# Patient Record
Sex: FEMALE | Race: BLACK OR AFRICAN AMERICAN | Employment: OTHER | URBAN - METROPOLITAN AREA
[De-identification: names, ages, dates, MRNs, and addresses within clinical notes are randomized per-mention and may not be internally consistent; named-entity substitution may affect disease eponyms.]

---

## 2017-06-22 ENCOUNTER — OFFICE VISIT (OUTPATIENT)
Dept: ENDOCRINOLOGY | Age: 72
End: 2017-06-22

## 2017-06-22 VITALS
HEART RATE: 78 BPM | OXYGEN SATURATION: 95 % | HEIGHT: 62 IN | RESPIRATION RATE: 18 BRPM | SYSTOLIC BLOOD PRESSURE: 112 MMHG | DIASTOLIC BLOOD PRESSURE: 91 MMHG | TEMPERATURE: 97.8 F | WEIGHT: 184 LBS | BODY MASS INDEX: 33.86 KG/M2

## 2017-06-22 DIAGNOSIS — I10 ESSENTIAL HYPERTENSION: ICD-10-CM

## 2017-06-22 DIAGNOSIS — E05.90 HYPERTHYROIDISM: Primary | ICD-10-CM

## 2017-06-22 RX ORDER — INDOMETHACIN 50 MG/1
CAPSULE ORAL
COMMUNITY
Start: 2017-04-11 | End: 2019-08-14

## 2017-06-22 RX ORDER — COLCHICINE 0.6 MG/1
TABLET, FILM COATED ORAL
COMMUNITY
Start: 2017-04-11 | End: 2021-01-10

## 2017-06-22 RX ORDER — METHIMAZOLE 5 MG/1
TABLET ORAL
Qty: 60 TAB | Refills: 5 | Status: SHIPPED | OUTPATIENT
Start: 2017-06-22 | End: 2018-02-07 | Stop reason: SDUPTHER

## 2017-06-22 RX ORDER — DUREZOL 0.5 MG/ML
EMULSION OPHTHALMIC
COMMUNITY
Start: 2017-05-31 | End: 2019-08-14

## 2017-06-22 RX ORDER — ALLOPURINOL 100 MG/1
200 TABLET ORAL DAILY
COMMUNITY
Start: 2017-04-13 | End: 2020-08-22

## 2017-06-22 RX ORDER — KETOROLAC TROMETHAMINE 5 MG/ML
SOLUTION OPHTHALMIC
COMMUNITY
Start: 2017-05-25 | End: 2019-08-14

## 2017-06-22 RX ORDER — FLUOXETINE HYDROCHLORIDE 20 MG/1
20 CAPSULE ORAL DAILY
COMMUNITY
Start: 2017-04-15 | End: 2020-08-22

## 2017-06-22 NOTE — PROGRESS NOTES
Visit Vitals    BP (!) 112/91    Pulse 78    Temp 97.8 °F (36.6 °C) (Oral)    Resp 18    Ht 5' 2\" (1.575 m)    Wt 184 lb (83.5 kg)    SpO2 95%    BMI 33.65 kg/m2     Chief Complaint   Patient presents with    Thyroid Problem

## 2017-06-22 NOTE — MR AVS SNAPSHOT
Visit Information Date & Time Provider Department Dept. Phone Encounter #  
 6/22/2017 11:00 AM Lui Carroll MD [facility] Endocr[facility]ology [phone] [id number] Follow-up Instructions Return in about 3 months (around 9/22/2017). Upcoming Health Maintenance Date Due Hepatitis C Screening 1945 DTaP/Tdap/Td series (1 - Tdap) 4/29/1966 BREAST CANCER SCRN MAMMOGRAM 4/29/1995 FOBT Q 1 YEAR AGE 50-75 4/29/1995 ZOSTER VACCINE AGE 60> 4/29/2005 GLAUCOMA SCREENING Q2Y 4/29/2010 OSTEOPOROSIS SCREENING (DEXA) 4/29/2010 Pneumococcal 65+ Low/Medium Risk (1 of 2 - PCV13) 4/29/2010 MEDICARE YEARLY EXAM 4/29/2010 INFLUENZA AGE 9 TO ADULT 8/1/2017 Allergies as of 6/22/2017  Review Complete On: 12/9/2014 By: Joyce Landers LPN Severity Noted Reaction Type Reactions Iodinated Contrast- Oral And Iv Dye High 06/22/2017    Anaphylaxis Codeine  06/22/2017    Itching Current Immunizations  Never Reviewed No immunizations on file. Not reviewed this visit You Were Diagnosed With   
  
 Codes Comments Hyperthyroidism    -  Primary ICD-10-CM: E05.90 ICD-9-CM: 242.90 Vitals BP Pulse Temp Resp Height(growth percentile) Weight(growth percentile) (!) 112/91 78 97.8 °F (36.6 °C) (Oral) 18 5' 2\" (1.575 m) 184 lb (83.5 kg) SpO2 BMI Smoking Status 95% 33.65 kg/m2 Current Every Day Smoker Vitals History BMI and BSA Data Body Mass Index Body Surface Area  
 33.65 kg/m 2 1.91 m 2 Your Updated Medication List  
  
   
This list is accurate as of: 6/22/17 12:20 PM.  Always use your most recent med list.  
  
  
  
  
 allopurinol 100 mg tablet Commonly known as:  Rai Casey ALPRAZolam 0.5 mg tablet Commonly known as:  Edwin Isela Take  by mouth. atorvastatin 40 mg tablet Commonly known as:  LIPITOR Take  by mouth daily. COLCRYS 0.6 mg tablet Generic drug:  colchicine DIOVAN 40 mg tablet Generic drug:  valsartan Take  by mouth daily. DUREZOL 0.05 % ophthalmic emulsion Generic drug:  Difluprednate FLUoxetine 20 mg capsule Commonly known as:  PROzac  
  
 indomethacin 50 mg capsule Commonly known as:  INDOCIN  
  
 ketorolac 0.5 % ophthalmic solution Commonly known as:  ACULAR  
  
 letrozole 2.5 mg tablet Commonly known as:  [facility] Take 2.5 mg by mouth daily. NexIUM 40 mg capsule Generic drug:  esomeprazole Take  by mouth daily. PARoxetine 40 mg tablet Commonly known as:  PAXIL Take 40 mg by mouth daily. spironolactone 25 mg tablet Commonly known as:  ALDACTONE Take  by mouth daily. traMADol 50 mg tablet Commonly known as:  ULTRAM  
Take 50 mg by mouth every six (6) hours as needed for Pain. VITAMIN D3 1,000 unit Cap Generic drug:  cholecalciferol Take  by mouth. Follow-up Instructions Return in about 3 months (around 9/22/2017). Introducing [facility] & HEALTH SERVICES! [employer] introduces [employer] patient portal. Now you can access parts of your medical record, email your doctor's office, and request medication refills online. 1. In your internet browser, go to [email or web address] 2. Click on the First Time User? Click Here link in the Sign In box. You will see the New Member Sign Up page. 3. Enter your [employer] Access Code exactly as it appears below. You will not need to use this code after youve completed the sign-up process. If you do not sign up before the expiration date, you must request a new code. · [employer] Access Code: [email or web address] Expires: 9/20/2017 12:19 PM 
 
4. Enter the last four digits of your Social Security Number (xxxx) and Date of Birth (mm/dd/yyyy) as indicated and click Submit. You will be taken to the next sign-up page. 5. Create a [employer] ID.  This will be your [employer] login ID and cannot be changed, so think of one that is secure and easy to remember. 6. Create a [employer] password. You can change your password at any time. 7. Enter your Password Reset Question and Answer. This can be used at a later time if you forget your password. 8. Enter your e-mail address. You will receive e-mail notification when new information is available in [address] 9. Click Sign Up. You can now view and download portions of your medical record. 10. Click the Download Summary menu link to download a portable copy of your medical information. If you have questions, please visit the Frequently Asked Questions section of the [employer] website. Remember, [employer] is NOT to be used for urgent needs. For medical emergencies, dial 911. Now available from your iPhone and Android! Please provide this summary of care documentation to your next provider. Your primary care clinician is listed as Elaine Chen. If you have any questions after today's visit, please call [phone].

## 2017-06-23 PROBLEM — I10 ESSENTIAL HYPERTENSION: Status: ACTIVE | Noted: 2017-06-23

## 2017-06-23 NOTE — PROGRESS NOTES
[facility]               Patel Duran MD              [address] [address]           Patient Information  Date:6/23/2017  Name : Shakila Aguirre 67 y.o.     YOB: 1945         Referred by: Dr Julieth Mckinnon   Patient presents with    Thyroid Problem       History of present illness    Shakila Aguirre is a 67 y.o. female  here for evaluation of hyperthyroidism. she was found to have abnormal thyroid function tests. TSH was suppressed with normal free T4 in September 2016 and repeat labs in April 2017 showed the same changes of suppressed TSH. She is with her sister who has hyperthyroidism. She complains of nervousness, shakiness, palpitations, and heat intolerance. She has not been losing weight, no diarrhea. No known history of osteoporosis or atrial fibrillation. She has a history of breast cancer. No change in the size of the neck or neck pain. No dysphagia,dysphonia or dyspnea. No history of known radiation exposure    No FH of thyroid disease. No FH of thyroid cancer     Past Medical History:   Diagnosis Date    Breast cancer ([facility] [address]     Gout     HTN (hypertension)     Hyperlipidemia     Hyperthyroidism        Current Outpatient Prescriptions   Medication Sig    allopurinol (ZYLOPRIM) 100 mg tablet     DUREZOL 0.05 % ophthalmic emulsion     COLCRYS 0.6 mg tablet     FLUoxetine (PROZAC) 20 mg capsule     indomethacin (INDOCIN) 50 mg capsule     ketorolac (ACULAR) 0.5 % ophthalmic solution     methIMAzole (TAPAZOLE) 5 mg tablet Take 2 tabs in the AM for one month, then decrease to 1 tab daily    spironolactone (ALDACTONE) 25 mg tablet Take  by mouth daily.  atorvastatin (LIPITOR) 40 mg tablet Take  by mouth daily.  PARoxetine (PAXIL) 40 mg tablet Take 40 mg by mouth daily.  esomeprazole (NEXIUM) 40 mg capsule Take  by mouth daily.     valsartan (DIOVAN) 40 mg tablet Take  by mouth daily.  letrozole (FEMARA) 2.5 mg tablet Take 2.5 mg by mouth daily.  Cholecalciferol, Vitamin D3, (VITAMIN D3) 1,000 unit cap Take  by mouth.  traMADol (ULTRAM) 50 mg tablet Take 50 mg by mouth every six (6) hours as needed for Pain.  ALPRAZolam (XANAX) 0.5 mg tablet Take  by mouth. No current facility-administered medications for this visit. Review of Systems:  - Constitutional Symptoms: no fevers, chills, no weight loss  - Eyes: no blurry vision or double vision  - Cardiovascular: no chest pain + palpitations  - Respiratory: no cough or shortness of breath  - Gastrointestinal: no dysphagia or abdominal pain  - Musculoskeletal: no joint pains or weakness  - Integumentary: no rashes  - Neurological: no numbness, tingling, or headaches  - Psychiatric: no depression or anxiety  - Endocrine: no heat or cold intolerance, no polyuria or polydipsia    Physical Examination:  Blood pressure (!) 112/91, pulse 78, temperature 97.8 °F (36.6 °C), temperature source Oral, resp. rate 18, height 5' 2\" (1.575 m), weight 184 lb (83.5 kg), SpO2 95 %. Body mass index is 33.65 kg/(m^2). - General: pleasant, no distress, good eye contact  - HEENT: no exopthalmos, no periorbital edema, no scleral/conjunctival injection, EOMI, no lid lag or stare  - Neck: supple, no thyromegaly, nodules, lymph nodes,   - Cardiovascular: regular,  normal S1 and S2, no murmurs  - Respiratory: clear to auscultation bilaterally  - Gastrointestinal: soft, nontender, nondistended, BS +  - Musculoskeletal: no proximal muscle weakness in upper or lower extremities  - Integumentary: + tremors, no edema  - Neurological: alert and oriented   - Psychiatric: normal mood and affect  - Skin - normal turgor    Data Reviewed:           [] Reviewed labs    Assessment/Plan:     1. Hyperthyroidism    2.  Essential hypertension        she has a TSH that is below the lower limit of normal with a normal free T4 , and clinically has some symptoms of hyperthyroidism. In patients older than 72 years  there is increased risk of atrial fibrillation,osteoporosis and hence it is beneficial to treat subclinical hyperthyroidism. Start Methimazole    - repeat TSH, free T4, and total T3 in 2 months. HTN - controlled     Follow-up Disposition:  Return in about 3 months (around 9/22/2017). Thank you for allowing me to participate in the care of this patient.     Mini Pizarro MD      Patient verbalized understanding

## 2017-09-19 LAB
BASOPHILS # BLD AUTO: 0 X10E3/UL (ref 0–0.2)
BASOPHILS NFR BLD AUTO: 0 %
EOSINOPHIL # BLD AUTO: 0.2 X10E3/UL (ref 0–0.4)
EOSINOPHIL NFR BLD AUTO: 2 %
ERYTHROCYTE [DISTWIDTH] IN BLOOD BY AUTOMATED COUNT: 14.8 % (ref 12.3–15.4)
HCT VFR BLD AUTO: 43.6 % (ref 34–46.6)
HGB BLD-MCNC: 14 G/DL (ref 11.1–15.9)
IMM GRANULOCYTES # BLD: 0 X10E3/UL (ref 0–0.1)
IMM GRANULOCYTES NFR BLD: 0 %
LYMPHOCYTES # BLD AUTO: 2.6 X10E3/UL (ref 0.7–3.1)
LYMPHOCYTES NFR BLD AUTO: 31 %
MCH RBC QN AUTO: 29.5 PG (ref 26.6–33)
MCHC RBC AUTO-ENTMCNC: 32.1 G/DL (ref 31.5–35.7)
MCV RBC AUTO: 92 FL (ref 79–97)
MONOCYTES # BLD AUTO: 0.5 X10E3/UL (ref 0.1–0.9)
MONOCYTES NFR BLD AUTO: 6 %
NEUTROPHILS # BLD AUTO: 5.1 X10E3/UL (ref 1.4–7)
NEUTROPHILS NFR BLD AUTO: 61 %
PLATELET # BLD AUTO: 305 X10E3/UL (ref 150–379)
PLEASE NOTE:, 188601: NORMAL
RBC # BLD AUTO: 4.75 X10E6/UL (ref 3.77–5.28)
T4 FREE SERPL-MCNC: 0.51 NG/DL (ref 0.82–1.77)
TSH SERPL DL<=0.005 MIU/L-ACNC: 1.8 UIU/ML (ref 0.45–4.5)
WBC # BLD AUTO: 8.4 X10E3/UL (ref 3.4–10.8)

## 2017-10-03 ENCOUNTER — OFFICE VISIT (OUTPATIENT)
Dept: ENDOCRINOLOGY | Age: 72
End: 2017-10-03

## 2017-10-03 VITALS
TEMPERATURE: 97.3 F | WEIGHT: 185.3 LBS | OXYGEN SATURATION: 96 % | DIASTOLIC BLOOD PRESSURE: 89 MMHG | SYSTOLIC BLOOD PRESSURE: 151 MMHG | HEIGHT: 62 IN | HEART RATE: 75 BPM | RESPIRATION RATE: 16 BRPM | BODY MASS INDEX: 34.1 KG/M2

## 2017-10-03 DIAGNOSIS — E05.90 HYPERTHYROIDISM: Primary | ICD-10-CM

## 2017-10-03 DIAGNOSIS — I10 ESSENTIAL HYPERTENSION: ICD-10-CM

## 2017-10-03 RX ORDER — PREDNISOLONE ACETATE 10 MG/ML
SUSPENSION/ DROPS OPHTHALMIC
COMMUNITY
Start: 2017-09-28 | End: 2019-08-14

## 2017-10-03 RX ORDER — ATROPINE SULFATE 10 MG/ML
SOLUTION/ DROPS OPHTHALMIC
COMMUNITY
Start: 2017-09-28 | End: 2019-08-14

## 2017-10-03 NOTE — PROGRESS NOTES
Lulu Hendricks is a 67 y.o. female here for   Chief Complaint   Patient presents with    Thyroid Problem       1. Have you been to the ER, urgent care clinic since your last visit? Hospitalized since your last visit? -no    2. Have you seen or consulted any other health care providers outside of the [address] [place] [address] since your last visit? Include any pap smears or colon screening. -PCP    Wt Readings from Last 3 Encounters:   06/22/17 184 lb (83.5 kg)   12/09/14 166 lb (75.3 kg)     Temp Readings from Last 3 Encounters:   06/22/17 97.8 °F (36.6 °C) (Oral)   12/09/14 97.5 °F (36.4 °C) (Oral)     BP Readings from Last 3 Encounters:   06/22/17 (!) 112/91   12/09/14 121/70     Pulse Readings from Last 3 Encounters:   06/22/17 78   12/09/14 76

## 2017-10-03 NOTE — PROGRESS NOTES
[facility]               Kale Diaz MD              [address] [address]           Patient Information  Date:10/3/2017  Name : Anel Cooley 67 y.o.     YOB: 1945         Referred by: Dr Duane Jasper        Chief Complaint   Patient presents with    Thyroid Problem       History of present illness    Anel Cooley is a 67 y.o. female  here for fu of hyperthyroidism. she was found to have abnormal thyroid function tests. TSH was suppressed with normal free T4 in September 2016 and repeat labs in April 2017 showed the same changes of suppressed TSH. She has a history of breast cancer. No change in the size of the neck or neck pain. No dysphagia,dysphonia or dyspnea. On methimazole which was started in 6/17  No history of known radiation exposure    No FH of thyroid disease. No FH of thyroid cancer     Wt Readings from Last 3 Encounters:   10/03/17 185 lb 4.8 oz (84.1 kg)   06/22/17 184 lb (83.5 kg)   12/09/14 166 lb (75.3 kg)       Past Medical History:   Diagnosis Date    [facility] ([facility] [address]     Gout     HTN (hypertension)     Hyperlipidemia     Hyperthyroidism        Current Outpatient Prescriptions   Medication Sig    prednisoLONE acetate (PRED FORTE) 1 % ophthalmic suspension     atropine 1 % ophthalmic solution     allopurinol (ZYLOPRIM) 100 mg tablet     COLCRYS 0.6 mg tablet     FLUoxetine (PROZAC) 20 mg capsule     indomethacin (INDOCIN) 50 mg capsule     methIMAzole (TAPAZOLE) 5 mg tablet Take 2 tabs in the AM for one month, then decrease to 1 tab daily    spironolactone (ALDACTONE) 25 mg tablet Take  by mouth daily.  atorvastatin (LIPITOR) 40 mg tablet Take  by mouth daily.  PARoxetine (PAXIL) 40 mg tablet Take 40 mg by mouth daily.  esomeprazole (NEXIUM) 40 mg capsule Take  by mouth daily.  valsartan (DIOVAN) 40 mg tablet Take  by mouth daily.     letrozole (FEMARA) 2.5 mg tablet Take 2.5 mg by mouth daily.  traMADol (ULTRAM) 50 mg tablet Take 50 mg by mouth every six (6) hours as needed for Pain.  ALPRAZolam (XANAX) 0.5 mg tablet Take  by mouth.  DUREZOL 0.05 % ophthalmic emulsion     ketorolac (ACULAR) 0.5 % ophthalmic solution     Cholecalciferol, Vitamin D3, (VITAMIN D3) 1,000 unit cap Take  by mouth. No current facility-administered medications for this visit. Review of Systems:  -   - Respiratory: no cough or shortness of breath  - Gastrointestinal: no dysphagia or abdominal pain  - Musculoskeletal: no joint pains or weakness  - Integumentary: no rashes  - Neurological: no numbness, tingling, or headaches  -     Physical Examination:  Blood pressure 151/89, pulse 75, temperature 97.3 °F (36.3 °C), temperature source Oral, resp. rate 16, height 5' 2\" (1.575 m), weight 185 lb 4.8 oz (84.1 kg), SpO2 96 %. Body mass index is 33.89 kg/(m^2). - General: pleasant, no distress, good eye contact  - HEENT: no exopthalmos, no periorbital edema, no scleral/conjunctival injection, EOMI, no lid lag or stare  - Neck: supple, no thyromegaly, nodules, lymph nodes,   - Cardiovascular: regular,  normal S1 and S2, no murmurs  - Respiratory: clear to auscultation bilaterally  - Gastrointestinal: soft, nontender, nondistended, BS +  - Musculoskeletal: no proximal muscle weakness in upper or lower extremities  - Integumentary: + tremors, no edema  - Neurological: alert and oriented   - Psychiatric: normal mood and affect  - Skin - normal turgor    Data Reviewed:           [] Reviewed labs    Assessment/Plan:     1. Hyperthyroidism    2. Essential hypertension        she has a TSH that is below the lower limit of normal with a normal free T4 , and clinically has some symptoms of hyperthyroidism. In patients older than 72 years  there is increased risk of atrial fibrillation,osteoporosis and hence it is beneficial to treat subclinical hyperthyroidism.    TFTS has improved Methimazole 5 mg M /W/F    - repeat TSH, free T4, and total T3 in 2 months. HTN - optimal for her age     Follow-up Disposition:  Return in about 4 months (around 2/3/2018). Thank you for allowing me to participate in the care of this patient.     Leatha Blair MD      Patient verbalized understanding

## 2017-10-03 NOTE — MR AVS SNAPSHOT
Visit Information Date & Time Provider Department Dept. Phone Encounter #  
 10/3/2017 10:30 AM Petr Mehta MD [facility] [phone] [id number] Follow-up Instructions Return in about 4 months (around 2/3/2018). Upcoming Health Maintenance Date Due Hepatitis C Screening 1945 DTaP/Tdap/Td series (1 - Tdap) 4/29/1966 BREAST CANCER SCRN MAMMOGRAM 4/29/1995 FOBT Q 1 YEAR AGE 50-75 4/29/1995 ZOSTER VACCINE AGE 60> 2/28/2005 GLAUCOMA SCREENING Q2Y 4/29/2010 OSTEOPOROSIS SCREENING (DEXA) 4/29/2010 Pneumococcal 65+ Low/Medium Risk (1 of 2 - PCV13) 4/29/2010 MEDICARE YEARLY EXAM 4/29/2010 INFLUENZA AGE 9 TO ADULT 8/1/2017 Allergies as of 10/3/2017  Review Complete On: 10/3/2017 By: Petr Mehta MD  
  
 Severity Noted Reaction Type Reactions Iodinated Contrast- Oral And Iv Dye High 06/22/2017    Anaphylaxis Codeine  06/22/2017    Itching Current Immunizations  Never Reviewed No immunizations on file. Not reviewed this visit You Were Diagnosed With   
  
 Codes Comments Hyperthyroidism    -  Primary ICD-10-CM: E05.90 ICD-9-CM: 242.90 Vitals BP Pulse Temp Resp Height(growth percentile) Weight(growth percentile) 151/89 (BP 1 Location: Right arm, BP Patient Position: Sitting) 75 97.3 °F (36.3 °C) (Oral) 16 5' 2\" (1.575 m) 185 lb 4.8 oz (84.1 kg) SpO2 BMI Smoking Status 96% 33.89 kg/m2 Current Every Day Smoker Vitals History BMI and BSA Data Body Mass Index Body Surface Area  
 33.89 kg/m 2 1.92 m 2 Preferred Pharmacy Pharmacy Name Phone [facility] [address] [facility], [address] Your Updated Medication List  
  
   
This list is accurate as of: 10/3/17 11:05 AM.  Always use your most recent med list.  
  
  
  
  
 allopurinol 100 mg tablet Commonly known as:  Omaira Cardenas ALPRAZolam 0.5 mg tablet Commonly known as:  Volanda Pronto Take  by mouth. atorvastatin 40 mg tablet Commonly known as:  LIPITOR Take  by mouth daily. atropine 1 % ophthalmic solution COLCRYS 0.6 mg tablet Generic drug:  colchicine DIOVAN 40 mg tablet Generic drug:  valsartan Take  by mouth daily. DUREZOL 0.05 % ophthalmic emulsion Generic drug:  Difluprednate FLUoxetine 20 mg capsule Commonly known as:  PROzac  
  
 indomethacin 50 mg capsule Commonly known as:  INDOCIN  
  
 ketorolac 0.5 % ophthalmic solution Commonly known as:  ACULAR  
  
 letrozole 2.5 mg tablet Commonly known as:  [facility] Take 2.5 mg by mouth daily. methIMAzole 5 mg tablet Commonly known as:  TAPAZOLE Take 2 tabs in the AM for one month, then decrease to 1 tab daily NexIUM 40 mg capsule Generic drug:  esomeprazole Take  by mouth daily. PARoxetine 40 mg tablet Commonly known as:  PAXIL Take 40 mg by mouth daily. prednisoLONE acetate 1 % ophthalmic suspension Commonly known as:  PRED FORTE  
  
 spironolactone 25 mg tablet Commonly known as:  ALDACTONE Take  by mouth daily. traMADol 50 mg tablet Commonly known as:  ULTRAM  
Take 50 mg by mouth every six (6) hours as needed for Pain. VITAMIN D3 1,000 unit Cap Generic drug:  cholecalciferol Take  by mouth. Follow-up Instructions Return in about 4 months (around 2/3/2018). Introducing [facility] & HEALTH SERVICES! [facility] introduces [employer] patient portal. Now you can access parts of your medical record, email your doctor's office, and request medication refills online. 1. In your internet browser, go to [email or web address] 2. Click on the First Time User? Click Here link in the Sign In box. You will see the New Member Sign Up page. 3. Enter your [employer] Access Code exactly as it appears below.  You will not need to use this code after youve completed the sign-up process. If you do not sign up before the expiration date, you must request a new code. · Status Overload Access Code: [email or web address] Expires: 1/1/2018 11:05 AM 
 
4. Enter the last four digits of your Social Security Number (xxxx) and Date of Birth (mm/dd/yyyy) as indicated and click Submit. You will be taken to the next sign-up page. 5. Create a Status Overload ID. This will be your Status Overload login ID and cannot be changed, so think of one that is secure and easy to remember. 6. Create a Status Over[employer] password. You can change your password at any time. 7. Enter your Password Reset Question and Answer. This can be used at a later time if you forget your password. 8. Enter your e-mail address. You will receive e-mail notification when new information is available in [address] 9. Click Sign Up. You can now view and download portions of your medical record. 10. Click the Download Summary menu link to download a portable copy of your medical information. If you have questions, please visit the Frequently Asked Questions section of the Status Over[employer] website. Remember, Status Over[employer] is NOT to be used for urgent needs. For medical emergencies, dial 911. Now available from your iPhone and Android! Please provide this summary of care documentation to your next provider. Your primary care clinician is listed as Shalonda Coleman. If you have any questions after today's visit, please call [phone].

## 2018-01-18 LAB — COLONOSCOPY, EXTERNAL: NORMAL

## 2018-01-26 LAB
CREATININE, EXTERNAL: 1.52
HBA1C MFR BLD HPLC: 6.8 %
LDL-C, EXTERNAL: 97

## 2018-02-07 ENCOUNTER — TELEPHONE (OUTPATIENT)
Dept: ENDOCRINOLOGY | Age: 73
End: 2018-02-07

## 2018-02-07 ENCOUNTER — OFFICE VISIT (OUTPATIENT)
Dept: ENDOCRINOLOGY | Age: 73
End: 2018-02-07

## 2018-02-07 VITALS
OXYGEN SATURATION: 97 % | WEIGHT: 188.2 LBS | RESPIRATION RATE: 18 BRPM | HEART RATE: 68 BPM | SYSTOLIC BLOOD PRESSURE: 146 MMHG | DIASTOLIC BLOOD PRESSURE: 78 MMHG | TEMPERATURE: 97.1 F | HEIGHT: 62 IN | BODY MASS INDEX: 34.63 KG/M2

## 2018-02-07 DIAGNOSIS — I10 ESSENTIAL HYPERTENSION: ICD-10-CM

## 2018-02-07 DIAGNOSIS — E05.90 HYPERTHYROIDISM: Primary | ICD-10-CM

## 2018-02-07 DIAGNOSIS — E05.90 HYPERTHYROIDISM: ICD-10-CM

## 2018-02-07 DIAGNOSIS — G25.2 COARSE TREMORS: ICD-10-CM

## 2018-02-07 RX ORDER — METHIMAZOLE 5 MG/1
TABLET ORAL
Qty: 60 TAB | Refills: 5 | Status: SHIPPED | OUTPATIENT
Start: 2018-02-07 | End: 2018-08-08 | Stop reason: SDUPTHER

## 2018-02-07 NOTE — MR AVS SNAPSHOT
[address] [facility] [address] 
[phone] Patient: Olivia Messina MRN: [id number] :1945 Visit Information Date & Time Provider Department Dept. Phone Encounter #  
 2018 10:45 AM Kirstin Shay MD [facility] [place] Diabetes & Endocrinology [phone] [id number] Follow-up Instructions Return in about 6 months (around 2018). Upcoming Health Maintenance Date Due Hepatitis C Screening 1945 DTaP/Tdap/Td series (1 - Tdap) 1966 BREAST CANCER SCRN MAMMOGRAM 1995 FOBT Q 1 YEAR AGE 50-75 1995 ZOSTER VACCINE AGE 60> 2005 GLAUCOMA SCREENING Q2Y 2010 OSTEOPOROSIS SCREENING (DEXA) 2010 Pneumococcal 65+ Low/Medium Risk (1 of 2 - PCV13) 2010 MEDICARE YEARLY EXAM 2010 Influenza Age 5 to Adult 2017 Allergies as of 2018  Review Complete On: 2018 By: Kirstin Shay MD  
  
 Severity Noted Reaction Type Reactions Iodinated Contrast- Oral And Iv Dye High 2017    Anaphylaxis Codeine  2017    Itching Current Immunizations  Never Reviewed No immunizations on file. Not reviewed this visit You Were Diagnosed With   
  
 Codes Comments Hyperthyroidism    -  Primary ICD-10-CM: E05.90 ICD-9-CM: 242.90 Vitals BP Pulse Temp Resp Height(growth percentile) Weight(growth percentile) 146/78 (BP 1 Location: Left arm, BP Patient Position: Sitting) 68 97.1 °F (36.2 °C) (Oral) 18 5' 2\" (1.575 m) 188 lb 3.2 oz (85.4 kg) SpO2 BMI Smoking Status 97% 34.42 kg/m2 Current Every Day Smoker Vitals History BMI and BSA Data Body Mass Index Body Surface Area 34.42 kg/m 2 1.93 m 2 Preferred Pharmacy Pharmacy Name Phone [facility] [address] [facility], [address] [phone] Your Updated Medication List  
  
   
 This list is accurate as of: 2/7/18 11:28 AM.  Always use your most recent med list.  
  
  
  
  
 allopurinol 100 mg tablet Commonly known as:  Chama Jie ALPRAZolam 0.5 mg tablet Commonly known as:  Ros Windsor Take  by mouth. atorvastatin 40 mg tablet Commonly known as:  LIPITOR Take  by mouth daily. atropine 1 % ophthalmic solution COLCRYS 0.6 mg tablet Generic drug:  colchicine DIOVAN 40 mg tablet Generic drug:  valsartan Take  by mouth daily. DUREZOL 0.05 % ophthalmic emulsion Generic drug:  Difluprednate FLUoxetine 20 mg capsule Commonly known as:  PROzac  
  
 indomethacin 50 mg capsule Commonly known as:  INDOCIN  
  
 ketorolac 0.5 % ophthalmic solution Commonly known as:  ACULAR  
  
 letrozole 2.5 mg tablet Commonly known as:  [facility] Take 2.5 mg by mouth daily. methIMAzole 5 mg tablet Commonly known as:  TAPAZOLE Take 2 tabs in the AM for one month, then decrease to 1 tab daily NexIUM 40 mg capsule Generic drug:  esomeprazole Take  by mouth daily. PARoxetine 40 mg tablet Commonly known as:  PAXIL Take 40 mg by mouth daily. prednisoLONE acetate 1 % ophthalmic suspension Commonly known as:  PRED FORTE  
  
 spironolactone 25 mg tablet Commonly known as:  ALDACTONE Take  by mouth daily. traMADol 50 mg tablet Commonly known as:  ULTRAM  
Take 50 mg by mouth every six (6) hours as needed for Pain. VITAMIN D3 1,000 unit Cap Generic drug:  cholecalciferol Take  by mouth. Follow-up Instructions Return in about 6 months (around 8/7/2018). Introducing [facility] SERVICES! [employer] introduces [employer] patient portal. Now you can access parts of your medical record, email your doctor's office, and request medication refills online. 1. In your internet browser, go to [email or web address] 2. Click on the First Time User? Click Here link in the Sign In box. You will see the New Member Sign Up page. 3. Enter your [employer] Access Code exactly as it appears below. You will not need to use this code after youve completed the sign-up process. If you do not sign up before the expiration date, you must request a new code. · [employer] Access Code: [email or web address] Expires: 5/8/2018 11:27 AM 
 
4. Enter the last four digits of your Social Security Number (xxxx) and Date of Birth (mm/dd/yyyy) as indicated and click Submit. You will be taken to the next sign-up page. 5. Create a [employer] ID. This will be your [employer] login ID and cannot be changed, so think of one that is secure and easy to remember. 6. Create a [employer] password. You can change your password at any time. 7. Enter your Password Reset Question and Answer. This can be used at a later time if you forget your password. 8. Enter your e-mail address. You will receive e-mail notification when new information is available in [address] 9. Click Sign Up. You can now view and download portions of your medical record. 10. Click the Download Summary menu link to download a portable copy of your medical information. If you have questions, please visit the Frequently Asked Questions section of the [employer] website. Remember, [employer] is NOT to be used for urgent needs. For medical emergencies, dial 911. Now available from your iPhone and Android! Please provide this summary of care documentation to your next provider. Your primary care clinician is listed as [facility]. If you have any questions after today's visit, please call [phone].

## 2018-02-07 NOTE — PROGRESS NOTES
[facility]               Julio Manuel MD              [address] [id number]           Patient Information  Date:2/8/2018  Name : Mary Wiley 67 y.o.     YOB: 1945         Referred by: Dr Van Valentin        Chief Complaint   Patient presents with    Thyroid Problem       History of present illness    Mary Wiley is a 67 y.o. female  here for fu of hyperthyroidism. she was found to have abnormal thyroid function tests. TSH was suppressed with normal free T4 in September 2016 and repeat labs in April 2017 showed the same changes of suppressed TSH. She has a history of breast cancer. No change in the size of the neck or neck pain. No dysphagia,dysphonia or dyspnea. On methimazole which was started in 6/17    She is taking the methimazole consistently, 5 mg  3 times a week  No fever, sore throat    No FH of thyroid disease. No FH of thyroid cancer     Wt Readings from Last 3 Encounters:   02/07/18 188 lb 3.2 oz (85.4 kg)   10/03/17 185 lb 4.8 oz (84.1 kg)   06/22/17 184 lb (83.5 kg)       Past Medical History:   Diagnosis Date    Breast cancer (HCC)     Gout     HTN (hypertension)     Hyperlipidemia     Hyperthyroidism        Current Outpatient Prescriptions   Medication Sig    prednisoLONE acetate (PRED FORTE) 1 % ophthalmic suspension     atropine 1 % ophthalmic solution     allopurinol (ZYLOPRIM) 100 mg tablet     DUREZOL 0.05 % ophthalmic emulsion     COLCRYS 0.6 mg tablet     FLUoxetine (PROZAC) 20 mg capsule     indomethacin (INDOCIN) 50 mg capsule     ketorolac (ACULAR) 0.5 % ophthalmic solution     atorvastatin (LIPITOR) 40 mg tablet Take  by mouth daily.  esomeprazole (NEXIUM) 40 mg capsule Take  by mouth daily.  valsartan (DIOVAN) 40 mg tablet Take  by mouth daily.  letrozole (FEMARA) 2.5 mg tablet Take 2.5 mg by mouth daily.     Cholecalciferol, Vitamin D3, (VITAMIN D3) 1,000 unit cap Take  by mouth.  ALPRAZolam (XANAX) 0.5 mg tablet Take  by mouth.  methIMAzole (TAPAZOLE) 5 mg tablet Taking one tablet Monday, Wednesday, and Friday.  spironolactone (ALDACTONE) 25 mg tablet Take  by mouth daily.  PARoxetine (PAXIL) 40 mg tablet Take 40 mg by mouth daily.  traMADol (ULTRAM) 50 mg tablet Take 50 mg by mouth every six (6) hours as needed for Pain. No current facility-administered medications for this visit. Review of Systems:  -   - Respiratory: no cough or shortness of breath  - Gastrointestinal: no dysphagia or abdominal pain  - Musculoskeletal: no joint pains or weakness  - Integumentary: no rashes  - Neurological: no numbness, tingling, or headaches  -     Physical Examination:  Blood pressure 146/78, pulse 68, temperature 97.1 °F (36.2 °C), temperature source Oral, resp. rate 18, height 5' 2\" (1.575 m), weight 188 lb 3.2 oz (85.4 kg), SpO2 97 %. Body mass index is 34.42 kg/(m^2). - General: pleasant, no distress, good eye contact  - HEENT: no exopthalmos, no periorbital edema, no scleral/conjunctival injection, EOMI, no lid lag or stare  - Neck: supple, no thyromegaly, nodules, lymph nodes,   - Cardiovascular: regular,  normal S1 and S2,  - Respiratory: clear to auscultation bilaterally  - Gastrointestinal: soft, nontender, nondistended, BS +  - Musculoskeletal: no proximal muscle weakness in upper or lower extremities  - Integumentary: + tremors, no edema  - Neurological: alert and oriented   - Psychiatric: normal mood and affect  - Skin - normal turgor    Data Reviewed:   Labs from PCPs office which was done in January 2018 reviewed        [] Reviewed labs    Assessment/Plan:     1. Hyperthyroidism    2. Essential hypertension    3. Coarse tremors        she has a TSH that is below the lower limit of normal with a normal free T4 , and clinically has some symptoms of hyperthyroidism.   In patients older than 65 years  there is increased risk of atrial fibrillation,osteoporosis and hence it is beneficial to treat subclinical hyperthyroidism. Methimazole 5 mg M /W/F reviewed the labs from PCPs office. TFTs are normal        HTN - continue present management    Chronic tremors: She is biochemically euthyroid, so not related to her thyroid condition, age-related.    :    Follow-up Disposition:  Return in about 6 months (around 8/7/2018). Thank you for allowing me to participate in the care of this patient.     Michelle Bundy MD      Patient verbalized understanding

## 2018-02-08 PROBLEM — G25.2 COARSE TREMORS: Status: ACTIVE | Noted: 2018-02-08

## 2018-02-08 PROBLEM — E05.00 GRAVES DISEASE: Status: ACTIVE | Noted: 2018-02-08

## 2018-02-08 PROBLEM — E05.00 GRAVES DISEASE: Status: RESOLVED | Noted: 2018-02-08 | Resolved: 2018-02-08

## 2018-05-21 ENCOUNTER — TELEPHONE (OUTPATIENT)
Dept: ENDOCRINOLOGY | Age: 73
End: 2018-05-21

## 2018-08-08 ENCOUNTER — OFFICE VISIT (OUTPATIENT)
Dept: ENDOCRINOLOGY | Age: 73
End: 2018-08-08

## 2018-08-08 VITALS
WEIGHT: 186.3 LBS | RESPIRATION RATE: 16 BRPM | BODY MASS INDEX: 34.28 KG/M2 | HEIGHT: 62 IN | TEMPERATURE: 97.3 F | DIASTOLIC BLOOD PRESSURE: 84 MMHG | HEART RATE: 68 BPM | SYSTOLIC BLOOD PRESSURE: 139 MMHG | OXYGEN SATURATION: 95 %

## 2018-08-08 DIAGNOSIS — E05.90 HYPERTHYROIDISM: ICD-10-CM

## 2018-08-08 DIAGNOSIS — G25.2 COARSE TREMORS: ICD-10-CM

## 2018-08-08 DIAGNOSIS — E05.90 HYPERTHYROIDISM: Primary | ICD-10-CM

## 2018-08-08 DIAGNOSIS — I10 ESSENTIAL HYPERTENSION: ICD-10-CM

## 2018-08-08 RX ORDER — RANITIDINE 300 MG/1
300 TABLET ORAL DAILY
COMMUNITY
End: 2019-08-14

## 2018-08-08 RX ORDER — LOSARTAN POTASSIUM 100 MG/1
100 TABLET ORAL DAILY
COMMUNITY

## 2018-08-08 RX ORDER — ASPIRIN 81 MG/1
81 TABLET ORAL DAILY
COMMUNITY
End: 2022-02-11

## 2018-08-08 RX ORDER — FAMOTIDINE 20 MG/1
20 TABLET, FILM COATED ORAL AS NEEDED
COMMUNITY
End: 2019-08-14

## 2018-08-08 RX ORDER — METHIMAZOLE 5 MG/1
TABLET ORAL
Qty: 90 TAB | Refills: 5 | Status: SHIPPED | OUTPATIENT
Start: 2018-08-08 | End: 2019-10-08 | Stop reason: SDUPTHER

## 2018-08-08 RX ORDER — GLIMEPIRIDE 4 MG/1
4 TABLET ORAL
COMMUNITY
End: 2020-06-26

## 2018-08-08 RX ORDER — NAPROXEN 375 MG/1
375 TABLET ORAL AS NEEDED
COMMUNITY
End: 2019-08-14

## 2018-08-08 NOTE — PROGRESS NOTES
Seble Maurice is a 68 y.o. female here for   Chief Complaint   Patient presents with    Thyroid Problem       1. Have you been to the ER, urgent care clinic since your last visit? Hospitalized since your last visit? -ER in [place] [address] about a month ago because Kidney doc told her to go because potassium was high    2. Have you seen or consulted any other health care providers outside of the [address] [place] [address] since your last visit?   Include any pap smears or colon screening.-Dr. You Estrella and PCP

## 2018-08-08 NOTE — MR AVS SNAPSHOT
[address] [address] 
[phone] Patient: Glory Campbell MRN: [id number] :1945 Visit Information Date & Time Provider Department Dept. Phone Encounter #  
 2018 11:30 AM Arely Albrecht MD [facility] Endocrinology [phone] [id number] Follow-up Instructions Return in about 6 months (around 2019). Upcoming Health Maintenance Date Due Hepatitis C Screening 1945 DTaP/Tdap/Td series (1 - Tdap) 1966 BREAST CANCER SCRN MAMMOGRAM 1995 FOBT Q 1 YEAR AGE 50-75 1995 ZOSTER VACCINE AGE 60> 2005 GLAUCOMA SCREENING Q2Y 2010 Bone Densitometry (Dexa) Screening 2010 Pneumococcal 65+ Low/Medium Risk (1 of 2 - PCV13) 2010 MEDICARE YEARLY EXAM 3/14/2018 Influenza Age 5 to Adult 2018 Allergies as of 2018  Review Complete On: 2018 By: Arely Albrecht MD  
  
 Severity Noted Reaction Type Reactions Iodinated Contrast- Oral And Iv Dye High 2017    Anaphylaxis Codeine  2017    Itching Current Immunizations  Never Reviewed No immunizations on file. Not reviewed this visit You Were Diagnosed With   
  
 Codes Comments Hyperthyroidism    -  Primary ICD-10-CM: E05.90 ICD-9-CM: 242.90 Vitals BP Pulse Temp Resp Height(growth percentile) Weight(growth percentile) 139/84 (BP 1 Location: Left arm, BP Patient Position: Sitting) 68 97.3 °F (36.3 °C) (Oral) 16 5' 2\" (1.575 m) 186 lb 4.8 oz (84.5 kg) SpO2 BMI Smoking Status 95% 34.07 kg/m2 Current Every Day Smoker Vitals History BMI and BSA Data Body Mass Index Body Surface Area 34.07 kg/m 2 1.92 m 2 Preferred Pharmacy Pharmacy Name Phone [address] [facility] [address] [facility], [address] [phone] Your Updated Medication List  
  
   
 This list is accurate as of 8/8/18 11:35 AM.  Always use your most recent med list.  
  
  
  
  
 allopurinol 100 mg tablet Commonly known as:  Calvin Echevarria ALPRAZolam 0.5 mg tablet Commonly known as:  Too January Take  by mouth. aspirin delayed-release 81 mg tablet Take  by mouth daily. atorvastatin 40 mg tablet Commonly known as:  LIPITOR Take  by mouth daily. atropine 1 % ophthalmic solution COLCRYS 0.6 mg tablet Generic drug:  colchicine DIOVAN 40 mg tablet Generic drug:  valsartan Take  by mouth daily. DUREZOL 0.05 % ophthalmic emulsion Generic drug:  Difluprednate  
  
 famotidine 20 mg tablet Commonly known as:  PEPCID Take 20 mg by mouth as needed. FLUoxetine 20 mg capsule Commonly known as:  PROzac  
  
 glimepiride 4 mg tablet Commonly known as:  AMARYL Take 4 mg by mouth every morning. indomethacin 50 mg capsule Commonly known as:  INDOCIN  
  
 ketorolac 0.5 % ophthalmic solution Commonly known as:  ACULAR  
  
 letrozole 2.5 mg tablet Commonly known as:  [facility] Take 2.5 mg by mouth daily. losartan 50 mg tablet Commonly known as:  COZAAR Take  by mouth daily. methIMAzole 5 mg tablet Commonly known as:  TAPAZOLE Taking one tablet Monday, Wednesday, and Friday. NAPROSYN 375 mg tablet Generic drug:  naproxen Take 375 mg by mouth as needed. NexIUM 40 mg capsule Generic drug:  esomeprazole Take  by mouth daily. PARoxetine 40 mg tablet Commonly known as:  PAXIL Take 40 mg by mouth daily. prednisoLONE acetate 1 % ophthalmic suspension Commonly known as:  PRED FORTE  
  
 raNITIdine 300 mg tablet Commonly known as:  ZANTAC Take 300 mg by mouth daily. spironolactone 25 mg tablet Commonly known as:  ALDACTONE Take  by mouth daily. traMADol 50 mg tablet Commonly known as:  ULTRAM  
Take 50 mg by mouth every six (6) hours as needed for Pain. VITAMIN D3 1,000 unit Cap Generic drug:  cholecalciferol Take  by mouth. Follow-up Instructions Return in about 6 months (around 2/8/2019). To-Do List   
 01/08/2019 Lab:  T3 TOTAL   
  
 01/08/2019 Lab:  T4, FREE   
  
 01/08/2019 Lab:  TSH 3RD GENERATION Introducing [facility] & HEALTH SERVICES! Mansi Matamoros introduces [employer] patient portal. Now you can access parts of your medical record, email your doctor's office, and request medication refills online. 1. In your internet browser, go to [email or web address]"/[email or web address] 2. Click on the First Time User? Click Here link in the Sign In box. You will see the New Member Sign Up page. 3. Enter your [employer] Access Code exactly as it appears below. You will not need to use this code after youve completed the sign-up process. If you do not sign up before the expiration date, you must request a new code. · [employer] Access Code: [email or web address] Expires: 11/6/2018 11:35 AM 
 
4. Enter the last four digits of your Social Security Number (xxxx) and Date of Birth (mm/dd/yyyy) as indicated and click Submit. You will be taken to the next sign-up page. 5. Create a [employer] ID. This will be your [employer] login ID and cannot be changed, so think of one that is secure and easy to remember. 6. Create a [employer] password. You can change your password at any time. 7. Enter your Password Reset Question and Answer. This can be used at a later time if you forget your password. 8. Enter your e-mail address. You will receive e-mail notification when new information is available in [address] 9. Click Sign Up. You can now view and download portions of your medical record. 10. Click the Download Summary menu link to download a portable copy of your medical information. If you have questions, please visit the Frequently Asked Questions section of the [employer] website.  Remember, [employer] is NOT to be used for urgent needs. For medical emergencies, dial 911. Now available from your iPhone and Android! Please provide this summary of care documentation to your next provider. Your primary care clinician is listed as Zita Leon. If you have any questions after today's visit, please call [phone].

## 2018-08-08 NOTE — PROGRESS NOTES
[facility] ENDO[facility]INOLOG[facility]               Jh Barth MD              [address] [address]           Patient Information  Date:8/8/2018  Name : Alyssa Newman 68 y.o.     YOB: 1945         Referred by: Dr Caroline Pedersen        Chief Complaint   Patient presents with    Thyroid Problem       History of present illness    Alyssa Newman is a 68 y.o. female  here for fu of hyperthyroidism. she was found to have abnormal thyroid function tests. TSH was suppressed with normal free T4 in September 2016 and repeat labs in April 2017 showed the same changes of suppressed TSH. She has a history of breast cancer. No change in the size of the neck or neck pain. No dysphagia,dysphonia or dyspnea. On methimazole which was started in 6/17    The recent TSH from the hospital [facility] was low, TSH assay variation versus missing medications versus need for higher dose  Reports to be taking methimazole consistently  Here with her sister  No fever, sore throat    . No FH of thyroid cancer     Wt Readings from Last 3 Encounters:   08/08/18 186 lb 4.8 oz (84.5 kg)   02/07/18 188 lb 3.2 oz (85.4 kg)   10/03/17 185 lb 4.8 oz (84.1 kg)       Past Medical History:   Diagnosis Date    Breast cancer ([address]     Gout     HTN (hypertension)     Hyperlipidemia     Hyperthyroidism        Current Outpatient Prescriptions   Medication Sig    famotidine (PEPCID) 20 mg tablet Take 20 mg by mouth as needed.  glimepiride (AMARYL) 4 mg tablet Take 4 mg by mouth every morning.  losartan (COZAAR) 50 mg tablet Take  by mouth daily.  naproxen (NAPROSYN) 375 mg tablet Take 375 mg by mouth as needed.  raNITIdine (ZANTAC) 300 mg tablet Take 300 mg by mouth daily.  aspirin delayed-release 81 mg tablet Take  by mouth daily.  methIMAzole (TAPAZOLE) 5 mg tablet Taking one tablet Monday, Wednesday, and Friday.     prednisoLONE acetate (PRED FORTE) 1 % ophthalmic suspension     allopurinol (ZYLOPRIM) 100 mg tablet     COLCRYS 0.6 mg tablet     FLUoxetine (PROZAC) 20 mg capsule     atorvastatin (LIPITOR) 40 mg tablet Take  by mouth daily.  letrozole (FEMARA) 2.5 mg tablet Take 2.5 mg by mouth daily.  atropine 1 % ophthalmic solution     DUREZOL 0.05 % ophthalmic emulsion     indomethacin (INDOCIN) 50 mg capsule     ketorolac (ACULAR) 0.5 % ophthalmic solution     spironolactone (ALDACTONE) 25 mg tablet Take  by mouth daily.  PARoxetine (PAXIL) 40 mg tablet Take 40 mg by mouth daily.  esomeprazole (NEXIUM) 40 mg capsule Take  by mouth daily.  valsartan (DIOVAN) 40 mg tablet Take  by mouth daily.  Cholecalciferol, Vitamin D3, (VITAMIN D3) 1,000 unit cap Take  by mouth.  traMADol (ULTRAM) 50 mg tablet Take 50 mg by mouth every six (6) hours as needed for Pain.  ALPRAZolam (XANAX) 0.5 mg tablet Take  by mouth. No current facility-administered medications for this visit. Review of Systems:  -   - Respiratory: no cough or shortness of breath  - Gastrointestinal: no dysphagia or abdominal pain  - Musculoskeletal: no joint pains or weakness  - Integumentary: no rashes  - Neurological: no numbness, tingling, or headaches  -     Physical Examination:  Blood pressure 139/84, pulse 68, temperature 97.3 °F (36.3 °C), temperature source Oral, resp. rate 16, height 5' 2\" (1.575 m), weight 186 lb 4.8 oz (84.5 kg), SpO2 95 %. Body mass index is 34.07 kg/(m^2).   - General: pleasant, no distress, good eye contact  - HEENT: no exopthalmos, no periorbital edema, no scleral/conjunctival injection, EOMI, no lid lag or stare  - Neck: supple, no thyromegaly,  - Cardiovascular: regular,  normal S1 and S2,  - Respiratory: clear to auscultation bilaterally  - Gastrointestinal: soft, nontender, nondistended, BS +  - Musculoskeletal: no proximal muscle weakness in upper or lower extremities  - Integumentary: + tremors, no edema  - Neurological: alert and oriented   - Psychiatric: normal mood and affect  - Skin - normal turgor    Data Reviewed:   Labs from PCPs office which was done in January 2018 reviewed        [] Reviewed labs    Assessment/Plan:     1. Hyperthyroidism        she had a TSH that is below the lower limit of normal with a normal free T4 , and clinically has some symptoms of hyperthyroidism. In patients older than 72 years  there is increased risk of atrial fibrillation,osteoporosis and hence it is beneficial to treat subclinical hyperthyroidism. Methimazole 5 mg M /W/F -last TSH was normal.  The recent TSH from the hospital [place] was low, TSH assay variation versus missing medications versus need for higher dose  5 mg every day methimazole discussed. Will monitor TSH        HTN - continue present management    Chronic tremors: She is biochemically euthyroid, so not related to her thyroid condition, age-related.    :    Follow-up Disposition: Not on File    Thank you for allowing me to participate in the care of this patient.     Ally Howe MD      Patient verbalized understanding

## 2018-08-14 DIAGNOSIS — E05.90 HYPERTHYROIDISM: ICD-10-CM

## 2019-02-14 ENCOUNTER — OFFICE VISIT (OUTPATIENT)
Dept: ENDOCRINOLOGY | Age: 74
End: 2019-02-14

## 2019-02-14 VITALS
BODY MASS INDEX: 35.51 KG/M2 | OXYGEN SATURATION: 95 % | DIASTOLIC BLOOD PRESSURE: 83 MMHG | HEIGHT: 62 IN | RESPIRATION RATE: 16 BRPM | HEART RATE: 78 BPM | TEMPERATURE: 97.3 F | WEIGHT: 193 LBS | SYSTOLIC BLOOD PRESSURE: 121 MMHG

## 2019-02-14 DIAGNOSIS — G25.2 COARSE TREMORS: ICD-10-CM

## 2019-02-14 DIAGNOSIS — R22.1 MASS OF RIGHT SIDE OF NECK: ICD-10-CM

## 2019-02-14 DIAGNOSIS — E05.90 HYPERTHYROIDISM: Primary | ICD-10-CM

## 2019-02-14 PROBLEM — E66.01 SEVERE OBESITY (HCC): Status: ACTIVE | Noted: 2019-02-14

## 2019-02-14 RX ORDER — AZELASTINE HCL 205.5 UG/1
SPRAY NASAL 2 TIMES DAILY
COMMUNITY
End: 2021-07-21 | Stop reason: ALTCHOICE

## 2019-02-14 RX ORDER — ALBUTEROL SULFATE 90 UG/1
AEROSOL, METERED RESPIRATORY (INHALATION)
COMMUNITY
End: 2020-10-22 | Stop reason: SDUPTHER

## 2019-02-14 NOTE — PROGRESS NOTES
Rachelle Painter is a 68 y.o. female here for Chief Complaint Patient presents with  Thyroid Problem 1. Have you been to the ER, urgent care clinic since your last visit? Hospitalized since your last visit? -no 
 
2. Have you seen or consulted any other health care providers outside of the [address] [place] [address] since your last visit? Include any pap smears or colon screening. -PCP and Cardiology Dr. Heather Meza

## 2019-02-14 NOTE — PROGRESS NOTES
[facility] DIABETES AND ENDOCRINOLOGY Katherine Mendez MD 
 
          [address] [address] Patient Information Date:2/16/2019 Name : Lulu Ta 68 y.o.    
YOB: 1945 Referred by: Dr Lianna Stover Chief Complaint Patient presents with  Thyroid Problem History of present illness Lulu Ta is a 68 y.o. female  here for fu of hyperthyroidism. she was found to have abnormal thyroid function tests. TSH was suppressed with normal free T4 in September 2016 and repeat labs in April 2017 showed the persistent hyperthyroidism She has a history of breast cancer. On methimazole which was started in 6/17 No fever, sore throat Noted swelling for the past 2-3 weeks, right side of the neck, no associated pain, no dysphagia or dyspnea Lexi Anderson No FH of thyroid cancer Wt Readings from Last 3 Encounters:  
02/14/19 193 lb (87.5 kg) 08/08/18 186 lb 4.8 oz (84.5 kg) 02/07/18 188 lb 3.2 oz (85.4 kg) Past Medical History:  
Diagnosis Date  Breast cancer ([facility] [address]  Gout   
 HTN (hypertension)  Hyperlipidemia  Hyperthyroidism Current Outpatient Medications Medication Sig  
 azelastine (ASTEPRO) 0.15 % (205.5 mcg) two (2) times a day.  albuterol (VENTOLIN HFA) 90 mcg/actuation inhaler Take  by inhalation.  famotidine (PEPCID) 20 mg tablet Take 20 mg by mouth as needed.  glimepiride (AMARYL) 4 mg tablet Take 4 mg by mouth every morning.  losartan (COZAAR) 50 mg tablet Take 100 mg by mouth daily.  aspirin delayed-release 81 mg tablet Take  by mouth daily.  methIMAzole (TAPAZOLE) 5 mg tablet Taking one tablet daily  allopurinol (ZYLOPRIM) 100 mg tablet Take 200 mg by mouth daily.  DUREZOL 0.05 % ophthalmic emulsion  FLUoxetine (PROZAC) 20 mg capsule Take 20 mg by mouth daily.  atorvastatin (LIPITOR) 40 mg tablet Take  by mouth daily.  letrozole (FEMARA) 2.5 mg tablet Take 2.5 mg by mouth daily.  ALPRAZolam (XANAX) 0.5 mg tablet Take 0.5 mg by mouth two (2) times daily as needed.  naproxen (NAPROSYN) 375 mg tablet Take 375 mg by mouth as needed.  raNITIdine (ZANTAC) 300 mg tablet Take 300 mg by mouth daily.  prednisoLONE acetate (PRED FORTE) 1 % ophthalmic suspension  atropine 1 % ophthalmic solution  COLCRYS 0.6 mg tablet  indomethacin (INDOCIN) 50 mg capsule  ketorolac (ACULAR) 0.5 % ophthalmic solution  spironolactone (ALDACTONE) 25 mg tablet Take  by mouth daily.  PARoxetine (PAXIL) 40 mg tablet Take 40 mg by mouth daily.  esomeprazole (NEXIUM) 40 mg capsule Take  by mouth daily.  valsartan (DIOVAN) 40 mg tablet Take  by mouth daily.  Cholecalciferol, Vitamin D3, (VITAMIN D3) 1,000 unit cap Take  by mouth.  traMADol (ULTRAM) 50 mg tablet Take 50 mg by mouth every six (6) hours as needed for Pain. No current facility-administered medications for this visit. Review of Systems: 
-  
- Respiratory: no cough or shortness of breath 
- Gastrointestinal: no dysphagia or abdominal pain - Musculoskeletal: no joint pains or weakness - Integumentary: no rashes - Neurological: no numbness, tingling, or headaches - Physical Examination: 
Blood pressure 121/83, pulse 78, temperature 97.3 °F (36.3 °C), temperature source Oral, resp. rate 16, height 5' 2\" (1.575 m), weight 193 lb (87.5 kg), SpO2 95 %. Body mass index is 35.3 kg/m². - General: pleasant, no distress, good eye contact 
- HEENT: no exopthalmos, no periorbital edema, no scleral/conjunctival injection, EOMI, no lid lag or stare 
- Neck: supple, right neck as measuring 2 cm, its at the lateral border of the right lobe - Cardiovascular: regular,  normal S1 and S2, 
- Respiratory: clear to auscultation bilaterally - Gastrointestinal: soft, nontender, nondistended, BS + 
 - Musculoskeletal: no proximal muscle weakness in upper or lower extremities - Integumentary: No tremors, no edema 
- Neurological: alert and oriented - Psychiatric: normal mood and affect 
- Skin - normal turgor Data Reviewed:  
 
 
Assessment/Plan: 1. Hyperthyroidism 2. Coarse tremors 3. Mass of right side of neck Subclinical hyperthyroidism -Methimazole 2.5 mg  
 
Right neck nodule: Thyroid nodule versus lymphadenopathy Ultrasound of the neck Chronic tremors: She is biochemically euthyroid, so not related to her thyroid condition, age-related. 
 
: 
 
Follow-up Disposition: 
Return in about 6 months (around 8/14/2019). Thank you for allowing me to participate in the care of this patient. Corey Harris MD 
 
 
Patient verbalized understanding

## 2019-02-18 DIAGNOSIS — E05.90 HYPERTHYROIDISM: ICD-10-CM

## 2019-07-29 ENCOUNTER — TELEPHONE (OUTPATIENT)
Dept: ENDOCRINOLOGY | Age: 74
End: 2019-07-29

## 2019-07-29 DIAGNOSIS — I10 ESSENTIAL HYPERTENSION: ICD-10-CM

## 2019-07-29 DIAGNOSIS — E05.90 HYPERTHYROIDISM: Primary | ICD-10-CM

## 2019-08-14 ENCOUNTER — OFFICE VISIT (OUTPATIENT)
Dept: ENDOCRINOLOGY | Age: 74
End: 2019-08-14

## 2019-08-14 VITALS
DIASTOLIC BLOOD PRESSURE: 70 MMHG | TEMPERATURE: 97 F | RESPIRATION RATE: 16 BRPM | OXYGEN SATURATION: 95 % | BODY MASS INDEX: 34.78 KG/M2 | HEIGHT: 62 IN | HEART RATE: 75 BPM | SYSTOLIC BLOOD PRESSURE: 108 MMHG | WEIGHT: 189 LBS

## 2019-08-14 DIAGNOSIS — R22.1 MASS OF RIGHT SIDE OF NECK: Primary | ICD-10-CM

## 2019-08-14 DIAGNOSIS — E05.90 SUBCLINICAL HYPERTHYROIDISM: ICD-10-CM

## 2019-08-14 DIAGNOSIS — E04.1 THYROID NODULE: ICD-10-CM

## 2019-08-14 RX ORDER — VITAMIN E 268 MG
400 CAPSULE ORAL DAILY
COMMUNITY

## 2019-08-14 RX ORDER — AMLODIPINE BESYLATE 5 MG/1
5 TABLET ORAL DAILY
COMMUNITY
End: 2020-10-22 | Stop reason: SDUPTHER

## 2019-08-14 RX ORDER — OMEPRAZOLE 40 MG/1
40 CAPSULE, DELAYED RELEASE ORAL DAILY
COMMUNITY
End: 2021-01-18

## 2019-08-14 RX ORDER — GUAIFENESIN AND PHENYLEPHRINE HCL 400; 10 MG/1; MG/1
500 TABLET ORAL DAILY
COMMUNITY
End: 2022-02-11

## 2019-08-14 RX ORDER — DM/PE/ACETAMINOPHEN/CHLORPHENR 10-5-325-2
1 TABLET, SEQUENTIAL ORAL 2 TIMES DAILY
COMMUNITY
End: 2020-10-22 | Stop reason: ALTCHOICE

## 2019-08-14 NOTE — PROGRESS NOTES
[facility] AND ENDO[facility]               Russ Faria MD              [address] [address]           Patient Information  Date:8/14/2019  Name : Jaswinder James 76 y.o.     YOB: 1945         Referred by: Dr Umesh Farooq        Chief Complaint   Patient presents with    Thyroid Problem       History of present illness    Jaswinder James is a 76 y.o. female  here for fu of hyperthyroidism. she was found to have abnormal thyroid function tests. TSH was suppressed with normal free T4 in September 2016 and repeat labs in April 2017 showed the persistent hyperthyroidism   She has a history of breast cancer. On methimazole which was started in 6/17   No fever, sore throat  Neck mass on the right side, she has not had the ultrasound yet  No change in the size  No tenderness  No dysphagia        . No FH of thyroid cancer     Wt Readings from Last 3 Encounters:   08/14/19 189 lb (85.7 kg)   02/14/19 193 lb (87.5 kg)   08/08/18 186 lb 4.8 oz (84.5 kg)       Past Medical History:   Diagnosis Date    Breast cancer ([address]     Gout     HTN (hypertension)     Hyperlipidemia     Hyperthyroidism        Current Outpatient Medications   Medication Sig    amLODIPine (NORVASC) 5 mg tablet Take 5 mg by mouth daily.  omeprazole (PRILOSEC) 40 mg capsule Take 40 mg by mouth daily.  turmeric root extract 500 mg cap Take 500 mg by mouth daily.  glucosamine (GLUCOSAMINE RELIEF) 1,000 mg tab Take 1 Tab by mouth two (2) times a day.  vitamin E (AQUA GEMS) 400 unit capsule Take 400 Units by mouth daily.  peg 400-propylene glycol (SYSTANE, PROPYLENE GLYCOL,) 0.4-0.3 % drop as needed.  multivit-minerals/folic acid (ADULT MULTIVITAMIN GUMMIES PO) Take 2 Tabs by mouth daily.  azelastine (ASTEPRO) 0.15 % (205.5 mcg) two (2) times a day.  albuterol (VENTOLIN HFA) 90 mcg/actuation inhaler Take  by inhalation.     glimepiride (AMARYL) 4 mg tablet Take 4 mg by mouth every morning.  losartan (COZAAR) 50 mg tablet Take 100 mg by mouth daily.  aspirin delayed-release 81 mg tablet Take  by mouth daily.  methIMAzole (TAPAZOLE) 5 mg tablet Taking one tablet daily (Patient taking differently: Take 2.5 mg by mouth daily. Taking one tablet daily)    allopurinol (ZYLOPRIM) 100 mg tablet Take 200 mg by mouth daily.  COLCRYS 0.6 mg tablet     FLUoxetine (PROZAC) 20 mg capsule Take 20 mg by mouth daily.  atorvastatin (LIPITOR) 40 mg tablet Take  by mouth daily.  letrozole (FEMARA) 2.5 mg tablet Take 2.5 mg by mouth daily.  ALPRAZolam (XANAX) 0.5 mg tablet Take 0.5 mg by mouth two (2) times daily as needed. No current facility-administered medications for this visit. Review of Systems:  -   - Respiratory: no cough or shortness of breath  - Gastrointestinal: no dysphagia or abdominal pain  - Musculoskeletal: no joint pains or weakness  - Integumentary: no rashes  - Neurological: no numbness, tingling, or headaches  -     Physical Examination:  Blood pressure 108/70, pulse 75, temperature 97 °F (36.1 °C), temperature source Oral, resp. rate 16, height 5' 2\" (1.575 m), weight 189 lb (85.7 kg), SpO2 95 %. Body mass index is 34.57 kg/m². - General: pleasant, no distress, good eye contact  - HEENT: no exopthalmos, no periorbital edema, no scleral/conjunctival injection, EOMI, no lid lag or stare  - Neck: supple, right neck as measuring 2 cm, its at the lateral border of the right lobe  - Cardiovascular: regular,  normal S1 and S2,  - Respiratory: clear to auscultation bilaterally  - Gastrointestinal: soft, nontender, nondistended, BS +  - Musculoskeletal: no proximal muscle weakness in upper or lower extremities  - Integumentary: No tremors, no edema  - Neurological: alert and oriented   - Psychiatric: normal mood and affect  - Skin - normal turgor    Data Reviewed:       Assessment/Plan:     1. Mass of right side of neck    2. Thyroid nodule    3. Subclinical hyperthyroidism        Subclinical hyperthyroidism -Methimazole 2.5 mg  Biochemically euthyroid    Right neck nodule: Thyroid nodule versus lymphadenopathy  Ultrasound of the neck, ordered again, discussed the importance of getting the ultrasound      Chronic tremors: She is biochemically euthyroid, so not related to her thyroid condition, age-related.    :    Follow-up and Dispositions    · Return in about 6 months (around 2/14/2020). Thank you for allowing me to participate in the care of this patient.     Clint Brooks MD      Patient verbalized understanding

## 2019-08-14 NOTE — PROGRESS NOTES
Orestes Bear is a 76 y.o. female here for   Chief Complaint   Patient presents with    Thyroid Problem     1. Have you been to the ER, urgent care clinic since your last visit? Hospitalized since your last visit? -no    2. Have you seen or consulted any other health care providers outside of the [address] [place] [address] since your last visit? Include any pap smears or colon screening. -PCP

## 2019-08-14 NOTE — LETTER
8/14/19 Patient: Sierra Briseno YOB: 1945 Date of Visit: 8/14/2019 Charito Snyder MD 
[facility] [address] [address] VIA Facsimile: [phone] Dear Charito Snyder MD, Thank you for referring Ms. Sierra Briseno to [address] [place] [address] for evaluation. My notes for this consultation are attached. If you have questions, please do not hesitate to call me. I look forward to following your patient along with you. Sincerely, Lc Nagy MD

## 2019-08-21 DIAGNOSIS — E05.90 HYPERTHYROIDISM: ICD-10-CM

## 2019-08-21 DIAGNOSIS — I10 ESSENTIAL HYPERTENSION: ICD-10-CM

## 2019-08-29 ENCOUNTER — TELEPHONE (OUTPATIENT)
Dept: ENDOCRINOLOGY | Age: 74
End: 2019-08-29

## 2019-08-29 NOTE — TELEPHONE ENCOUNTER
Informed pt per Dr Xavier Bass that she has thyroid nodules on both sides. Explained to pt that she needs FNA of left and right thyroid nodules. Pt verbalized understanding and states she will call office back to schedule BX appt.

## 2019-09-06 DIAGNOSIS — E04.1 THYROID NODULE: ICD-10-CM

## 2019-09-06 DIAGNOSIS — R22.1 MASS OF RIGHT SIDE OF NECK: ICD-10-CM

## 2019-09-09 LAB — MAMMOGRAPHY, EXTERNAL: NORMAL

## 2019-09-30 NOTE — PROGRESS NOTES
Dmitriy Taylor is a 76 y.o. female here for   Chief Complaint   Patient presents with    Thyroid Problem    Biopsy       1. Have you been to the ER, urgent care clinic since your last visit? Hospitalized since your last visit? -no    2. Have you seen or consulted any other health care providers outside of the [address] [place] [address] since your last visit? Include any pap smears or colon screening. -PCP    Order placed for pt per verbal order with read back from Dr. SAMUEL [facility] 10/01/19

## 2019-10-01 ENCOUNTER — OP HISTORICAL/CONVERTED ENCOUNTER (OUTPATIENT)
Dept: OTHER | Age: 74
End: 2019-10-01

## 2019-10-01 ENCOUNTER — OFFICE VISIT (OUTPATIENT)
Dept: ENDOCRINOLOGY | Age: 74
End: 2019-10-01

## 2019-10-01 VITALS
TEMPERATURE: 97.5 F | RESPIRATION RATE: 14 BRPM | DIASTOLIC BLOOD PRESSURE: 76 MMHG | BODY MASS INDEX: 33.86 KG/M2 | HEART RATE: 70 BPM | HEIGHT: 62 IN | SYSTOLIC BLOOD PRESSURE: 111 MMHG | OXYGEN SATURATION: 95 % | WEIGHT: 184 LBS

## 2019-10-01 DIAGNOSIS — E04.1 THYROID NODULE: ICD-10-CM

## 2019-10-01 DIAGNOSIS — E05.90 HYPERTHYROIDISM: Primary | ICD-10-CM

## 2019-10-01 DIAGNOSIS — E04.2 MULTINODULAR GOITER: ICD-10-CM

## 2019-10-01 PROCEDURE — 88173 CYTOPATH EVAL FNA REPORT: CPT

## 2019-10-01 RX ORDER — METOPROLOL SUCCINATE 25 MG/1
TABLET, EXTENDED RELEASE ORAL
Refills: 1 | COMMUNITY
Start: 2019-09-26

## 2019-10-01 NOTE — LETTER
10/3/19 Patient: Jhon Colon YOB: 1945 Date of Visit: 10/1/2019 Breana Talbert MD 
[facility] [address] [address] VIA Facsimile: [phone] Dear Breana Talbert MD, Thank you for referring Ms. Jhon Colon to [address] [place] [address] for evaluation. My notes for this consultation are attached. If you have questions, please do not hesitate to call me. I look forward to following your patient along with you. Sincerely, Aniket Smith MD

## 2019-10-01 NOTE — PROGRESS NOTES
[facility] AND ENDOCRINOLOG[facility]               Ector Giles MD              [address] [address]           Patient Information  Date:10/1/2019  Name : Kal Andrew 76 y.o.     YOB: 1945         Referred by: Dr Lacey Ruffin        Chief Complaint   Patient presents with    Thyroid Problem    Biopsy       History of present illness    Kal Andrew is a 76 y.o. female  here for fu of hyperthyroidism. she was found to have abnormal thyroid function tests. TSH was suppressed with normal free T4 in September 2016 and repeat labs in April 2017 showed the persistent hyperthyroidism   She has a history of breast cancer. On methimazole which was started in 6/17   Ultrasound thyroid showed multinodular goiter with bilateral nodules        . No FH of thyroid cancer     Wt Readings from Last 3 Encounters:   10/01/19 184 lb (83.5 kg)   08/14/19 189 lb (85.7 kg)   02/14/19 193 lb (87.5 kg)       Past Medical History:   Diagnosis Date    Breast cancer ([facility] [address]     Gout     HTN (hypertension)     Hyperlipidemia     Hyperthyroidism        Current Outpatient Medications   Medication Sig    metoprolol succinate (TOPROL-XL) 25 mg XL tablet TAKE 1 2 (ONE HALF) TABLET BY MOUTH ONCE DAILY    linaGLIPtin (TRADJENTA) 5 mg tablet Take 5 mg by mouth daily.  amLODIPine (NORVASC) 5 mg tablet Take 5 mg by mouth daily.  omeprazole (PRILOSEC) 40 mg capsule Take 40 mg by mouth daily.  turmeric root extract 500 mg cap Take 500 mg by mouth daily.  glucosamine (GLUCOSAMINE RELIEF) 1,000 mg tab Take 1 Tab by mouth two (2) times a day.  vitamin E (AQUA GEMS) 400 unit capsule Take 400 Units by mouth daily.  peg 400-propylene glycol (SYSTANE, PROPYLENE GLYCOL,) 0.4-0.3 % drop as needed.  multivit-minerals/folic acid (ADULT MULTIVITAMIN GUMMIES PO) Take 2 Tabs by mouth daily.  azelastine (ASTEPRO) 0.15 % (205.5 mcg) two (2) times a day.     albuterol (VENTOLIN HFA) 90 mcg/actuation inhaler Take  by inhalation.  losartan (COZAAR) 100 mg tablet Take 100 mg by mouth daily.  aspirin delayed-release 81 mg tablet Take 81 mg by mouth daily.  methIMAzole (TAPAZOLE) 5 mg tablet Taking one tablet daily (Patient taking differently: Take 2.5 mg by mouth daily. Taking one tablet daily)    allopurinol (ZYLOPRIM) 100 mg tablet Take 200 mg by mouth daily.  COLCRYS 0.6 mg tablet     FLUoxetine (PROZAC) 20 mg capsule Take 20 mg by mouth daily.  atorvastatin (LIPITOR) 40 mg tablet Take 40 mg by mouth daily.  letrozole (FEMARA) 2.5 mg tablet Take 2.5 mg by mouth daily.  ALPRAZolam (XANAX) 0.5 mg tablet Take 0.5 mg by mouth two (2) times daily as needed.  glimepiride (AMARYL) 4 mg tablet Take 4 mg by mouth every morning. No current facility-administered medications for this visit. Review of Systems:  -   - Respiratory: no cough or shortness of breath  - Gastrointestinal: no dysphagia or abdominal pain  - Musculoskeletal: no joint pains or weakness  - Integumentary: no rashes  - Neurological: no numbness, tingling, or headaches  -     Physical Examination:  Blood pressure 111/76, pulse 70, temperature 97.5 °F (36.4 °C), temperature source Oral, resp. rate 14, height 5' 2\" (1.575 m), weight 184 lb (83.5 kg), SpO2 95 %. Body mass index is 33.65 kg/m².   - General: pleasant, no distress, good eye contact  - HEENT: no exopthalmos, no periorbital edema, no scleral/conjunctival injection, EOMI, no lid lag or stare  - Neck: supple, right neck as measuring 2 cm, its at the lateral border of the right lobe  - Cardiovascular: regular,  normal S1 and S2,  - Respiratory: clear to auscultation bilaterally  - Gastrointestinal: soft, nontender, nondistended, BS +  - Musculoskeletal: no proximal muscle weakness in upper or lower extremities  - Integumentary: No tremors, no edema  - Neurological: alert and oriented   - Psychiatric: normal mood and affect  - Skin - normal turgor    Data Reviewed:       Assessment/Plan:     1. Hyperthyroidism        Subclinical hyperthyroidism -Methimazole 2.5 mg  Biochemically euthyroid    Multinodular goiter: Right thyroid complex cyst  Left thyroid nodule  FNA October 2019      Chronic tremors: She is biochemically euthyroid, so not related to her thyroid condition, age-related.    :        Thank you for allowing me to participate in the care of this patient.     Maddie Sanchez MD      Patient verbalized understanding

## 2019-10-02 ENCOUNTER — HOSPITAL ENCOUNTER (OUTPATIENT)
Dept: LAB | Age: 74
Discharge: HOME | End: 2019-10-02

## 2019-10-03 PROCEDURE — 88173 CYTOPATH EVAL FNA REPORT: CPT

## 2019-10-03 NOTE — PROGRESS NOTES
[facility]  OFFICE PROCEDURE PROGRESS NOTE        Chart reviewed for the following:   Dave Childers MD, have reviewed the History, Physical and updated the Allergic reactions for Jhon Boot     Procedure performed by:  Aniket Smith MD    Assisted by:Veena Enriquez LPN/Marti Fulton LPN    TIME OUT performed immediately prior to start of procedure:   Dave Childers MD, have performed the following reviews on Jhon Boot prior to the start of the procedure:            * Patient was identified by name and date of birth   * Agreement on procedure being performed was verified  * Explained procedure and answered questions  * Procedure site verified and marked as necessary  * Patient was positioned for comfort  * Consent was signed and verified      The risks, benefits and alternatives of ultrasound guided thyroid fine   needle aspiration were discussed with the patient. After all questions were   answered and informed written consent was obtained, patient was prepped  in a supine position. 1% lidocaine was used as local anesthetic. Using sonographic guidance, fine needle aspiration of thyroid  nodule was performed using 25-gauge needles. 4 aspirations were made using 25  G needles  Samples were submitted to cytology. Patient  tolerated procedure well without complications. Advised to keep ice for 30 minutes after going home. After care instructions provided.   Patient was told to expect a call in 2 weeks regarding the results       Pain at the site before procedure 0/10   Pain post procedure  0/10      [facility]  OFFICE PROCEDURE PROGRESS NOTE        Chart reviewed for the following:   Dave Childers MD, have reviewed the History, Physical and updated the Allergic reactions for Jhon Boot     Procedure performed by:  Aniket Smith MD    Assisted by:Veena Enriquze LPN/Marti Fulton LPN    TIME OUT performed immediately prior to start of procedure:   Shazia Gamboa MD, have performed the following reviews on Damian Lebron prior to the start of the procedure:            * Patient was identified by name and date of birth   * Agreement on procedure being performed was verified  * Explained procedure and answered questions  * Procedure site verified and marked as necessary  * Patient was positioned for comfort  * Consent was signed and verified      The risks, benefits and alternatives of ultrasound guided thyroid fine   needle aspiration were discussed with the patient. After all questions were   answered and informed written consent was obtained, patient was prepped  in a supine position. 1% lidocaine was used as local anesthetic. Using sonographic guidance, fine needle aspiration of thyroid  nodule was performed using 25-gauge needles. 4 aspirations were made using 25  G needles  Samples were submitted to cytology. Patient  tolerated procedure well without complications. Advised to keep ice for 30 minutes after going home. After care instructions provided.   Patient was told to expect a call in 2 weeks regarding the results       Pain at the site before procedure 0/10   Pain post procedure  0/10

## 2019-10-07 NOTE — PROGRESS NOTES
Right thyroid nodule biopsy: No thyroid cancer cells seen    The left lobe which is the smaller one, could not see much thyroid cells, we need to monitor this and may have to repeat the biopsy in the future

## 2019-10-08 ENCOUNTER — TELEPHONE (OUTPATIENT)
Dept: ENDOCRINOLOGY | Age: 74
End: 2019-10-08

## 2019-10-08 DIAGNOSIS — E05.90 HYPERTHYROIDISM: ICD-10-CM

## 2019-10-08 NOTE — TELEPHONE ENCOUNTER
----- Message from Dominick Cruz MD sent at 10/7/2019  4:36 PM EDT -----  Right thyroid nodule biopsy: No thyroid cancer cells seen    The left lobe which is the smaller one, could not see much thyroid cells, we need to monitor this and may have to repeat the biopsy in the future

## 2019-10-08 NOTE — TELEPHONE ENCOUNTER
Informed pt of Dr. Sloane Cross note. Pt verbalized understanding with no further questions or concerns at this time. Pt has lab slips and f/u scheduled.

## 2019-10-09 RX ORDER — METHIMAZOLE 5 MG/1
2.5 TABLET ORAL DAILY
Qty: 45 TAB | Refills: 5 | Status: SHIPPED | OUTPATIENT
Start: 2019-10-09 | End: 2020-10-14 | Stop reason: SDUPTHER

## 2019-10-10 ENCOUNTER — TELEPHONE (OUTPATIENT)
Dept: ENDOCRINOLOGY | Age: 74
End: 2019-10-10

## 2019-10-10 NOTE — TELEPHONE ENCOUNTER
Pharmacy called to get clarification on methimazole directions. Directions say to \"Take 0.5 Tabs by mouth daily. Taking one tablet daily\". Informed pharmacist that per Dr. Fabiola Cai last note, pt to take 2.5 mg daily. She verbalized understanding with no further questions or concerns at this time.

## 2019-10-22 ENCOUNTER — OP HISTORICAL/CONVERTED ENCOUNTER (OUTPATIENT)
Dept: OTHER | Age: 74
End: 2019-10-22

## 2019-11-11 ENCOUNTER — OP HISTORICAL/CONVERTED ENCOUNTER (OUTPATIENT)
Dept: OTHER | Age: 74
End: 2019-11-11

## 2020-03-04 LAB — HBA1C MFR BLD HPLC: 6.7 %

## 2020-06-20 VITALS
RESPIRATION RATE: 20 BRPM | SYSTOLIC BLOOD PRESSURE: 127 MMHG | WEIGHT: 182.38 LBS | DIASTOLIC BLOOD PRESSURE: 70 MMHG | HEIGHT: 64 IN | OXYGEN SATURATION: 94 % | TEMPERATURE: 98.7 F | HEART RATE: 76 BPM | BODY MASS INDEX: 31.14 KG/M2

## 2020-06-20 PROBLEM — D35.00 ADRENAL ADENOMA: Status: ACTIVE | Noted: 2017-04-10

## 2020-06-20 PROBLEM — E55.9 VITAMIN D DEFICIENCY: Status: ACTIVE | Noted: 2017-04-10

## 2020-06-20 PROBLEM — K21.9 GASTROESOPHAGEAL REFLUX DISEASE: Status: ACTIVE | Noted: 2017-04-10

## 2020-06-20 PROBLEM — E78.5 DYSLIPIDEMIA: Status: ACTIVE | Noted: 2017-04-10

## 2020-06-20 PROBLEM — F32.A DEPRESSIVE DISORDER: Status: ACTIVE | Noted: 2017-04-10

## 2020-06-20 PROBLEM — I25.10 CORONARY ARTERIOSCLEROSIS: Status: ACTIVE | Noted: 2017-04-10

## 2020-06-20 PROBLEM — E11.65 TYPE 2 DIABETES MELLITUS WITH HYPERGLYCEMIA (HCC): Status: ACTIVE | Noted: 2019-09-05

## 2020-06-20 PROBLEM — E04.1 THYROID NODULE: Status: ACTIVE | Noted: 2019-09-05

## 2020-06-20 PROBLEM — Z72.0 TOBACCO USER: Status: ACTIVE | Noted: 2017-04-10

## 2020-06-20 PROBLEM — L98.9 LESION OF SKIN OF FACE: Status: ACTIVE | Noted: 2017-04-10

## 2020-06-20 PROBLEM — F43.21 ADJUSTMENT DISORDER WITH DEPRESSED MOOD: Status: ACTIVE | Noted: 2017-04-10

## 2020-06-20 PROBLEM — N18.30 CHRONIC KIDNEY DISEASE, STAGE 3 (MODERATE): Status: ACTIVE | Noted: 2019-09-05

## 2020-06-20 PROBLEM — R22.1 SUBCUTANEOUS MASS OF NECK: Status: ACTIVE | Noted: 2019-03-25

## 2020-06-20 PROBLEM — L68.0 HIRSUTISM: Status: ACTIVE | Noted: 2017-04-10

## 2020-06-20 PROBLEM — G47.00 INSOMNIA: Status: ACTIVE | Noted: 2017-04-10

## 2020-06-20 PROBLEM — E78.2 MIXED HYPERLIPIDEMIA: Status: ACTIVE | Noted: 2019-09-05

## 2020-06-20 PROBLEM — E87.5 HYPERKALEMIA: Status: ACTIVE | Noted: 2017-04-10

## 2020-06-20 PROBLEM — F41.9 ANXIETY DISORDER: Status: ACTIVE | Noted: 2017-04-10

## 2020-06-20 PROBLEM — H26.9 CATARACT: Status: ACTIVE | Noted: 2017-04-10

## 2020-06-20 RX ORDER — DICLOFENAC SODIUM 10 MG/G
2 GEL TOPICAL 4 TIMES DAILY
COMMUNITY
End: 2021-09-07

## 2020-06-20 RX ORDER — GLIPIZIDE 5 MG/1
TABLET ORAL
COMMUNITY
End: 2020-06-26

## 2020-06-20 RX ORDER — BLOOD SUGAR DIAGNOSTIC
STRIP MISCELLANEOUS
COMMUNITY
End: 2021-01-06 | Stop reason: ALTCHOICE

## 2020-06-20 RX ORDER — BLOOD-GLUCOSE METER
EACH MISCELLANEOUS
COMMUNITY

## 2020-06-20 RX ORDER — LANCETS 33 GAUGE
EACH MISCELLANEOUS
COMMUNITY
End: 2020-10-22 | Stop reason: ALTCHOICE

## 2020-06-26 ENCOUNTER — OFFICE VISIT (OUTPATIENT)
Dept: ENDOCRINOLOGY | Age: 75
End: 2020-06-26

## 2020-06-26 VITALS
RESPIRATION RATE: 18 BRPM | WEIGHT: 185 LBS | HEART RATE: 65 BPM | OXYGEN SATURATION: 95 % | SYSTOLIC BLOOD PRESSURE: 99 MMHG | TEMPERATURE: 96.5 F | BODY MASS INDEX: 31.58 KG/M2 | HEIGHT: 64 IN | DIASTOLIC BLOOD PRESSURE: 85 MMHG

## 2020-06-26 DIAGNOSIS — E05.90 SUBCLINICAL HYPERTHYROIDISM: ICD-10-CM

## 2020-06-26 DIAGNOSIS — E04.9 NODULAR GOITER: ICD-10-CM

## 2020-06-26 DIAGNOSIS — I10 ESSENTIAL HYPERTENSION: ICD-10-CM

## 2020-06-26 DIAGNOSIS — E04.9 NODULAR GOITER: Primary | ICD-10-CM

## 2020-06-26 RX ORDER — CHOLECALCIFEROL (VITAMIN D3) 125 MCG
CAPSULE ORAL
COMMUNITY

## 2020-06-26 NOTE — LETTER
6/27/20 Patient: Shellie Thurman YOB: 1945 Date of Visit: 6/26/2020 Matilde Barcenas MD 
[address] [facility],[address] [address] VIA In Basket Dear Matilde Barcenas MD, Thank you for referring Ms. Shellie Thurman to [address] [place] [address] for evaluation. My notes for this consultation are attached. If you have questions, please do not hesitate to call me. I look forward to following your patient along with you. Sincerely, Zeb Bland MD

## 2020-06-26 NOTE — PROGRESS NOTES
Dieter Maxwell is a 76 y.o. female here for   Chief Complaint   Patient presents with    Thyroid Problem       1. Have you been to the ER, urgent care clinic since your last visit? Hospitalized since your last visit? -no    2. Have you seen or consulted any other health care providers outside of the [address] [place] [address] since your last visit?   Include any pap smears or colon screening.-no

## 2020-06-26 NOTE — PROGRESS NOTES
[facility]               Tyler Menendez MD          Patient Information  Date:8/13/2020  Name : Cindi Moore 76 y.o.     YOB: 1945         Referred by: Dr Dionicio Schwartz        Chief Complaint   Patient presents with    Thyroid Problem       History of present illness    Cindi Moore is a 76 y.o. female  here for fu of hyperthyroidism. she was found to have abnormal thyroid function tests. TSH was suppressed with normal free T4 in September 2016 and repeat labs in April 2017 showed the persistent hyperthyroidism   She has a history of breast cancer.     On methimazole which was started in 6/17   Ultrasound thyroid showed multinodular goiter with bilateral nodules  No dysphagia, intermittently has pain in the right thyroid nodule  Blood pressure is low, intermittent lightheadedness  No fever, cough, nausea, vomiting  No chest pain  No recent thyroid blood test        .No FH of thyroid cancer     Wt Readings from Last 3 Encounters:   07/31/20 182 lb 6.4 oz (82.7 kg)   06/26/20 185 lb (83.9 kg)   03/04/20 182 lb 6 oz (82.7 kg)       Past Medical History:   Diagnosis Date    Adjustment disorder with depressed mood 4/10/2017    Adrenal adenoma 4/10/2017    Anxiety disorder 4/10/2017    Arthritis     Atherosclerosis     Backache 7/31/2020    Benign essential hypertension 7/31/2020    Breast cancer ([facility] [address]     Carcinoma in situ of breast 7/31/2020    Cataract 4/10/2017    Chronic kidney disease, stage 3 (moderate) ([address] 9/5/2019    Coronary arteriosclerosis 4/10/2017    Dehydration 7/31/2020    Depressive disorder 4/10/2017    Dyslipidemia 4/10/2017    Dyspnea 7/31/2020    Gastroesophageal reflux disease 4/10/2017    Gout     Heart disease     Heartburn     Hirsutism 4/10/2017    History of malignant neoplasm of breast 7/31/2020    HTN (hypertension)     Hyperkalemia 4/10/2017    Hyperlipidemia     Hypertensive disorder 7/31/2020    Hypertensive renal disease 7/31/2020    Hyperthyroidism     Insomnia 4/10/2017    Kidney disease     Lesion of skin of face 4/10/2017    Mixed hyperlipidemia 9/5/2019    Mixed hyperlipidemia due to type 2 diabetes mellitus ([facility] [address] 7/31/2020    Subcutaneous mass of neck 3/25/2019    Thyroid disease     Thyroid nodule 9/5/2019    Tobacco user 4/10/2017    Type 2 diabetes mellitus with hyperglycemia ([facility] [address] 9/5/2019    Varicose vein of leg     Vitamin D deficiency 4/10/2017       Current Outpatient Medications   Medication Sig    cholecalciferol, vitamin D3, (Vitamin D3) 50 mcg (2,000 unit) tab Take  by mouth.  diclofenac (VOLTAREN) 1 % gel Apply 2 g to affected area four (4) times daily.  OneTouch Verio Flex meter misc USE TO CHECK BLOOD SUGAR TWICE A DAY    OneTouch Verio test strips strip USE WITH BLOOD GLUCOSE METER TO CHECK BLOOD SUGAR TWICE A DAY    OneTouch Delica Plus Lancet 33 gauge misc USE TO CHECK BLOOD SUGAR TWICE A DAY    methIMAzole (TAPAZOLE) 5 mg tablet Take 0.5 Tabs by mouth daily. Taking one tablet daily    metoprolol succinate (TOPROL-XL) 25 mg XL tablet TAKE 1 2 (ONE HALF) TABLET BY MOUTH ONCE DAILY    linaGLIPtin (TRADJENTA) 5 mg tablet Take 5 mg by mouth daily.  amLODIPine (NORVASC) 5 mg tablet Take 5 mg by mouth daily.  omeprazole (PRILOSEC) 40 mg capsule Take 40 mg by mouth daily.  turmeric root extract 500 mg cap Take 500 mg by mouth daily.  glucosamine (GLUCOSAMINE RELIEF) 1,000 mg tab Take 1 Tab by mouth two (2) times a day.  vitamin E (AQUA GEMS) 400 unit capsule Take 400 Units by mouth daily.  peg 400-propylene glycol (SYSTANE, PROPYLENE GLYCOL,) 0.4-0.3 % drop as needed.  multivit-minerals/folic acid (ADULT MULTIVITAMIN GUMMIES PO) Take 2 Tabs by mouth daily.  azelastine (ASTEPRO) 0.15 % (205.5 mcg) two (2) times a day.  albuterol (VENTOLIN HFA) 90 mcg/actuation inhaler Take  by inhalation.  losartan (COZAAR) 100 mg tablet Take 100 mg by mouth daily.     aspirin delayed-release 81 mg tablet Take 81 mg by mouth daily.  allopurinol (ZYLOPRIM) 100 mg tablet Take 200 mg by mouth daily.  COLCRYS 0.6 mg tablet     FLUoxetine (PROZAC) 20 mg capsule Take 20 mg by mouth daily.  atorvastatin (LIPITOR) 40 mg tablet Take 40 mg by mouth daily.  letrozole (FEMARA) 2.5 mg tablet Take 2.5 mg by mouth daily.  ALPRAZolam (XANAX) 0.5 mg tablet Take 0.5 mg by mouth two (2) times daily as needed.  famotidine (PEPCID) 20 mg tablet Take 20 mg by mouth two (2) times a day.  glimepiride (AMARYL) 4 mg tablet Take  by mouth every morning.  glipiZIDE (GLUCOTROL) 5 mg tablet Take  by mouth two (2) times a day.  levalbuterol tartrate (XOPENEX) 45 mcg/actuation inhaler Take  by inhalation.  valsartan (DIOVAN) 80 mg tablet Take  by mouth daily.  raNITIdine (ZANTAC) 300 mg tab Take  by mouth two (2) times a day. No current facility-administered medications for this visit. Review of Systems:  -   - Respiratory: no cough or shortness of breath  - Gastrointestinal: no dysphagia or abdominal pain  - Musculoskeletal: no joint pains or weakness  - Integumentary: no rashes  - Neurological: no numbness, tingling, or headaches  -     Physical Examination:  Blood pressure 99/85, pulse 65, temperature (!) 96.5 °F (35.8 °C), temperature source Oral, resp. rate 18, height 5' 4\" (1.626 m), weight 185 lb (83.9 kg), SpO2 95 %. Body mass index is 31.76 kg/m².   - General: pleasant, no distress, good eye contact  - HEENT: no exopthalmos, no periorbital edema, no scleral/conjunctival injection, EOMI, no lid lag or stare  - Neck: supple, 2 cm nodule, its at the lateral border of the right lobe, nontender  - Cardiovascular: regular,  normal S1 and S2,  - Respiratory: clear to auscultation bilaterally  - Gastrointestinal: soft, nontender, nondistended, BS +  - Musculoskeletal: no proximal muscle weakness in upper or lower extremities  - Integumentary: No tremors, no edema  - Neurological: alert and oriented   - Psychiatric: normal mood and affect  - Skin - normal turgor    Data Reviewed:       Assessment/Plan:     1. Nodular goiter    2. Essential hypertension    3. Subclinical hyperthyroidism        Subclinical hyperthyroidism -Methimazole 2.5 mg  Given age treating for subclinical hyperthyroidism due to the risk of atrial fibrillation, bone loss as well as dementia  Labs soon    Multinodular goiter: Right thyroid complex cyst 4.5 cm: FNA October 2019 benign  Left thyroid nodule, largest 2 cm-FNA October 2019: Insufficient cells, will monitor and consider repeat biopsy. One of them could be autonomous thyroid nodule, difficult to travel to [place] for radioactive iodine therapy. We will hold off on thyroid uptake and scan. Continue methimazole        Chronic tremors: She is biochemically euthyroid, so not related to her thyroid condition, age-related.    :  Hypertension: Blood pressure has been low in the office, she is on 3 antihypertensives, complains of dizziness  Asked her to hold amlodipine, monitor the blood pressure, if elevated she can go back. Continue to follow PCP  Follow-up and Dispositions    · Return in about 4 months (around 10/26/2020). Thank you for allowing me to participate in the care of this patient.     Christine Schulte MD      Patient verbalized understanding

## 2020-07-31 VITALS
DIASTOLIC BLOOD PRESSURE: 70 MMHG | HEART RATE: 76 BPM | OXYGEN SATURATION: 94 % | TEMPERATURE: 98.7 F | HEIGHT: 64 IN | WEIGHT: 182.4 LBS | BODY MASS INDEX: 31.14 KG/M2 | SYSTOLIC BLOOD PRESSURE: 127 MMHG

## 2020-07-31 PROBLEM — I12.9 HYPERTENSIVE RENAL DISEASE: Status: ACTIVE | Noted: 2020-07-31

## 2020-07-31 PROBLEM — R06.00 DYSPNEA: Status: ACTIVE | Noted: 2020-07-31

## 2020-07-31 PROBLEM — E86.0 DEHYDRATION: Status: ACTIVE | Noted: 2020-07-31

## 2020-07-31 PROBLEM — E11.69 MIXED HYPERLIPIDEMIA DUE TO TYPE 2 DIABETES MELLITUS (HCC): Status: ACTIVE | Noted: 2020-07-31

## 2020-07-31 PROBLEM — I10 HYPERTENSIVE DISORDER: Status: ACTIVE | Noted: 2020-07-31

## 2020-07-31 PROBLEM — M54.9 BACKACHE: Status: ACTIVE | Noted: 2020-07-31

## 2020-07-31 PROBLEM — Z85.3 HISTORY OF MALIGNANT NEOPLASM OF BREAST: Status: ACTIVE | Noted: 2020-07-31

## 2020-07-31 PROBLEM — I10 BENIGN ESSENTIAL HYPERTENSION: Status: ACTIVE | Noted: 2020-07-31

## 2020-07-31 PROBLEM — E78.2 MIXED HYPERLIPIDEMIA DUE TO TYPE 2 DIABETES MELLITUS (HCC): Status: ACTIVE | Noted: 2020-07-31

## 2020-07-31 PROBLEM — D05.90 CARCINOMA IN SITU OF BREAST: Status: ACTIVE | Noted: 2020-07-31

## 2020-07-31 RX ORDER — GLIPIZIDE 5 MG/1
TABLET ORAL 2 TIMES DAILY
COMMUNITY
End: 2020-11-20 | Stop reason: SDUPTHER

## 2020-07-31 RX ORDER — GLIMEPIRIDE 4 MG/1
TABLET ORAL
COMMUNITY
End: 2020-10-22 | Stop reason: ALTCHOICE

## 2020-07-31 RX ORDER — RANITIDINE 300 MG/1
TABLET ORAL 2 TIMES DAILY
COMMUNITY
End: 2020-10-22 | Stop reason: ALTCHOICE

## 2020-07-31 RX ORDER — LEVALBUTEROL TARTRATE 45 UG/1
AEROSOL, METERED ORAL
COMMUNITY
End: 2020-10-22 | Stop reason: ALTCHOICE

## 2020-07-31 RX ORDER — VALSARTAN 80 MG/1
TABLET ORAL DAILY
COMMUNITY
End: 2020-12-02 | Stop reason: ALTCHOICE

## 2020-07-31 RX ORDER — FAMOTIDINE 20 MG/1
20 TABLET, FILM COATED ORAL 2 TIMES DAILY
COMMUNITY
End: 2020-10-22 | Stop reason: ALTCHOICE

## 2020-08-12 LAB — T4 FREE SERPL-MCNC: 0.81 NG/DL (ref 0.82–1.77)

## 2020-08-13 NOTE — PROGRESS NOTES
I had ordered TSH along with free T4, only free T4 is available. Please see if you can add it to sample in the lab.

## 2020-08-14 ENCOUNTER — TELEPHONE (OUTPATIENT)
Dept: ENDOCRINOLOGY | Age: 75
End: 2020-08-14

## 2020-08-14 NOTE — TELEPHONE ENCOUNTER
----- Message from Van Brewer MD sent at 8/13/2020  4:15 PM EDT -----  I had ordered TSH along with free T4, only free T4 is available. Please see if you can add it to sample in the lab.

## 2020-08-17 PROBLEM — K11.20 SIALOADENITIS OF SUBMANDIBULAR GLAND: Status: ACTIVE | Noted: 2020-08-17

## 2020-08-17 PROBLEM — K63.5 POLYP OF COLON: Status: ACTIVE | Noted: 2020-08-17

## 2020-08-17 PROBLEM — R25.1 TREMOR: Status: ACTIVE | Noted: 2020-08-17

## 2020-08-17 PROBLEM — R51.9 HEADACHE DISORDER: Status: ACTIVE | Noted: 2020-08-17

## 2020-08-17 PROBLEM — M10.9 GOUT: Status: ACTIVE | Noted: 2020-08-17

## 2020-08-17 RX ORDER — ESOMEPRAZOLE MAGNESIUM 40 MG/1
CAPSULE, DELAYED RELEASE ORAL DAILY
COMMUNITY
End: 2020-10-22 | Stop reason: ALTCHOICE

## 2020-08-17 RX ORDER — PREDNISOLONE ACETATE 10 MG/ML
1 SUSPENSION/ DROPS OPHTHALMIC 4 TIMES DAILY
COMMUNITY
End: 2020-10-22 | Stop reason: ALTCHOICE

## 2020-08-17 RX ORDER — TRAMADOL HYDROCHLORIDE 50 MG/1
50 TABLET ORAL
COMMUNITY
End: 2020-10-22 | Stop reason: ALTCHOICE

## 2020-08-17 RX ORDER — CLOBETASOL PROPIONATE 0.5 MG/G
OINTMENT TOPICAL 2 TIMES DAILY
COMMUNITY
End: 2020-10-22 | Stop reason: ALTCHOICE

## 2020-08-17 RX ORDER — LEVOFLOXACIN 500 MG/1
TABLET, FILM COATED ORAL DAILY
COMMUNITY
End: 2020-10-22 | Stop reason: ALTCHOICE

## 2020-08-17 RX ORDER — INDOMETHACIN 50 MG/1
CAPSULE ORAL 3 TIMES DAILY
COMMUNITY
End: 2020-10-22 | Stop reason: ALTCHOICE

## 2020-08-17 RX ORDER — ONDANSETRON 4 MG/1
4 TABLET, ORALLY DISINTEGRATING ORAL
COMMUNITY
End: 2020-10-22 | Stop reason: ALTCHOICE

## 2020-08-17 RX ORDER — DUREZOL 0.5 MG/ML
1 EMULSION OPHTHALMIC
COMMUNITY
End: 2020-10-22 | Stop reason: ALTCHOICE

## 2020-08-17 RX ORDER — ZOSTER VACCINE LIVE 19400 [PFU]/.65ML
0.65 INJECTION, POWDER, LYOPHILIZED, FOR SUSPENSION SUBCUTANEOUS ONCE
COMMUNITY
End: 2020-12-02 | Stop reason: ALTCHOICE

## 2020-08-17 RX ORDER — POLYETHYLENE GLYCOL 3350, SODIUM SULFATE ANHYDROUS, SODIUM BICARBONATE, SODIUM CHLORIDE, POTASSIUM CHLORIDE 236; 22.74; 6.74; 5.86; 2.97 G/4L; G/4L; G/4L; G/4L; G/4L
POWDER, FOR SOLUTION ORAL
COMMUNITY
End: 2020-10-22 | Stop reason: ALTCHOICE

## 2020-08-17 RX ORDER — SPIRONOLACTONE 25 MG/1
TABLET ORAL DAILY
COMMUNITY
End: 2020-10-22 | Stop reason: ALTCHOICE

## 2020-08-17 RX ORDER — OFLOXACIN 3 MG/ML
5 SOLUTION AURICULAR (OTIC) DAILY
COMMUNITY
End: 2020-10-22 | Stop reason: ALTCHOICE

## 2020-08-21 LAB — SPECIMEN STATUS REPORT, ROLRST: NORMAL

## 2020-08-22 DIAGNOSIS — E05.90 HYPERTHYROIDISM: ICD-10-CM

## 2020-08-22 DIAGNOSIS — F43.21 ADJUSTMENT DISORDER WITH DEPRESSED MOOD: Primary | ICD-10-CM

## 2020-08-22 RX ORDER — ALLOPURINOL 100 MG/1
TABLET ORAL
Qty: 180 TAB | Refills: 0 | Status: SHIPPED | OUTPATIENT
Start: 2020-08-22 | End: 2020-12-02

## 2020-08-22 RX ORDER — FLUOXETINE HYDROCHLORIDE 20 MG/1
CAPSULE ORAL
Qty: 90 CAP | Refills: 0 | Status: SHIPPED | OUTPATIENT
Start: 2020-08-22 | End: 2020-10-22 | Stop reason: SDUPTHER

## 2020-08-22 RX ORDER — ALPRAZOLAM 0.5 MG/1
TABLET ORAL
Qty: 60 TAB | Refills: 0 | Status: SHIPPED | OUTPATIENT
Start: 2020-08-22 | End: 2020-10-05

## 2020-09-08 ENCOUNTER — TELEPHONE (OUTPATIENT)
Dept: ENDOCRINOLOGY | Age: 75
End: 2020-09-08

## 2020-09-08 DIAGNOSIS — E11.65 TYPE 2 DIABETES MELLITUS WITH HYPERGLYCEMIA, UNSPECIFIED WHETHER LONG TERM INSULIN USE (HCC): Primary | ICD-10-CM

## 2020-09-08 NOTE — TELEPHONE ENCOUNTER
Patient called saying she has been trying for three weeks to get in touch with her PCP, Dr. Breana Daly, with no luck to refill meds until Dr. Sarmad Reid comes back. She is out of her Tradjenta.

## 2020-09-09 ENCOUNTER — TELEPHONE (OUTPATIENT)
Dept: PRIMARY CARE CLINIC | Age: 75
End: 2020-09-09

## 2020-09-09 NOTE — TELEPHONE ENCOUNTER
Dr. Addie Lockett, can you please fill this. Looks like she is on Tradjenta 5mg from [place]. She is having trouble getting it filled with her pcp and she is out.

## 2020-09-10 RX ORDER — LINAGLIPTIN 5 MG/1
5 TABLET, FILM COATED ORAL DAILY
Qty: 30 TAB | Refills: 4 | Status: SHIPPED | OUTPATIENT
Start: 2020-09-10 | End: 2020-10-10

## 2020-09-28 DIAGNOSIS — F43.21 ADJUSTMENT DISORDER WITH DEPRESSED MOOD: ICD-10-CM

## 2020-09-29 ENCOUNTER — TELEPHONE (OUTPATIENT)
Dept: ENDOCRINOLOGY | Age: 75
End: 2020-09-29

## 2020-09-29 DIAGNOSIS — E78.2 MIXED HYPERLIPIDEMIA: Primary | ICD-10-CM

## 2020-09-29 NOTE — TELEPHONE ENCOUNTER
Patient is calling to see if she needs labs done. Rescheduled patient to see you on November 4th @ 1:00pm. Patient is seeing Dr. Rodrigo Hackett for her thyroid and seeing you for diabetesl. She is seeing 2 endocrinologists at the same time. What would you recommend she do?

## 2020-10-01 DIAGNOSIS — E04.9 NODULAR GOITER: ICD-10-CM

## 2020-10-02 NOTE — TELEPHONE ENCOUNTER
She is due for lipid profile. I am ordering it. Please check if patient wants to pick it up or mail it to her. Ok to get it done 1 week before appointment.

## 2020-10-05 RX ORDER — ALPRAZOLAM 0.5 MG/1
TABLET ORAL
Qty: 60 TAB | Refills: 0 | Status: SHIPPED | OUTPATIENT
Start: 2020-10-05 | End: 2020-10-22 | Stop reason: SDUPTHER

## 2020-10-05 RX ORDER — LETROZOLE 2.5 MG/1
TABLET, FILM COATED ORAL
Qty: 90 TAB | Refills: 0 | Status: SHIPPED | OUTPATIENT
Start: 2020-10-05 | End: 2020-12-26

## 2020-10-14 DIAGNOSIS — E05.90 HYPERTHYROIDISM: ICD-10-CM

## 2020-10-14 RX ORDER — METHIMAZOLE 5 MG/1
5 TABLET ORAL DAILY
Qty: 90 TAB | Refills: 5 | Status: SHIPPED | OUTPATIENT
Start: 2020-10-14 | End: 2021-01-07 | Stop reason: SDUPTHER

## 2020-10-22 ENCOUNTER — OFFICE VISIT (OUTPATIENT)
Dept: PRIMARY CARE CLINIC | Age: 75
End: 2020-10-22
Payer: MEDICARE

## 2020-10-22 VITALS
WEIGHT: 189.13 LBS | OXYGEN SATURATION: 100 % | DIASTOLIC BLOOD PRESSURE: 70 MMHG | TEMPERATURE: 97.2 F | HEIGHT: 64 IN | HEART RATE: 70 BPM | SYSTOLIC BLOOD PRESSURE: 119 MMHG | BODY MASS INDEX: 32.29 KG/M2 | RESPIRATION RATE: 16 BRPM

## 2020-10-22 DIAGNOSIS — I25.10 CORONARY ARTERIOSCLEROSIS: ICD-10-CM

## 2020-10-22 DIAGNOSIS — I10 ESSENTIAL HYPERTENSION: ICD-10-CM

## 2020-10-22 DIAGNOSIS — N18.30 STAGE 3 CHRONIC KIDNEY DISEASE, UNSPECIFIED WHETHER STAGE 3A OR 3B CKD (HCC): ICD-10-CM

## 2020-10-22 DIAGNOSIS — E05.90 HYPERTHYROIDISM: ICD-10-CM

## 2020-10-22 DIAGNOSIS — F41.9 ANXIETY DISORDER, UNSPECIFIED TYPE: ICD-10-CM

## 2020-10-22 DIAGNOSIS — E11.65 TYPE 2 DIABETES MELLITUS WITH HYPERGLYCEMIA, WITHOUT LONG-TERM CURRENT USE OF INSULIN (HCC): ICD-10-CM

## 2020-10-22 DIAGNOSIS — E11.69 MIXED HYPERLIPIDEMIA DUE TO TYPE 2 DIABETES MELLITUS (HCC): ICD-10-CM

## 2020-10-22 DIAGNOSIS — R06.02 SHORTNESS OF BREATH: ICD-10-CM

## 2020-10-22 DIAGNOSIS — Z23 ENCOUNTER FOR IMMUNIZATION: Primary | ICD-10-CM

## 2020-10-22 DIAGNOSIS — F43.21 ADJUSTMENT DISORDER WITH DEPRESSED MOOD: ICD-10-CM

## 2020-10-22 DIAGNOSIS — E78.2 MIXED HYPERLIPIDEMIA DUE TO TYPE 2 DIABETES MELLITUS (HCC): ICD-10-CM

## 2020-10-22 LAB
CHOLEST SERPL-MCNC: 136 MG/DL (ref 100–199)
HDLC SERPL-MCNC: 27 MG/DL
LDLC SERPL CALC-MCNC: 65 MG/DL (ref 0–99)
T4 FREE SERPL-MCNC: 0.86 NG/DL (ref 0.82–1.77)
TRIGL SERPL-MCNC: 273 MG/DL (ref 0–149)
TSH SERPL DL<=0.005 MIU/L-ACNC: 0.79 UIU/ML (ref 0.45–4.5)
VLDLC SERPL CALC-MCNC: 44 MG/DL (ref 5–40)

## 2020-10-22 PROCEDURE — 99214 OFFICE O/P EST MOD 30 MIN: CPT | Performed by: FAMILY MEDICINE

## 2020-10-22 PROCEDURE — 90756 CCIIV4 VACC ABX FREE IM: CPT | Performed by: FAMILY MEDICINE

## 2020-10-22 PROCEDURE — G0008 ADMIN INFLUENZA VIRUS VAC: HCPCS | Performed by: FAMILY MEDICINE

## 2020-10-22 RX ORDER — ALBUTEROL SULFATE 90 UG/1
2 AEROSOL, METERED RESPIRATORY (INHALATION)
Qty: 1 INHALER | Refills: 3 | Status: SHIPPED | OUTPATIENT
Start: 2020-10-22

## 2020-10-22 RX ORDER — BLOOD SUGAR DIAGNOSTIC
STRIP MISCELLANEOUS
COMMUNITY
End: 2020-10-22 | Stop reason: ALTCHOICE

## 2020-10-22 RX ORDER — AMLODIPINE BESYLATE 5 MG/1
5 TABLET ORAL DAILY
Qty: 90 TAB | Refills: 1 | Status: SHIPPED | OUTPATIENT
Start: 2020-10-22

## 2020-10-22 RX ORDER — ALPRAZOLAM 0.5 MG/1
0.5 TABLET ORAL
Qty: 60 TAB | Refills: 2 | Status: SHIPPED | OUTPATIENT
Start: 2020-10-22 | End: 2021-03-01

## 2020-10-22 RX ORDER — LANCETS 33 GAUGE
EACH MISCELLANEOUS
COMMUNITY
End: 2020-12-26

## 2020-10-22 RX ORDER — CITALOPRAM 20 MG/1
TABLET, FILM COATED ORAL
COMMUNITY
End: 2020-10-22 | Stop reason: ALTCHOICE

## 2020-10-22 RX ORDER — FLUOXETINE HYDROCHLORIDE 20 MG/1
CAPSULE ORAL
Qty: 90 CAP | Refills: 1 | Status: SHIPPED | OUTPATIENT
Start: 2020-10-22 | End: 2020-12-02 | Stop reason: ALTCHOICE

## 2020-10-22 RX ORDER — ATROPINE SULFATE 10 MG/ML
SOLUTION/ DROPS OPHTHALMIC
COMMUNITY
End: 2020-10-22 | Stop reason: ALTCHOICE

## 2020-10-22 RX ORDER — FLUTICASONE PROPIONATE 50 MCG
SPRAY, SUSPENSION (ML) NASAL
COMMUNITY
End: 2020-10-22 | Stop reason: ALTCHOICE

## 2020-10-22 RX ORDER — TRAZODONE HYDROCHLORIDE 150 MG/1
TABLET ORAL
COMMUNITY
End: 2020-10-22 | Stop reason: ALTCHOICE

## 2020-10-22 RX ORDER — LINAGLIPTIN 5 MG/1
TABLET, FILM COATED ORAL
COMMUNITY
End: 2021-02-08

## 2020-10-22 NOTE — PATIENT INSTRUCTIONS
Nutrition Tips for Diabetes: After Your Visit Your Care Instructions A healthy diet is important to manage diabetes. It helps you lose weight (if you need to) and keep it off. It gives you the nutrition and energy your body needs and helps prevent heart disease. But a diet for diabetes does not mean that you have to eat special foods. You can eat what your family eats, including occasional sweets and other favorites. But you do have to pay attention to how often you eat and how much you eat of certain foods. The right plan for you will give you meals that help you keep your blood sugar at healthy levels. Try to eat a variety of foods and to spread carbohydrate throughout the day. Carbohydrate raises blood sugar higher and more quickly than any other nutrient does. Carbohydrate is found in sugar, breads and cereals, fruit, starchy vegetables such as potatoes and corn, and milk and yogurt. You may want to work with a dietitian or diabetes educator to help you plan meals and snacks. A dietitian or diabetes educator also can help you lose weight if that is one of your goals. The following tips can help you enjoy your meals and stay healthy. Follow-up care is a key part of your treatment and safety. Be sure to make and go to all appointments, and call your doctor if you are having problems. Its also a good idea to know your test results and keep a list of the medicines you take. How can you care for yourself at home? · Learn which foods have carbohydrate and how much carbohydrate to eat. A dietitian or diabetes educator can help you learn to keep track of how much carbohydrate you eat. · Spread carbohydrate throughout the day. Eat some carbohydrate at all meals, but do not eat too much at any one time. · Plan meals to include food from all the food groups. These are the food groups and some example portion sizes: ¨ Grains: 1 slice of bread (1 ounce), ½ cup of cooked cereal, and 1/3 cup of cooked pasta or rice. These have about 15 grams of carbohydrate in a serving. Choose whole grains such as whole wheat bread or crackers, oatmeal, and brown rice more often than refined grains. ¨ Fruit: 1 small fresh fruit, such as an apple or orange; ½ of a banana; ½ cup of chopped, cooked, or canned fruit; ½ cup of fruit juice; 1 cup of melon or raspberries; and 2 tablespoons of dried fruit. These have about 15 grams of carbohydrate in a serving. ¨ Dairy: 1 cup of nonfat or low-fat milk and 2/3 cup of plain yogurt. These have about 15 grams of carbohydrate in a serving. ¨ Protein foods: Beef, chicken, turkey, fish, eggs, tofu, cheese, cottage cheese, and peanut butter. A serving size of meat is 3 ounces, which is about the size of a deck of cards. Examples of meat substitute serving sizes (equal to 1 ounce of meat) are 1/4 cup of cottage cheese, 1 egg, 1 tablespoon of peanut butter, and ½ cup of tofu. These have very little or no carbohydrate per serving. ¨ Vegetables: Starchy vegetables such as ½ cup of cooked dried beans, peas, potatoes, or corn have about 15 grams of carbohydrate. Nonstarchy vegetables have very little carbohydrate, such as 1 cup of raw leafy vegetables (such as spinach), ½ cup of other vegetables (cooked or chopped), and 3/4 cup of vegetable juice. · Use the plate format to plan meals. It is a good, quick way to make sure that you have a balanced meal. It also helps you spread carbohydrate throughout the day. You divide your plate by types of foods. Put vegetables on half the plate, meat or meat substitutes on one-quarter of the plate, and a grain or starchy vegetable (such as brown rice or a potato) in the final quarter of the plate.  To this you can add a small piece of fruit and 1 cup of milk or yogurt, depending on how much carbohydrate you are supposed to eat at a meal. 
· Talk to your dietitian or diabetes educator about ways to add limited amounts of sweets into your meal plan. You can eat these foods now and then, as long as you include the amount of carbohydrate they have in your daily carbohydrate allowance. · If you drink alcohol, limit it to no more than 1 drink a day for women and 2 drinks a day for men. If you are pregnant, no amount of alcohol is known to be safe. · Protein, fat, and fiber do not raise blood sugar as much as carbohydrate does. If you eat a lot of these nutrients in a meal, your blood sugar will rise more slowly than it would otherwise. · Limit saturated fats, such as those from meat and dairy products. Try to replace it with monounsaturated fat, such as olive oil. This is a healthier choice because people who have diabetes are at higher-than-average risk of heart disease. But use a modest amount of olive oil. A tablespoon of olive oil has 14 grams of fat and 120 calories. · Exercise lowers blood sugar. If you take insulin by shots or pump, you can use less than you would if you were not exercising. Keep in mind that timing matters. If you exercise within 1 hour after a meal, your body may need less insulin for that meal than it would if you exercised 3 hours after the meal. Test your blood sugar to find out how exercise affects your need for insulin. · Exercise on most days of the week. Aim for at least 30 minutes. Exercise helps you stay at a healthy weight and helps your body use insulin. Walking is an easy way to get exercise. Gradually increase the amount you walk every day. You also may want to swim, bike, or do other activities. When you eat out · Learn to estimate the serving sizes of foods that have carbohydrate. If you measure food at home, it will be easier to estimate the amount in a serving of restaurant food. · If the meal you order has too much carbohydrate (such as potatoes, corn, or baked beans), ask to have a low-carbohydrate food instead. Ask for a salad or green vegetables. · If you use insulin, check your blood sugar before and after eating out to help you plan how much to eat in the future. · If you eat more carbohydrate at a meal than you had planned, take a walk or do other exercise. This will help lower your blood sugar. Where can you learn more? Go to [employer]TheCr[employer]com.be Enter I758 in the search box to learn more about \"Nutrition Tips for Diabetes: After Your Visit. \"  
© 3473-1574 [employer]. Care instructions adapted under license by [facility] (which disclaims liability or warranty for this information). This care instruction is for use with your licensed healthcare professional. If you have questions about a medical condition or this instruction, always ask your healthcare professional. [address] any warranty or liability for your use of this information. Content Version: 91.2.776879; Current as of: June 4, 2014 Diabetes and Preventing Falls: Care Instructions Your Care Instructions If you are an older adult who has diabetes, you may have a higher risk of falling. Complications of diabetessuch as nerve damage, foot problems, and reduced visionmay increase your risk of a fall. Some of your medicines also may add to your risk. By making your home safer, you can lower your risk of falling. Doing things to prevent diabetes complications may also help to lower your risk. You can make your home safer with a few simple measures. Follow-up care is a key part of your treatment and safety. Be sure to make and go to all appointments, and call your doctor if you are having problems. It's also a good idea to know your test results and keep a list of the medicines you take. How can you care for yourself at home? Taking care of yourself · Keep your blood sugar at a target level (which you set with your doctor). · Exercise regularly to improve your strength, muscle tone, and balance. Walk if you can. Swimming may be a good choice if you cannot walk easily. · Have your vision checked as often as your doctor recommends. It is usually once a year or more often if you have eye problems. · Know the side effects of the medicines you take. Ask your doctor or pharmacist whether the medicines you take can affect your balance. Sleeping pills or sedatives can affect your balance. · Limit the amount of alcohol you drink. Alcohol can impair your balance and other senses. · Have your doctor check your feet during each visit. If you have a foot problem, see your doctor. Preventing falls at home · Remove raised doorway thresholds, throw rugs, and clutter. Repair loose carpet or raised areas in the floor. · Move furniture and electrical cords to keep them out of walking paths. · Use nonskid floor wax, and wipe up spills right away, especially on ceramic tile floors. · If you use a walker or cane, put rubber tips on it. If you use crutches, clean the bottoms of them regularly with an abrasive pad, such as steel wool. · Keep your house well lit, especially [place], and outside walkways. Use night-lights in areas such as hallways and bathrooms. Add extra light switches or use remote switches (such as switches that go on or off when you clap your hands) to make it easier to turn lights on if you have to get up during the night. · Install sturdy handrails on stairways. Put grab bars near your shower, bathtub, and toilet. · Store household items on low shelves so that you do not have to climb or reach high. Or use a reaching device that you can get at a medical supply store. If you have to climb for something, use a step stool with handrails, or ask someone to get it for you. · Keep a cordless phone and a flashlight with new batteries by your bed. If possible, put a phone in each of the main rooms of your house, or carry a cell phone in case you fall and cannot reach a phone.  Or you can wear a device around your neck or wrist. You push a button that sends a signal for help. · Wear low-heeled shoes that fit well and give your feet good support. Use footwear with nonskid soles. Check the heels and soles of your shoes for wear. Repair or replace worn heels or soles. · Do not wear socks without shoes on wood floors. · Walk on the grass when the sidewalks are slippery. If you live in an area that gets snow and ice in the winter, sprinkle salt on slippery steps and sidewalks. Where can you learn more? Go to http://www.gray.com/ Enter Q396 in the search box to learn more about \"Diabetes and Preventing Falls: Care Instructions. \" Current as of: April 15, 2020               Content Version: 12.6 © 3384-7801 [employer]. Care instructions adapted under license by [employer] (which disclaims liability or warranty for this information). If you have questions about a medical condition or this instruction, always ask your healthcare professional. [place] [address] any warranty or liability for your use of this information. Learning About Meal Planning for Diabetes Why plan your meals? Meal planning can be a key part of managing diabetes. Planning meals and snacks with the right balance of carbohydrate, protein, and fat can help you keep your blood sugar at the target level you set with your doctor. You don't have to eat special foods. You can eat what your family eats, including sweets once in a while. But you do have to pay attention to how often you eat and how much you eat of certain foods. You may want to work with a dietitian or a certified diabetes educator. He or she can give you tips and meal ideas and can answer your questions about meal planning. This health professional can also help you reach a healthy weight if that is one of your goals. What plan is right for you? Your dietitian or diabetes educator may suggest that you start with the plate format or carbohydrate counting. The plate format The plate format is a simple way to help you manage how you eat. You plan meals by learning how much space each food should take on a plate. Using the plate format helps you spread carbohydrate throughout the day. It can make it easier to keep your blood sugar level within your target range. It also helps you see if you're eating healthy portion sizes. To use the plate format, you put non-starchy vegetables on half your plate. Add meat or meat substitutes on one-quarter of the plate. Put a grain or starchy vegetable (such as brown rice or a potato) on the final quarter of the plate. You can add a small piece of fruit and some low-fat or fat-free milk or yogurt, depending on your carbohydrate goal for each meal. 
Here are some tips for using the plate format: · Make sure that you are not using an oversized plate. A 9-inch plate is best. Many restaurants use larger plates. · Get used to using the plate format at home. Then you can use it when you eat out. · Write down your questions about using the plate format. Talk to your doctor, a dietitian, or a diabetes educator about your concerns. Carbohydrate counting With carbohydrate counting, you plan meals based on the amount of carbohydrate in each food. Carbohydrate raises blood sugar higher and more quickly than any other nutrient. It is found in desserts, breads and cereals, and fruit. It's also found in starchy vegetables such as potatoes and corn, grains such as rice and pasta, and milk and yogurt. Spreading carbohydrate throughout the day helps keep your blood sugar levels within your target range. Your daily amount depends on several things, including your weight, how active you are, which diabetes medicines you take, and what your goals are for your blood sugar levels.  A registered dietitian or diabetes educator can help you plan how much carbohydrate to include in each meal and snack. A guideline for your daily amount of carbohydrate is: · 45 to 60 grams at each meal. That's about the same as 3 to 4 carbohydrate servings. · 15 to 20 grams at each snack. That's about the same as 1 carbohydrate serving. The Nutrition Facts label on packaged foods tells you how much carbohydrate is in a serving of the food. First, look at the serving size on the food label. Is that the amount you eat in a serving? All of the nutrition information on a food label is based on that serving size. So if you eat more or less than that, you'll need to adjust the other numbers. Total carbohydrate is the next thing you need to look for on the label. If you count carbohydrate servings, one serving of carbohydrate is 15 grams. For foods that don't come with labels, such as fresh fruits and vegetables, you'll need a guide that lists carbohydrate in these foods. Ask your doctor, dietitian, or diabetes educator about books or other nutrition guides you can use. If you take insulin, you need to know how many grams of carbohydrate are in a meal. This lets you know how much rapid-acting insulin to take before you eat. If you use an insulin pump, you get a constant rate of insulin during the day. So the pump must be programmed at meals to give you extra insulin to cover the rise in blood sugar after meals. When you know how much carbohydrate you will eat, you can take the right amount of insulin. Or, if you always use the same amount of insulin, you need to make sure that you eat the same amount of carbohydrate at meals. If you need more help to understand carbohydrate counting and food labels, ask your doctor, dietitian, or diabetes educator. How do you get started with meal planning? Here are some tips to get started: 
· Plan your meals a week at a time. Don't forget to include snacks too. · Use cookbooks or online recipes to plan several main meals. Plan some quick meals for busy nights. You also can double some recipes that freeze well. Then you can save half for other busy nights when you don't have time to cook. · Make sure you have the ingredients you need for your recipes. If you're running low on basic items, put these items on your shopping list too. · List foods that you use to make breakfasts, lunches, and snacks. List plenty of fruits and vegetables. · Post this list on the refrigerator. Add to it as you think of more things you need. · Take the list to the store to do your weekly shopping. Follow-up care is a key part of your treatment and safety. Be sure to make and go to all appointments, and call your doctor if you are having problems. It's also a good idea to know your test results and keep a list of the medicines you take. Where can you learn more? Go to http://www.Camero/ Aisha Tapia in the search box to learn more about \"Learning About Meal Planning for Diabetes. \" Current as of: December 20, 2019               Content Version: 12.6 © 8966-5420 [employer]. Care instructions adapted under license by [employer] (which disclaims liability or warranty for this information). If you have questions about a medical condition or this instruction, always ask your healthcare professional. [place] [address] any warranty or liability for your use of this information. Type 2 Diabetes: Care Instructions Your Care Instructions Type 2 diabetes is a disease that develops when the body's tissues cannot use insulin properly. Over time, the pancreas cannot make enough insulin. Insulin is a hormone that helps the body's cells use sugar (glucose) for energy. It also helps the body store extra sugar in muscle, fat, and liver cells. Without insulin, the sugar cannot get into the cells to do its work.  It stays in the blood instead. This can cause high blood sugar levels. A person has diabetes when the blood sugar stays too high too much of the time. Over time, diabetes can lead to diseases of the heart, blood vessels, nerves, kidneys, and eyes. You may be able to control your blood sugar by losing weight, eating a healthy diet, and getting daily exercise. You may also have to take insulin or other diabetes medicine. Follow-up care is a key part of your treatment and safety. Be sure to make and go to all appointments. Call your doctor if you are having problems. It's also a good idea to know your test results and keep a list of the medicines you take. How can you care for yourself at home? · Keep your blood sugar at a target level (which you set with your doctor). ? Eat a good diet that spreads carbohydrate throughout the day. Carbohydratethe body's main source of fuelaffects blood sugar more than any other nutrient. Carbohydrate is in fruits, vegetables, milk, and yogurt. It also is in breads, cereals, vegetables such as potatoes and corn, and sugary foods such as candy and cakes. ? Aim for 30 minutes of exercise on most, preferably all, days of the week. Walking is a good choice. You also may want to do other activities, such as running, swimming, cycling, or playing tennis or team sports. If your doctor says it's okay, do muscle-strengthening exercises at least 2 times a week. ? Take your medicines exactly as prescribed. Call your doctor if you think you are having a problem with your medicine. You will get more details on the specific medicines your doctor prescribes. · Check your blood sugar as often as your doctor recommends. It is important to keep track of any symptoms you have, such as low blood sugar. Also tell your doctor if you have any changes in your activities, diet, or insulin use. · Talk to your doctor before you start taking aspirin every day.  Aspirin can help certain people lower their risk of a heart attack or stroke. But taking aspirin isn't right for everyone, because it can cause serious bleeding. · Do not smoke. If you need help quitting, talk to your doctor about stop-smoking programs and medicines. These can increase your chances of quitting for good. · Keep your cholesterol and blood pressure at normal levels. You may need to take one or more medicines to reach your goals. Take them exactly as directed. Do not stop or change a medicine without talking to your doctor first. 
When should you call for help? Call 911 anytime you think you may need emergency care. For example, call if: 
  · You passed out (lost consciousness), or you suddenly become very sleepy or confused. (You may have very low blood sugar.) Call your doctor now or seek immediate medical care if: 
  · Your blood sugar is 300 mg/dL or is higher than the level your doctor has set for you.  
  · You have symptoms of low blood sugar, such as: ? Sweating. ? Feeling nervous, shaky, and weak. ? Extreme hunger and slight nausea. ? Dizziness and headache. 
? Blurred vision. ? Confusion. Watch closely for changes in your health, and be sure to contact your doctor if: 
  · You often have problems controlling your blood sugar.  
  · You have symptoms of long-term diabetes problems, such as: ? New vision changes. ? New pain, numbness, or tingling in your hands or feet. ? Skin problems. Where can you learn more? Go to http://www.gray.com/ Enter C553 in the search box to learn more about \"Type 2 Diabetes: Care Instructions. \" Current as of: December 20, 2019               Content Version: 12.6 © 1297-2170 [employer]. Care instructions adapted under license by [employer] (which disclaims liability or warranty for this information).  If you have questions about a medical condition or this instruction, always ask your healthcare professional. [address] any warranty or liability for your use of this information.

## 2020-10-22 NOTE — PROGRESS NOTES
Josie Aviles is a 76 y.o. female who presents today for the following:  Chief Complaint   Patient presents with    Hypertension    Diabetes    Osteoarthritis     Pt. is requesting an electric wheelchair/scooter for ambulation. Lives at [facility].  Labs   ,     ICD-10-CM ICD-9-CM    1. Encounter for immunization  Z23 V03.89 INFLUENZA VACCINE (CCIIV4 VACCINE ANTIBIO FREE 0.5 ML)   2. Hyperthyroidism  E05.90 242.90 amLODIPine (NORVASC) 5 mg tablet   3. Essential hypertension  I10 401.9    4. Anxiety disorder, unspecified type  F41.9 300.00 FLUoxetine (PROzac) 20 mg capsule   5. Shortness of breath  R06.02 786.05 albuterol (Ventolin HFA) 90 mcg/actuation inhaler   6. Coronary arteriosclerosis  I25.10 414.00    7. Type 2 diabetes mellitus with hyperglycemia, without long-term current use of insulin (HCC)  E11.65 250.00      790.29    8. Mixed hyperlipidemia due to type 2 diabetes mellitus (HCC)  E11.69 250.80     E78.2 272.2    9. Stage 3 chronic kidney disease, unspecified whether stage 3a or 3b CKD  N18.30 585.3    10. Adjustment disorder with depressed mood  F43.21 309.0     . Patient comes in for follow-up of her hyper thyroid disease, hypertension, anxiety disorder, type 2 diabetes, hyperlipidemia, stage III chronic kidney disease, osteoarthritis. She has multiple complaints and we reviewed her medications and the majority of them were removed. She is seeing Dr. Jenana Ware for her diabetes thyroid problems and has an appointment the first week in November. She also seen a cardiologist for her cardiac problems. She requests a prescription for a motorized scooter so that she can go outside and be more mobile as she has chronic pain. She currently is using a cane and indicates she can get around in her house and do her ADLs with a cane. She does feel she would use a walker and would be unable to use a manual wheelchair she has chronic arthritis in her shoulders.   She recently saw Dr. Elan Rosenberg and had a injection into her joints. But she still has pain. Allergies   Allergen Reactions    Iodinated Contrast Media Anaphylaxis    Codeine Itching       Current Outpatient Medications   Medication Sig    methIMAzole (TAPAZOLE) 5 mg tablet Take 1 Tab by mouth daily. Taking one tablet daily    ALPRAZolam (XANAX) 0.5 mg tablet Take 1 tablet by mouth twice daily as needed for anxiety    letrozole (FEMARA) 2.5 mg tablet Take 1 tablet by mouth once daily    FLUoxetine (PROzac) 20 mg capsule Take 1 capsule by mouth once daily    allopurinoL (ZYLOPRIM) 100 mg tablet Take 2 tablets by mouth once daily    glipiZIDE (GLUCOTROL) 5 mg tablet Take  by mouth two (2) times a day.  cholecalciferol, vitamin D3, (Vitamin D3) 50 mcg (2,000 unit) tab Take  by mouth.  diclofenac (VOLTAREN) 1 % gel Apply 2 g to affected area four (4) times daily.  OneTouch Verio Flex meter misc USE TO CHECK BLOOD SUGAR TWICE A DAY    OneTouch Verio test strips strip USE WITH BLOOD GLUCOSE METER TO CHECK BLOOD SUGAR TWICE A DAY    metoprolol succinate (TOPROL-XL) 25 mg XL tablet TAKE 1 2 (ONE HALF) TABLET BY MOUTH ONCE DAILY    amLODIPine (NORVASC) 5 mg tablet Take 5 mg by mouth daily.  omeprazole (PRILOSEC) 40 mg capsule Take 40 mg by mouth daily.  multivit-minerals/folic acid (ADULT MULTIVITAMIN GUMMIES PO) Take 2 Tabs by mouth daily.  azelastine (ASTEPRO) 0.15 % (205.5 mcg) two (2) times a day.  albuterol (VENTOLIN HFA) 90 mcg/actuation inhaler Take  by inhalation.  losartan (COZAAR) 100 mg tablet Take 100 mg by mouth daily.  aspirin delayed-release 81 mg tablet Take 81 mg by mouth daily.  COLCRYS 0.6 mg tablet     atorvastatin (LIPITOR) 40 mg tablet Take 40 mg by mouth daily.     lancets (OneTouch Delica Plus Lancet) 33 gauge misc OneTouch Delica Plus Lancet 33 gauge   USE 1 TO CHECK GLUCOSE TWICE DAILY    linaGLIPtin (Tradjenta) 5 mg tablet Tradjenta 5 mg tablet   TAKE 1 TABLET BY MOUTH ONCE DAILY IN THE MORNING FOR 30 DAYS    NITROFURANTOIN MONOHYD/M-CRYST PO Take  by mouth.  varicella zoster vaccine live (VARICELLA-ZOSTER VACINE LIVE) 19,400 unit/0.65 mL susr injection 0.65 mL by SubCUTAneous route once.  valsartan (DIOVAN) 80 mg tablet Take  by mouth daily.  turmeric root extract 500 mg cap Take 500 mg by mouth daily.  vitamin E (AQUA GEMS) 400 unit capsule Take 400 Units by mouth daily.  peg 400-propylene glycol (SYSTANE, PROPYLENE GLYCOL,) 0.4-0.3 % drop as needed. No current facility-administered medications for this visit.         Past Medical History:   Diagnosis Date    Adjustment disorder with depressed mood 4/10/2017    Adrenal adenoma 4/10/2017    Anxiety disorder 4/10/2017    Arthritis     Atherosclerosis     Backache 7/31/2020    Benign essential hypertension 7/31/2020    Breast cancer ([facility] [address]     Carcinoma in situ of breast 7/31/2020    Cataract 4/10/2017    Chronic kidney disease, stage 3 (moderate) 9/5/2019    Coronary arteriosclerosis 4/10/2017    Dehydration 7/31/2020    Depressive disorder 4/10/2017    Dyslipidemia 4/10/2017    Dyspnea 7/31/2020    Gastroesophageal reflux disease 4/10/2017    Gout     Heart disease     Heartburn     Hirsutism 4/10/2017    History of malignant neoplasm of breast 7/31/2020    HTN (hypertension)     Hyperkalemia 4/10/2017    Hyperlipidemia     Hypertensive disorder 7/31/2020    Hypertensive renal disease 7/31/2020    Hyperthyroidism     Insomnia 4/10/2017    Kidney disease     Lesion of skin of face 4/10/2017    Mixed hyperlipidemia 9/5/2019    Mixed hyperlipidemia due to type 2 diabetes mellitus ([address] 7/31/2020    Subcutaneous mass of neck 3/25/2019    Thyroid disease     Thyroid nodule 9/5/2019    Tobacco user 4/10/2017    Type 2 diabetes mellitus with hyperglycemia ([address] 9/5/2019    Varicose vein of leg     Vitamin D deficiency 4/10/2017       Past Surgical History:   Procedure Laterality Date    BREAST SURGERY PROCEDURE UNLISTED Right 01/20/2014    Drainage of fluid    BREAST SURGERY PROCEDURE UNLISTED  04/03/2013    BIOPSY BREAST PERCUT W/IMAGE    BREAST SURGERY PROCEDURE UNLISTED  07/26/2012    PLACE BREAST CLIP PERCUT    BREAST SURGERY PROCEDURE UNLISTED  07/26/2012    BIOPSY BREAST PERCUT W/DEVICE    BREAST SURGERY PROCEDURE UNLISTED  08/18/2011    BIOPSY BREAST PERCUT W/IMAGE    BREAST SURGERY PROCEDURE UNLISTED  08/11/2011    PLACE BREAST CLIP PERCUT    BREAST SURGERY PROCEDURE UNLISTED  08/11/2011    BIOPSY BREAST PERCUT W/DEVICE    HX BREAST BIOPSY  08/18/2011    BIOPSY/REMOVAL LYMPH NODES    HX CATARACT REMOVAL  11/22/2016    HX OTHER SURGICAL  01/01/2004    Neck surgery    HX PARTIAL HYSTERECTOMY  01/01/1980    HX TONSILLECTOMY  01/01/1950       Family History   Problem Relation Age of Onset    Gall Bladder Disease Mother     Diabetes Sister     Headache Sister     Hypertension Sister     Lung Disease Sister     Migraines Sister     Alcohol abuse Brother     Cancer Brother     Hypertension Brother     Alcohol abuse Maternal Aunt     Cancer Maternal Aunt     Diabetes Maternal Aunt     Alcohol abuse Maternal Uncle     Alcohol abuse Paternal Uncle     Diabetes Maternal Grandmother     Heart Disease Maternal Grandmother     Hypertension Maternal Grandmother     Asthma Child     Prostate Cancer Father        Social History     Socioeconomic History    Marital status:      Spouse name: Not on file    Number of children: Not on file    Years of education: Not on file    Highest education level: Not on file   Occupational History    Not on file   Social Needs    Financial resource strain: Not on file    Food insecurity     Worry: Not on file     Inability: Not on file    Transportation needs     Medical: Not on file     Non-medical: Not on file   Tobacco Use    Smoking status: Current Every Day Smoker     Packs/day: 0.50     Years: 60.00     Pack years: 30.00    Smokeless tobacco: Never Used    Tobacco comment: 1/2 pack per day. Substance and Sexual Activity    Alcohol use: No    Drug use: No    Sexual activity: Not on file   Lifestyle    Physical activity     Days per week: Not on file     Minutes per session: Not on file    Stress: Not on file   Relationships    Social connections     Talks on phone: Not on file     Gets together: Not on file     Attends Oriental orthodox service: Not on file     Active member of club or organization: Not on file     Attends meetings of clubs or organizations: Not on file     Relationship status: Not on file    Intimate partner violence     Fear of current or ex partner: Not on file     Emotionally abused: Not on file     Physically abused: Not on file     Forced sexual activity: Not on file   Other Topics Concern     Service Not Asked    Blood Transfusions Not Asked    Caffeine Concern Not Asked    Occupational Exposure Not Asked   Link [employer] Not Asked    Sleep Concern Not Asked    Stress Concern Not Asked    Weight Concern Not Asked    Special Diet Not Asked    Back Care Not Asked    Exercise Not Asked    Bike Helmet Not Asked   [address] Not Asked    Self-Exams Not Asked   Social History Narrative    Not on file         Review of Systems   Constitutional: Negative. Respiratory: Positive for shortness of breath and wheezing. Cardiovascular: Positive for chest pain (Sees cardiologist). Gastrointestinal: Positive for heartburn. Genitourinary: Negative. Musculoskeletal: Positive for back pain, joint pain (Hips, knees, ) and neck pain. Neurological: Positive for tingling. Endo/Heme/Allergies:        Checks sugars daily   Psychiatric/Behavioral: Positive for depression. The patient is nervous/anxious and has insomnia. All other systems reviewed and are negative.         Visit Vitals  /70 (BP 1 Location: Left arm, BP Patient Position: Sitting)   Pulse 70   Temp 97.2 °F (36.2 °C) (Skin) Resp 16   Ht 5' 4\" (1.626 m)   Wt 189 lb 2 oz (85.8 kg)   SpO2 100%   BMI 32.46 kg/m²       Physical Exam  Vitals signs and nursing note reviewed. Constitutional:       Appearance: Normal appearance. She is obese. Neck:      Musculoskeletal: Normal range of motion and neck supple. Cardiovascular:      Rate and Rhythm: Normal rate and regular rhythm. Pulses: Normal pulses. Heart sounds: Normal heart sounds. Pulmonary:      Effort: Pulmonary effort is normal.      Breath sounds: Normal breath sounds. Abdominal:      General: Abdomen is flat. Bowel sounds are normal.      Palpations: Abdomen is soft. Musculoskeletal:         General: Tenderness (Shoulders and knees) present. Skin:     General: Skin is warm and dry. Neurological:      General: No focal deficit present. Mental Status: She is alert. Psychiatric:         Mood and Affect: Mood normal.         Behavior: Behavior normal.         Thought Content: Thought content normal.         Judgment: Judgment normal.            ICD-10-CM ICD-9-CM    1. Encounter for immunization  Z23 V03.89 INFLUENZA VACCINE (CCIIV4 VACCINE ANTIBIO FREE 0.5 ML)   2. Hyperthyroidism  E05.90 242.90 amLODIPine (NORVASC) 5 mg tablet   3. Essential hypertension  I10 401.9    4. Anxiety disorder, unspecified type  F41.9 300.00 FLUoxetine (PROzac) 20 mg capsule   5. Shortness of breath  R06.02 786.05 albuterol (Ventolin HFA) 90 mcg/actuation inhaler   6. Coronary arteriosclerosis  I25.10 414.00    7. Type 2 diabetes mellitus with hyperglycemia, without long-term current use of insulin ([facility])  E11.65 250.00      790.29    8. Mixed hyperlipidemia due to type 2 diabetes mellitus ([facility])  E11.69 250.80     E78.2 272.2    9. Stage 3 chronic kidney disease, unspecified whether stage 3a or 3b CKD  N18.30 585.3    10. Adjustment disorder with depressed mood  F43.21 309.0         1.  Hyperthyroidism  Will be seen Dr. Allyn Almonte next month and will defer further treatment to her.  - amLODIPine (NORVASC) 5 mg tablet; Take 1 Tab by mouth daily. Dispense: 90 Tab; Refill: 1    2. Encounter for immunization    - INFLUENZA VACCINE (CCIIV4 VACCINE ANTIBIO FREE 0.5 ML)    3. Essential hypertension  Blood pressure is controlled on current medications    4. Anxiety disorder, unspecified type  She continues to feel anxious and somewhat depressed. She denies any suicidal thoughts.  - FLUoxetine (PROzac) 20 mg capsule; Take 1 capsule by mouth once daily  Indications: anxiousness associated with depression  Dispense: 90 Cap; Refill: 1    5. Shortness of breath    - albuterol (Ventolin HFA) 90 mcg/actuation inhaler; Take 2 Puffs by inhalation every four (4) hours as needed for Wheezing. Dispense: 1 Inhaler; Refill: 3    6. Coronary arteriosclerosis  She sees the cardiologist    7. Type 2 diabetes mellitus with hyperglycemia, without long-term current use of insulin (HCC)  Seen Dr. Deidra Wilson    8. Mixed hyperlipidemia due to type 2 diabetes mellitus (HCC)      9. Stage 3 chronic kidney disease, unspecified whether stage 3a or 3b CKD      10. Adjustment disorder with depressed mood       Several episodes of blood testing for various specialist.  I am unable to find the results for this and we will do a full set of blood tests at her next visit. If we can find the results of her most recent lab work.

## 2020-11-09 DIAGNOSIS — E78.2 MIXED HYPERLIPIDEMIA: ICD-10-CM

## 2020-11-19 ENCOUNTER — TELEPHONE (OUTPATIENT)
Dept: ENDOCRINOLOGY | Age: 75
End: 2020-11-19

## 2020-11-19 DIAGNOSIS — E11.65 TYPE 2 DIABETES MELLITUS WITH HYPERGLYCEMIA, WITHOUT LONG-TERM CURRENT USE OF INSULIN (HCC): Primary | ICD-10-CM

## 2020-11-20 RX ORDER — GLIPIZIDE 5 MG/1
5 TABLET ORAL 2 TIMES DAILY
Qty: 60 TAB | Refills: 0 | Status: SHIPPED | OUTPATIENT
Start: 2020-11-20 | End: 2020-12-16

## 2020-12-01 DIAGNOSIS — E05.90 HYPERTHYROIDISM: ICD-10-CM

## 2020-12-02 ENCOUNTER — OFFICE VISIT (OUTPATIENT)
Dept: ENDOCRINOLOGY | Age: 75
End: 2020-12-02
Payer: MEDICARE

## 2020-12-02 ENCOUNTER — TELEPHONE (OUTPATIENT)
Dept: ENDOCRINOLOGY | Age: 75
End: 2020-12-02

## 2020-12-02 VITALS
TEMPERATURE: 95.8 F | HEIGHT: 65 IN | SYSTOLIC BLOOD PRESSURE: 120 MMHG | DIASTOLIC BLOOD PRESSURE: 77 MMHG | OXYGEN SATURATION: 95 % | WEIGHT: 191.6 LBS | BODY MASS INDEX: 31.92 KG/M2 | HEART RATE: 77 BPM

## 2020-12-02 DIAGNOSIS — E11.65 TYPE 2 DIABETES MELLITUS WITH HYPERGLYCEMIA, WITHOUT LONG-TERM CURRENT USE OF INSULIN (HCC): Primary | ICD-10-CM

## 2020-12-02 DIAGNOSIS — E04.2 MULTINODULAR GOITER: ICD-10-CM

## 2020-12-02 LAB
GLUCOSE POC: 196 MG/DL
HBA1C MFR BLD HPLC: 6.7 %

## 2020-12-02 PROCEDURE — 82962 GLUCOSE BLOOD TEST: CPT | Performed by: INTERNAL MEDICINE

## 2020-12-02 PROCEDURE — 99214 OFFICE O/P EST MOD 30 MIN: CPT | Performed by: INTERNAL MEDICINE

## 2020-12-02 PROCEDURE — 83036 HEMOGLOBIN GLYCOSYLATED A1C: CPT | Performed by: INTERNAL MEDICINE

## 2020-12-02 RX ORDER — BLOOD SUGAR DIAGNOSTIC
STRIP MISCELLANEOUS
COMMUNITY
End: 2020-12-22

## 2020-12-02 RX ORDER — ALLOPURINOL 100 MG/1
TABLET ORAL
Qty: 180 TAB | Refills: 0 | Status: SHIPPED | OUTPATIENT
Start: 2020-12-02 | End: 2021-02-21

## 2020-12-02 NOTE — PROGRESS NOTES
History and Physical    Patient: Mandy Klinefelter MRN: [id number]  SSN: [id number]    YOB: 1945  Age: 76 y.o. Sex: female      Subjective:      Mandy Klinefelter is a 76 y.o. female with past medical history of hypertension, hyperlipidemia, chronic kidney disease stage III, depression, breast cancer status post surgery, radiation is sent to me by primary care physician Dr. Atilio Engel for type 2 diabetes mellitus. patient is a poor historian. The last visit we continue Tradjenta 5 mg daily and glipizide 5 mg twice a day was started. Patient is taking this regularly. She has not made any changes in her eating habits. She is requesting to see a dietitian or diabetes educator to help her with managing her diet. She generally eats sweets at bedtime including doughnuts, cookies, etc.  She is checking blood glucose once a day in the morning. She denies symptoms of hypoglycemia. she has hyperthyroidism and thyroid nodule which is being managed by another endocrinologist Dr. Volodymyr Kimball. Glucometer reading: Patient is checking blood glucose once a day, in the morning, fasting.  Readings are ranging from 178 to 246 mg/dL    uupdated diabetes history:  · Diagnosis: 4 years    · Current treatment: Tradjenta 5 mg daily, glipizide 5 mg twice a day    · Past treatment: glimepiride (hypoglycemia,, chronic kidney disease stage III)    · Glucose checks: once a day    · Hyperglycemia: no    · Hypoglycemia: no    · Meals per day: 2, brunch: cheese, cereal etc, dinner: cabbage, or fast food, snacks: crackers     · Exercise: no    · DM related hospitalizations: no        Complications of DM:    · CAD: no    · CVA: no    · PVD: no    · Amputations: no     · Retinopathy: no; last exam was 2/2019    · Gastropathy: no    · Nephropathy: yes    · Neuropathy: no        Medications:    · Statin: atorvastatin    · ACE-I: losartan    · ASA: yes        · Diabetes education: no    Subclinical hyperthyroidism:  Initially noted in labs in September 2016. Started on methimazole 2.5 mg daily in June 2017. Patient was previously seeing endocrinologist Dr. Helga Navarro for this. Patient tells me that recently methimazole dose was increased from 2.5 mg to 5 mg. Patient does not know why this was done but she tells she has been taking this for less than 1 month. Energy level is poor, which is not new, sleep is poor, again not new. Bowel movements regular, no heat or cold intolerance, no palpitations. Multinodular goiter:  Thyroid ultrasound August 2019:  Right lobe has 4.5 x 2.4 x 4 cm complex cyst    Left lobe has 1.2 x 0.9 x 1 cm solid nodule in the upper part,  1.6 x 1.7 x 1.1 cm cystic and solid nodule in the mid region  1.8 x 1.3 x 2.1 cm solid nodule in the inferior region    Right lobe complex nodule and left lobe 2.1 cm nodules were biopsied in October 2019. Right lobe nodule resulted as benign, left lobe nodule insufficient cells. Patient denies any difficulty in swallowing but she feels like the nodules in her neck are increasing in size. No family history of thyroid cancer. Patient had breast cancer.   Past Medical History:   Diagnosis Date    Adjustment disorder with depressed mood 4/10/2017    Adrenal adenoma 4/10/2017    Anxiety disorder 4/10/2017    Arthritis     Atherosclerosis     Backache 7/31/2020    Benign essential hypertension 7/31/2020    Breast cancer ([facility] [address]     Carcinoma in situ of breast 7/31/2020    Cataract 4/10/2017    Chronic kidney disease, stage 3 (moderate) 9/5/2019    Coronary arteriosclerosis 4/10/2017    Dehydration 7/31/2020    Depressive disorder 4/10/2017    Dyslipidemia 4/10/2017    Dyspnea 7/31/2020    Gastroesophageal reflux disease 4/10/2017    Gout     Heart disease     Heartburn     Hirsutism 4/10/2017    History of malignant neoplasm of breast 7/31/2020    HTN (hypertension)     Hyperkalemia 4/10/2017    Hyperlipidemia     Hypertensive disorder 7/31/2020    Hypertensive renal disease 7/31/2020    Hyperthyroidism     Insomnia 4/10/2017    Kidney disease     Lesion of skin of face 4/10/2017    Mixed hyperlipidemia 9/5/2019    Mixed hyperlipidemia due to type 2 diabetes mellitus ([facility] [address] 7/31/2020    Subcutaneous mass of neck 3/25/2019    Thyroid disease     Thyroid nodule 9/5/2019    Tobacco user 4/10/2017    Type 2 diabetes mellitus with hyperglycemia ([address] 9/5/2019    Varicose vein of leg     Vitamin D deficiency 4/10/2017     Past Surgical History:   Procedure Laterality Date    BREAST SURGERY PROCEDURE UNLISTED Right 01/20/2014    Drainage of fluid    BREAST SURGERY PROCEDURE UNLISTED  04/03/2013    BIOPSY BREAST PERCUT W/IMAGE    BREAST SURGERY PROCEDURE UNLISTED  07/26/2012    PLACE BREAST CLIP PERCUT    BREAST SURGERY PROCEDURE UNLISTED  07/26/2012    BIOPSY BREAST PERCUT W/DEVICE    BREAST SURGERY PROCEDURE UNLISTED  08/18/2011    BIOPSY BREAST PERCUT W/IMAGE    BREAST SURGERY PROCEDURE UNLISTED  08/11/2011    PLACE BREAST CLIP PERCUT    BREAST SURGERY PROCEDURE UNLISTED  08/11/2011    BIOPSY BREAST PERCUT W/DEVICE    HX BREAST BIOPSY  08/18/2011    BIOPSY/REMOVAL LYMPH NODES    HX CATARACT REMOVAL  11/22/2016    HX OTHER SURGICAL  01/01/2004    Neck surgery    HX PARTIAL HYSTERECTOMY  01/01/1980    HX TONSILLECTOMY  01/01/1950      Family History   Problem Relation Age of Onset    Gall Bladder Disease Mother     Diabetes Sister     Headache Sister     Hypertension Sister     Lung Disease Sister     Migraines Sister     Alcohol abuse Brother     Cancer Brother     Hypertension Brother     Alcohol abuse Maternal Aunt     Cancer Maternal Aunt     Diabetes Maternal Aunt     Alcohol abuse Maternal Uncle     Alcohol abuse Paternal Uncle     Diabetes Maternal Grandmother     Heart Disease Maternal Grandmother     Hypertension Maternal Grandmother     Asthma Child     Prostate Cancer Father Social History     Tobacco Use    Smoking status: Current Every Day Smoker     Packs/day: 0.50     Years: 60.00     Pack years: 30.00    Smokeless tobacco: Never Used    Tobacco comment: 1/2 pack per day. Substance Use Topics    Alcohol use: No      Prior to Admission medications    Medication Sig Start Date End Date Taking? Authorizing Provider   allopurinoL (ZYLOPRIM) 100 mg tablet Take 2 tablets by mouth once daily 12/2/20  Yes Lyudmila Quiroz MD   glucose blood VI test strips (OneTouch Verio test strips) strip OneTouch Verio test strips   Yes Provider, Historical   glipiZIDE (GLUCOTROL) 5 mg tablet Take 1 Tab by mouth two (2) times a day for 30 days. Take 20 mins before meals, do not take if not eating 11/20/20 12/20/20 Yes Zuleika Iyer MD   albuterol (Ventolin HFA) 90 mcg/actuation inhaler Take 2 Puffs by inhalation every four (4) hours as needed for Wheezing. 10/22/20  Yes Lyudmila Quiroz MD   amLODIPine (NORVASC) 5 mg tablet Take 1 Tab by mouth daily. 10/22/20  Yes Lyudmila Quiroz MD   lancets (OneTouch Delica Plus Lancet) 33 gauge misc OneTouch Delica Plus Lancet 33 gauge   USE 1 TO CHECK GLUCOSE TWICE DAILY   Yes Provider, Historical   linaGLIPtin (Tradjenta) 5 mg tablet Tradjenta 5 mg tablet   TAKE 1 TABLET BY MOUTH ONCE DAILY IN THE MORNING FOR 30 DAYS   Yes Provider, Historical   ALPRAZolam (XANAX) 0.5 mg tablet Take 1 Tab by mouth two (2) times daily as needed for Anxiety or Sleep. Max Daily Amount: 1 mg. 10/22/20  Yes Lyudmila Quiroz MD   methIMAzole (TAPAZOLE) 5 mg tablet Take 1 Tab by mouth daily. Taking one tablet daily 10/14/20  Yes Rochelle Greene MD   letrozole [facility]) 2.5 mg tablet Take 1 tablet by mouth once daily 10/5/20  Yes Lyudmila Quiroz MD   cholecalciferol, vitamin D3, (Vitamin D3) 50 mcg (2,000 unit) tab Take  by mouth. Yes Provider, Historical   diclofenac (VOLTAREN) 1 % gel Apply 2 g to affected area four (4) times daily.    Yes Provider, Historical OneTouch Verio Flex meter misc USE TO CHECK BLOOD SUGAR TWICE A DAY   Yes Provider, Historical   OneTouch Verio test strips strip USE WITH BLOOD GLUCOSE METER TO CHECK BLOOD SUGAR TWICE A DAY   Yes Provider, Historical   metoprolol succinate (TOPROL-XL) 25 mg XL tablet TAKE 1 2 (ONE HALF) TABLET BY MOUTH ONCE DAILY 9/26/19  Yes Provider, Historical   omeprazole (PRILOSEC) 40 mg capsule Take 40 mg by mouth daily. Yes Provider, Historical   vitamin E (AQUA GEMS) 400 unit capsule Take 400 Units by mouth daily. Yes Provider, Historical   peg 400-propylene glycol (SYSTANE, PROPYLENE GLYCOL,) 0.4-0.3 % drop as needed. Yes Provider, Historical   multivit-minerals/folic acid (ADULT MULTIVITAMIN GUMMIES PO) Take 2 Tabs by mouth daily. Yes Provider, Historical   azelastine (ASTEPRO) 0.15 % (205.5 mcg) two (2) times a day. Yes Provider, Historical   losartan (COZAAR) 100 mg tablet Take 100 mg by mouth daily. Yes Provider, Historical   aspirin delayed-release 81 mg tablet Take 81 mg by mouth daily. Yes Provider, Historical   COLCRYS 0.6 mg tablet  4/11/17  Yes Provider, Historical   atorvastatin (LIPITOR) 40 mg tablet Take 40 mg by mouth daily. Yes Provider, Historical   turmeric root extract 500 mg cap Take 500 mg by mouth daily. Provider, Historical        Allergies   Allergen Reactions    Iodinated Contrast Media Anaphylaxis    Codeine Itching       Review of Systems:  ROS    A comprehensive review of systems was preformed and it is negative except mentioned in HPI    Objective:     Vitals:    12/02/20 1549   BP: 120/77   Pulse: 77   Temp: (!) 95.8 °F (35.4 °C)   TempSrc: Temporal   SpO2: 95%   Weight: 191 lb 9.6 oz (86.9 kg)   Height: 5' 4.5\" (1.638 m)        Physical Exam:    Physical Exam  Vitals signs and nursing note reviewed. Constitutional:       Appearance: Normal appearance. HENT:      Head: Normocephalic and atraumatic.    Cardiovascular:      Rate and Rhythm: Normal rate and regular rhythm. Pulmonary:      Effort: Pulmonary effort is normal.      Breath sounds: Normal breath sounds. Neurological:      Mental Status: She is alert. Labs and Imaging:  Results for Melissa Mustafa (MRN [id number]) as of 12/2/2020 16:29   Ref.  Range 9/18/2017 00:00 8/11/2020 13:43 10/21/2020 12:29   T4, Free Latest Ref Range: 0.82 - 1.77 ng/dL 0.51 (L) 0.81 (L) 0.86   TSH Latest Ref Range: 0.450 - 4.500 uIU/mL 1.800  0.788     Last 3 Recorded Weights in this Encounter    12/02/20 1549   Weight: 191 lb 9.6 oz (86.9 kg)        Lab Results   Component Value Date/Time    Hemoglobin A1c, External 6.7 03/04/2020    Hemoglobin A1c, External 6.8 01/26/2018        Assessment:     Patient Active Problem List   Diagnosis Code    Chronic tension headache G44.229    Medication overuse headache G44.40    Chronic non-specific white matter lesions on MRI G93.9    Mild cerebral atrophy (HCC) G31.9    Hyperthyroidism E05.90    Coarse tremors G25.2    Severe obesity ([address] E66.01    Adjustment disorder with depressed mood F43.21    Adrenal adenoma D35.00    Anxiety disorder F41.9    Cataract H26.9    Stage 3 chronic kidney disease N18.30    Coronary arteriosclerosis I25.10    Depressive disorder F32.9    Dyslipidemia E78.5    Gastroesophageal reflux disease K21.9    Hirsutism L68.0    Hyperkalemia E87.5    Insomnia G47.00    Mixed hyperlipidemia E78.2    Thyroid nodule E04.1    Type 2 diabetes mellitus with hyperglycemia (HCC) E11.65    Vitamin D deficiency E55.9    Tobacco user Z72.0    Lesion of skin of face L98.9    Subcutaneous mass of neck R22.1    Backache M54.9    Benign essential hypertension I10    Carcinoma in situ of breast D05.90    Dehydration E86.0    Dyspnea R06.00    History of malignant neoplasm of breast Z85.3    Hypertensive disorder I10    Hypertensive renal disease I12.9    Mixed hyperlipidemia due to type 2 diabetes mellitus (HCC) E11.69, E78.2    Heartburn R12    Kidney disease N28.9    Headache disorder R51.9    Gout M10.9    Tremor R25.1    Sialoadenitis of submandibular gland K11.20    Polyp of colon K63.5    Type 2 diabetes mellitus with hyperglycemia, without long-term current use of insulin (HCC) E11.65    Multinodular goiter E04.2           Plan:     type II diabetes mellitus uncontrolled  Hemoglobin A1c was 7% in April 2019, 6.5% on 9-5-2019, 6.3% on 12-5-2019, 6.7% in 3-2020, 6.7% today    Fingerstick blood glucose is 196 mg/dL in my office today. Up to date with diabetes related annual labs: yes    Up to date with diabetic eye exam: yes    plan: Although glucose control is not adequate, avoiding hypoglycemia is important. Continue Tradjenta 5 mg daily, glipizide 5 mg twice a day. Advised patient to check blood glucose at different times of the day. I will ask diabetes educator to contact patient to schedule appointment for diabetic diet education. hypertensive renal disease  blood pressure well controlled on current medications. chronic kidney disease stage 3  GFR 38 in August 2019. Closely following with nephrology. hyperthyroidism  on methimazole 2.5 mg daily. TSH was normal in August 2019 and October 2020, free T4 was low. However, methimazole was increased to 5 mg daily less than 1 month back. I will check TSH at next visit. mixed hyperlipidemia  LDL was 66 in April 2019. Continue atorvastatin 40 mg daily. thyroid nodule  As it has been 1 year since her last biopsy, I will repeat thyroid ultrasound. history of malignant neoplasm of breast  status post surgery, radiation. Now on letrozole.   Orders Placed This Encounter    US THYROID/PARATHYROID/SOFT TISS     Standing Status:   Future     Standing Expiration Date:   1/2/2022     Order Specific Question:   Reason for Exam     Answer:   thyroid nodules    US THYROID/PARATHYROID/SOFT TISS     Standing Status:   Future     Standing Expiration Date:   1/2/2022     Scheduling Instructions:      Schedule at [facility]    AMB POC GLUCOSE BLOOD, BY GLUCOSE MONITORING DEVICE    AMB POC HEMOGLOBIN A1C        Signed By: Kay Agustin MD     December 2, 2020      Return to clinic 1 month

## 2020-12-10 ENCOUNTER — HOSPITAL ENCOUNTER (OUTPATIENT)
Dept: ULTRASOUND IMAGING | Age: 75
Discharge: HOME | End: 2020-12-10
Attending: INTERNAL MEDICINE
Payer: MEDICARE

## 2020-12-10 DIAGNOSIS — E04.2 MULTINODULAR GOITER: ICD-10-CM

## 2020-12-10 PROCEDURE — 76536 US EXAM OF HEAD AND NECK: CPT

## 2020-12-14 DIAGNOSIS — E04.2 MULTINODULAR GOITER: Primary | ICD-10-CM

## 2020-12-14 NOTE — PROGRESS NOTES
Please inform patient that there is a nodule in right thyroid lobe and one in left thyroid lobe which appeared to have enlarged and looks suspicious to rule out thyroid cancer. I am going to order thyroid biopsy to be done at [facility]. She will be called for scheduling. Donna: Please fax thyroid biopsy order to [facility] interventional radiology.

## 2020-12-15 ENCOUNTER — TELEPHONE (OUTPATIENT)
Dept: ENDOCRINOLOGY | Age: 75
End: 2020-12-15

## 2020-12-15 NOTE — TELEPHONE ENCOUNTER
Patient notified    ----- Message from Santhosh Whalen MD sent at 12/14/2020  2:19 PM EST -----  Please inform patient that there is a nodule in right thyroid lobe and one in left thyroid lobe which appeared to have enlarged and looks suspicious to rule out thyroid cancer. I am going to order thyroid biopsy to be done at [facility]. She will be called for scheduling. Donna: Please fax thyroid biopsy order to [facility] interventional radiology.

## 2020-12-17 RX ORDER — LIDOCAINE HYDROCHLORIDE 20 MG/ML
1-5 INJECTION, SOLUTION INFILTRATION; PERINEURAL
Status: CANCELLED | OUTPATIENT
Start: 2020-12-17

## 2020-12-17 RX ORDER — MIDAZOLAM HYDROCHLORIDE 1 MG/ML
.25-2 INJECTION, SOLUTION INTRAMUSCULAR; INTRAVENOUS
Status: CANCELLED | OUTPATIENT
Start: 2020-12-17

## 2020-12-17 RX ORDER — FENTANYL CITRATE 50 UG/ML
25-200 INJECTION, SOLUTION INTRAMUSCULAR; INTRAVENOUS
Status: CANCELLED | OUTPATIENT
Start: 2020-12-17

## 2020-12-21 ENCOUNTER — HOSPITAL ENCOUNTER (OUTPATIENT)
Dept: INTERVENTIONAL RADIOLOGY/VASCULAR | Age: 75
Discharge: HOME | End: 2020-12-21
Attending: INTERNAL MEDICINE
Payer: MEDICARE

## 2020-12-21 ENCOUNTER — HOSPITAL ENCOUNTER (OUTPATIENT)
Dept: INTERVENTIONAL RADIOLOGY/VASCULAR | Age: 75
End: 2020-12-21
Attending: INTERNAL MEDICINE
Payer: MEDICARE

## 2020-12-21 VITALS — HEIGHT: 64 IN | BODY MASS INDEX: 31.58 KG/M2 | WEIGHT: 185 LBS

## 2020-12-21 DIAGNOSIS — E04.2 MULTINODULAR GOITER: ICD-10-CM

## 2020-12-21 PROCEDURE — 10007 FNA BX W/FLUOR GDN 1ST LES: CPT

## 2020-12-21 PROCEDURE — 10008 FNA BX W/FLUOR GDN EA ADDL: CPT

## 2020-12-21 PROCEDURE — 88108 CYTOPATH CONCENTRATE TECH: CPT

## 2020-12-26 RX ORDER — LANCETS 33 GAUGE
EACH MISCELLANEOUS
Qty: 180 LANCET | Refills: 1 | Status: SHIPPED | OUTPATIENT
Start: 2020-12-26 | End: 2021-01-08 | Stop reason: SDUPTHER

## 2020-12-26 RX ORDER — LETROZOLE 2.5 MG/1
TABLET, FILM COATED ORAL
Qty: 90 TAB | Refills: 0 | Status: SHIPPED | OUTPATIENT
Start: 2020-12-26 | End: 2021-01-01

## 2021-01-01 RX ORDER — LETROZOLE 2.5 MG/1
TABLET, FILM COATED ORAL
Qty: 90 TAB | Refills: 0 | Status: SHIPPED | OUTPATIENT
Start: 2021-01-01 | End: 2021-07-21 | Stop reason: ALTCHOICE

## 2021-01-04 ENCOUNTER — TELEPHONE (OUTPATIENT)
Dept: PRIMARY CARE CLINIC | Age: 76
End: 2021-01-04

## 2021-01-06 ENCOUNTER — OFFICE VISIT (OUTPATIENT)
Dept: ENDOCRINOLOGY | Age: 76
End: 2021-01-06
Payer: MEDICARE

## 2021-01-06 VITALS
DIASTOLIC BLOOD PRESSURE: 76 MMHG | WEIGHT: 191.8 LBS | HEART RATE: 108 BPM | HEIGHT: 65 IN | BODY MASS INDEX: 31.96 KG/M2 | OXYGEN SATURATION: 97 % | TEMPERATURE: 97.7 F | SYSTOLIC BLOOD PRESSURE: 119 MMHG

## 2021-01-06 DIAGNOSIS — E05.90 SUBCLINICAL HYPERTHYROIDISM: ICD-10-CM

## 2021-01-06 DIAGNOSIS — E11.65 TYPE 2 DIABETES MELLITUS WITH HYPERGLYCEMIA, UNSPECIFIED WHETHER LONG TERM INSULIN USE (HCC): Primary | ICD-10-CM

## 2021-01-06 LAB
BILIRUB UR QL STRIP: NORMAL
GLUCOSE POC: 164 MG/DL
GLUCOSE UR-MCNC: NORMAL MG/DL
KETONES P FAST UR STRIP-MCNC: NORMAL MG/DL
PH UR STRIP: NORMAL [PH] (ref 4.6–8)
PROT UR QL STRIP: NORMAL
SP GR UR STRIP: NORMAL (ref 1–1.03)
UA UROBILINOGEN AMB POC: NORMAL (ref 0.2–1)
URINALYSIS CLARITY POC: NORMAL
URINALYSIS COLOR POC: NORMAL
URINE BLOOD POC: NORMAL
URINE LEUKOCYTES POC: NORMAL
URINE NITRITES POC: NORMAL

## 2021-01-06 PROCEDURE — G8427 DOCREV CUR MEDS BY ELIG CLIN: HCPCS | Performed by: INTERNAL MEDICINE

## 2021-01-06 PROCEDURE — G9717 DOC PT DX DEP/BP F/U NT REQ: HCPCS | Performed by: INTERNAL MEDICINE

## 2021-01-06 PROCEDURE — G8536 NO DOC ELDER MAL SCRN: HCPCS | Performed by: INTERNAL MEDICINE

## 2021-01-06 PROCEDURE — G8752 SYS BP LESS 140: HCPCS | Performed by: INTERNAL MEDICINE

## 2021-01-06 PROCEDURE — G8400 PT W/DXA NO RESULTS DOC: HCPCS | Performed by: INTERNAL MEDICINE

## 2021-01-06 PROCEDURE — G8754 DIAS BP LESS 90: HCPCS | Performed by: INTERNAL MEDICINE

## 2021-01-06 PROCEDURE — 3017F COLORECTAL CA SCREEN DOC REV: CPT | Performed by: INTERNAL MEDICINE

## 2021-01-06 PROCEDURE — 2022F DILAT RTA XM EVC RTNOPTHY: CPT | Performed by: INTERNAL MEDICINE

## 2021-01-06 PROCEDURE — 3288F FALL RISK ASSESSMENT DOCD: CPT | Performed by: INTERNAL MEDICINE

## 2021-01-06 PROCEDURE — 3046F HEMOGLOBIN A1C LEVEL >9.0%: CPT | Performed by: INTERNAL MEDICINE

## 2021-01-06 PROCEDURE — 82962 GLUCOSE BLOOD TEST: CPT | Performed by: INTERNAL MEDICINE

## 2021-01-06 PROCEDURE — 81003 URINALYSIS AUTO W/O SCOPE: CPT | Performed by: INTERNAL MEDICINE

## 2021-01-06 PROCEDURE — 1090F PRES/ABSN URINE INCON ASSESS: CPT | Performed by: INTERNAL MEDICINE

## 2021-01-06 PROCEDURE — 99214 OFFICE O/P EST MOD 30 MIN: CPT | Performed by: INTERNAL MEDICINE

## 2021-01-06 PROCEDURE — 1100F PTFALLS ASSESS-DOCD GE2>/YR: CPT | Performed by: INTERNAL MEDICINE

## 2021-01-06 PROCEDURE — G8417 CALC BMI ABV UP PARAM F/U: HCPCS | Performed by: INTERNAL MEDICINE

## 2021-01-06 RX ORDER — BLOOD SUGAR DIAGNOSTIC
STRIP MISCELLANEOUS
Qty: 50 STRIP | Refills: 5 | Status: SHIPPED | OUTPATIENT
Start: 2021-01-06 | End: 2021-02-17 | Stop reason: SDUPTHER

## 2021-01-06 RX ORDER — SODIUM BICARBONATE 650 MG/1
TABLET ORAL
COMMUNITY
Start: 2020-11-13

## 2021-01-06 NOTE — PROGRESS NOTES
History and Physical    Patient: Malika Vick MRN: [id number]  SSN: [id number]    YOB: 1945  Age: 76 y.o. Sex: female      Subjective:      Malika Vick is a 76 y.o. female with past medical history of hypertension, hyperlipidemia, chronic kidney disease stage III, depression, breast cancer status post surgery, radiation is sent to me by primary care physician Dr. Taisha Ayala for type 2 diabetes mellitus. patient is a poor historian. The last visit we continue Tradjenta 5 mg daily and glipizide 5 mg twice a day was started. Patient is taking this regularly. She has not made any changes in her eating habits. She is requesting to see a dietitian or diabetes educator to help her with managing her diet. She generally eats sweets at bedtime including doughnuts, cookies, etc.  She is checking blood glucose once a day in the morning. She denies symptoms of hypoglycemia. Patient is interested in getting diabetes education. However, she is not able to drive to [facility] for this. Glucometer reading: Did not bring glucometer today    uupdated diabetes history:  · Diagnosis: 4 years    · Current treatment: Tradjenta 5 mg daily, glipizide 5 mg twice a day    · Past treatment: glimepiride (hypoglycemia,, chronic kidney disease stage III)    · Glucose checks: once a day    · Hyperglycemia: no    · Hypoglycemia: no    · Meals per day: 2, brunch: cheese, cereal etc, dinner: cabbage, or fast food, snacks: crackers     · Exercise: no    · DM related hospitalizations: no        Complications of DM:    · CAD: no    · CVA: no    · PVD: no    · Amputations: no     · Retinopathy: no; last exam was 2/2019    · Gastropathy: no    · Nephropathy: yes    · Neuropathy: no        Medications:    · Statin: atorvastatin    · ACE-I: losartan    · ASA: yes        · Diabetes education: no    Subclinical hyperthyroidism:  Initially noted in labs in September 2016.   Started on methimazole 2.5 mg daily in June 2017. Patient was previously seeing endocrinologist Dr. Darrel Wadsworth for this. Patient tells me that recently methimazole dose was increased from 2.5 mg to 5 mg. Patient does not know why this was done but she tells she has been taking this for less than 1 month. Energy level is poor, which is not new, sleep is poor, again not new. Bowel movements regular, no heat or cold intolerance, no palpitations. Today patient is complaining of headaches and weight gain and she is wondering if it is because of increasing methimazole dose. Multinodular goiter:  Thyroid ultrasound August 2019:  Right lobe has 4.5 x 2.4 x 4 cm complex cyst    Left lobe has 1.2 x 0.9 x 1 cm solid nodule in the upper part,  1.6 x 1.7 x 1.1 cm cystic and solid nodule in the mid region  1.8 x 1.3 x 2.1 cm solid nodule in the inferior region    Right lobe complex nodule and left lobe 2.1 cm nodules were biopsied in October 2019. Right lobe nodule resulted as benign, left lobe nodule insufficient cells. Patient denies any difficulty in swallowing but she feels like the nodules in her neck are increasing in size. No family history of thyroid cancer. Patient had breast cancer. Thyroid ultrasound done in December 2020 came back showing right lobe 3.5 cm nodule and left lobe 2.5 cm nodule which met criteria for biopsy. Patient had biopsy done a couple weeks back and she is here to follow-up on the results.     Past Medical History:   Diagnosis Date    Adjustment disorder with depressed mood 4/10/2017    Adrenal adenoma 4/10/2017    Anxiety disorder 4/10/2017    Arthritis     Atherosclerosis     Backache 7/31/2020    Benign essential hypertension 7/31/2020    Breast cancer ([facility] [address]     Carcinoma in situ of breast 7/31/2020    Cataract 4/10/2017    Chronic kidney disease, stage 3 (moderate) 9/5/2019    Coronary arteriosclerosis 4/10/2017    Dehydration 7/31/2020    Depressive disorder 4/10/2017    Dyslipidemia 4/10/2017    Dyspnea 7/31/2020    Gastroesophageal reflux disease 4/10/2017    Gout     Heart disease     Heartburn     Hirsutism 4/10/2017    History of malignant neoplasm of breast 7/31/2020    HTN (hypertension)     Hyperkalemia 4/10/2017    Hyperlipidemia     Hypertensive disorder 7/31/2020    Hypertensive renal disease 7/31/2020    Hyperthyroidism     Insomnia 4/10/2017    Kidney disease     Lesion of skin of face 4/10/2017    Mixed hyperlipidemia 9/5/2019    Mixed hyperlipidemia due to type 2 diabetes mellitus ([address] 7/31/2020    Subcutaneous mass of neck 3/25/2019    Thyroid disease     Thyroid nodule 9/5/2019    Tobacco user 4/10/2017    Type 2 diabetes mellitus with hyperglycemia ([address] 9/5/2019    Varicose vein of leg     Vitamin D deficiency 4/10/2017     Past Surgical History:   Procedure Laterality Date    HX BREAST BIOPSY  08/18/2011    BIOPSY/REMOVAL LYMPH NODES    HX CATARACT REMOVAL  11/22/2016    HX OTHER SURGICAL  01/01/2004    Neck surgery    HX PARTIAL HYSTERECTOMY  01/01/1980    HX TONSILLECTOMY  01/01/1950    IR FINE NEEDLE ASPIRATION THYROID  12/21/2020    IR FINE NEEDLE ASPIRATION THYROID  12/21/2020    OH BREAST SURGERY PROCEDURE UNLISTED Right 01/20/2014    Drainage of fluid    OH BREAST SURGERY PROCEDURE UNLISTED  04/03/2013    BIOPSY BREAST PERCUT W/IMAGE    OH BREAST SURGERY PROCEDURE UNLISTED  07/26/2012    PLACE BREAST CLIP PERCUT    OH BREAST SURGERY PROCEDURE UNLISTED  07/26/2012    BIOPSY BREAST PERCUT W/DEVICE    OH BREAST SURGERY PROCEDURE UNLISTED  08/18/2011    BIOPSY BREAST PERCUT W/IMAGE    OH BREAST SURGERY PROCEDURE UNLISTED  08/11/2011    PLACE BREAST CLIP PERCUT    OH BREAST SURGERY PROCEDURE UNLISTED  08/11/2011    BIOPSY BREAST PERCUT W/DEVICE      Family History   Problem Relation Age of Onset    Gall Bladder Disease Mother     Diabetes Sister     Headache Sister     Hypertension Sister     Lung Disease Sister    [facility] Migraines Sister     Alcohol abuse Brother     Cancer Brother     Hypertension Brother     Alcohol abuse Maternal Aunt     Cancer Maternal Aunt     Diabetes Maternal Aunt     Alcohol abuse Maternal Uncle     Alcohol abuse Paternal Uncle     Diabetes Maternal Grandmother     Heart Disease Maternal Grandmother     Hypertension Maternal Grandmother     Asthma Child     Prostate Cancer Father      Social History     Tobacco Use    Smoking status: Current Every Day Smoker     Packs/day: 0.50     Years: 60.00     Pack years: 30.00    Smokeless tobacco: Never Used    Tobacco comment: 1/2 pack per day. Substance Use Topics    Alcohol use: No      Prior to Admission medications    Medication Sig Start Date End Date Taking? Authorizing Provider   sodium bicarbonate 650 mg tablet TAKE 2 TABLETS BY MOUTH TWICE DAILY 11/13/20  Yes Provider, Historical   glucose blood VI test strips (OneTouch Verio test strips) strip Use to check glucose once a day 1/6/21  Yes Fareed Ruth MD   [facility] 2.5 mg tablet Take 1 tablet by mouth once daily 1/1/21  Yes Tashia Liang MD   One Touch Delica 33 gauge misc USE 1  TO CHECK GLUCOSE TWICE DAILY 12/26/20  Yes Tashia Liang MD   glipiZIDE (GLUCOTROL) 5 mg tablet TAKE 1 TABLET BY MOUTH TWICE DAILY. TAKE 20 MINUTES BEFORE MEALS. DO NOT TAKE IF NOT EATING. 12/16/20  Yes Fareed Ruth MD   allopurinoL (ZYLOPRIM) 100 mg tablet Take 2 tablets by mouth once daily 12/2/20  Yes Tashia Liang MD   albuterol (Ventolin HFA) 90 mcg/actuation inhaler Take 2 Puffs by inhalation every four (4) hours as needed for Wheezing. 10/22/20  Yes Tashia Liang MD   amLODIPine (NORVASC) 5 mg tablet Take 1 Tab by mouth daily.  10/22/20  Yes Tashia Liang MD   linaGLIPtin (Tradjenta) 5 mg tablet Tradjenta 5 mg tablet   TAKE 1 TABLET BY MOUTH ONCE DAILY IN THE MORNING FOR 30 DAYS   Yes Provider, Historical   ALPRAZolam (XANAX) 0.5 mg tablet Take 1 Tab by mouth two (2) times daily as needed for Anxiety or Sleep. Max Daily Amount: 1 mg. 10/22/20  Yes Claude Pay, MD   methIMAzole (TAPAZOLE) 5 mg tablet Take 1 Tab by mouth daily. Taking one tablet daily 10/14/20  Yes Alvin Alarcon MD   cholecalciferol, vitamin D3, (Vitamin D3) 50 mcg (2,000 unit) tab Take  by mouth. Yes Provider, Historical   diclofenac (VOLTAREN) 1 % gel Apply 2 g to affected area four (4) times daily. Yes Provider, Historical   OneTouch Verio Flex meter misc USE TO CHECK BLOOD SUGAR TWICE A DAY   Yes Provider, Historical   metoprolol succinate (TOPROL-XL) 25 mg XL tablet TAKE 1 2 (ONE HALF) TABLET BY MOUTH ONCE DAILY 9/26/19  Yes Provider, Historical   omeprazole (PRILOSEC) 40 mg capsule Take 40 mg by mouth daily. Yes Provider, Historical   turmeric root extract 500 mg cap Take 500 mg by mouth daily. Yes Provider, Historical   vitamin E (AQUA GEMS) 400 unit capsule Take 400 Units by mouth daily. Yes Provider, Historical   peg 400-propylene glycol (SYSTANE, PROPYLENE GLYCOL,) 0.4-0.3 % drop as needed. Yes Provider, Historical   multivit-minerals/folic acid (ADULT MULTIVITAMIN GUMMIES PO) Take 2 Tabs by mouth daily. Yes Provider, Historical   azelastine (ASTEPRO) 0.15 % (205.5 mcg) two (2) times a day. Yes Provider, Historical   losartan (COZAAR) 100 mg tablet Take 100 mg by mouth daily. Yes Provider, Historical   aspirin delayed-release 81 mg tablet Take 81 mg by mouth daily. Yes Provider, Historical   COLCRYS 0.6 mg tablet  4/11/17  Yes Provider, Historical   atorvastatin (LIPITOR) 40 mg tablet Take 40 mg by mouth daily.    Yes Provider, Historical        Allergies   Allergen Reactions    Iodinated Contrast Media Anaphylaxis    Codeine Itching       Review of Systems:  ROS    A comprehensive review of systems was preformed and it is negative except mentioned in HPI    Objective:     Vitals:    01/06/21 1338   BP: 119/76   Pulse: (!) 108   Temp: 97.7 °F (36.5 °C)   TempSrc: Temporal SpO2: 97%   Weight: 191 lb 12.8 oz (87 kg)   Height: 5' 4.5\" (1.638 m)        Physical Exam:    Physical Exam  Vitals signs and nursing note reviewed. Constitutional:       Appearance: Normal appearance. HENT:      Head: Normocephalic and atraumatic. Cardiovascular:      Rate and Rhythm: Normal rate and regular rhythm. Pulmonary:      Effort: Pulmonary effort is normal.      Breath sounds: Normal breath sounds. Neurological:      Mental Status: She is alert. Labs and Imaging:  Results for Chari Spann (MRN [id number]) as of 12/2/2020 16:29   Ref. Range 9/18/2017 00:00 8/11/2020 13:43 10/21/2020 12:29   T4, Free Latest Ref Range: 0.82 - 1.77 ng/dL 0.51 (L) 0.81 (L) 0.86   TSH Latest Ref Range: 0.450 - 4.500 uIU/mL 1.800  0.788     Thyroid US 12-:  FINDINGS: Thyroid ultrasound. Right lobe 3.4 x 2.5 x 4.5 cm. Left lobe enlarged  5.3 x 2.7 x 3.0 cm. Isthmus thickened 0.6 cm.     Right lobe complex cyst versus solid nodule measures approximately 3.5 x 1.8 x  2.8 cm. TI-RADS 3. Recommend FNA.     Left lobe contains multiple nodules:  Left upper lobe anterior isoechoic well-defined 1.7 x 1.2 x by 1.4 cm. It is  difficult to tell if this nodule has microcalcifications. TI-RADS 3-4. Consider  FNA. Left lobe posterior complex cystic and solid 2.5 x 1.5 x 1.6 cm equivocally  taller rather than wide. TI-RADS 3-4. Recommend FNA. Anterior complex cyst 1.5 x 1.2 x 2.3 cm. TI-RADS 3. Recommend annual  sonographic follow-up.   Last 3 Recorded Weights in this Encounter    01/06/21 1338   Weight: 191 lb 12.8 oz (87 kg)        Lab Results   Component Value Date/Time    Hemoglobin A1c, External 6.7 03/04/2020    Hemoglobin A1c, External 6.8 01/26/2018        Assessment:     Patient Active Problem List   Diagnosis Code    Chronic tension headache G44.229    Medication overuse headache G44.40    Chronic non-specific white matter lesions on MRI G93.9    Mild cerebral atrophy (HCC) G31.9    Subclinical hyperthyroidism E05.90    Coarse tremors G25.2    Severe obesity (HCC) E66.01    Adjustment disorder with depressed mood F43.21    Adrenal adenoma D35.00    Anxiety disorder F41.9    Cataract H26.9    Stage 3 chronic kidney disease N18.30    Coronary arteriosclerosis I25.10    Depressive disorder F32.9    Dyslipidemia E78.5    Gastroesophageal reflux disease K21.9    Hirsutism L68.0    Hyperkalemia E87.5    Insomnia G47.00    Mixed hyperlipidemia E78.2    Thyroid nodule E04.1    Type 2 diabetes mellitus with hyperglycemia (HCC) E11.65    Vitamin D deficiency E55.9    Tobacco user Z72.0    Lesion of skin of face L98.9    Subcutaneous mass of neck R22.1    Backache M54.9    Benign essential hypertension I10    Carcinoma in situ of breast D05.90    Dehydration E86.0    Dyspnea R06.00    History of malignant neoplasm of breast Z85.3    Hypertensive disorder I10    Hypertensive renal disease I12.9    Mixed hyperlipidemia due to type 2 diabetes mellitus (HCC) E11.69, E78.2    Heartburn R12    Kidney disease N28.9    Headache disorder R51.9    Gout M10.9    Tremor R25.1    Sialoadenitis of submandibular gland K11.20    Polyp of colon K63.5    Type 2 diabetes mellitus with hyperglycemia, without long-term current use of insulin ([facility]) E11.65    Multinodular goiter E04.2           Plan:     type II diabetes mellitus uncontrolled  Hemoglobin A1c was 7% in April 2019, 6.5% on 9-5-2019, 6.3% on 12-5-2019, 6.7% in 3-2020, 6.7% on 12-2-2020    Fingerstick blood glucose is 164 mg/dL in my office today. Up to date with diabetes related annual labs: yes    Up to date with diabetic eye exam: yes    plan: Although glucose control is not adequate, avoiding hypoglycemia is important. Continue Tradjenta 5 mg daily, glipizide 5 mg twice a day. Advised patient to check blood glucose at different times of the day.   Advised patient to check at [facility] to see if they still have a diabetic educator/dietitian there. Patient is not able to drive to [facility] [place] or [address] [place] [address]. hypertensive renal disease  blood pressure well controlled on current medications. chronic kidney disease stage 3  GFR 38 in August 2019. Closely following with nephrology. hyperthyroidism  on methimazole 2.5 mg daily. TSH was normal in August 2019 and October 2020, free T4 was low. However, methimazole was increased to 5 mg daily 1 month back. Recheck TSH today. mixed hyperlipidemia  LDL was 66 in April 2019. Continue atorvastatin 40 mg daily. thyroid nodule  Thyroid ultrasound 12-: Right lobe 3.5 and left lobe 2.5 cm TR 4 nodules meet criteria for biopsy. Thyroid biopsy of these nodules on 12- were resulted as benign. Plan:  Repeat ultrasound in 1 year. history of malignant neoplasm of breast  status post surgery, radiation. Now on letrozole.   Orders Placed This Encounter    TSH AND FREE T4    AMB POC GLUCOSE BLOOD, BY GLUCOSE MONITORING DEVICE    AMB POC URINALYSIS DIP STICK AUTO W/O MICRO    glucose blood VI test strips (OneTouch Verio test strips) strip     Sig: Use to check glucose once a day     Dispense:  50 Strip     Refill:  5        Signed By: Christie Chi MD     January 6, 2021      Return to clinic 2 months

## 2021-01-07 DIAGNOSIS — E05.90 HYPERTHYROIDISM: ICD-10-CM

## 2021-01-07 LAB
CHOLEST SERPL-MCNC: 127 MG/DL (ref 100–199)
HDLC SERPL-MCNC: 26 MG/DL
LDLC SERPL CALC-MCNC: 53 MG/DL (ref 0–99)
T4 FREE SERPL-MCNC: 0.69 NG/DL (ref 0.82–1.77)
TRIGL SERPL-MCNC: 307 MG/DL (ref 0–149)
TSH SERPL DL<=0.005 MIU/L-ACNC: 4.35 UIU/ML (ref 0.45–4.5)
VLDLC SERPL CALC-MCNC: 48 MG/DL (ref 5–40)

## 2021-01-07 RX ORDER — METHIMAZOLE 5 MG/1
TABLET ORAL
Qty: 45 TAB | Refills: 1 | Status: SHIPPED | OUTPATIENT
Start: 2021-01-07 | End: 2021-04-23 | Stop reason: SDUPTHER

## 2021-01-07 NOTE — PROGRESS NOTES
Please inform patient that she should decrease methimazole back to only half tablet of 5 mg every day. I am sending a refill on that prescription.

## 2021-01-08 ENCOUNTER — TELEPHONE (OUTPATIENT)
Dept: ENDOCRINOLOGY | Age: 76
End: 2021-01-08

## 2021-01-08 DIAGNOSIS — E11.65 TYPE 2 DIABETES MELLITUS WITH HYPERGLYCEMIA, WITHOUT LONG-TERM CURRENT USE OF INSULIN (HCC): Primary | ICD-10-CM

## 2021-01-08 RX ORDER — LANCETS 33 GAUGE
EACH MISCELLANEOUS
Qty: 180 LANCET | Refills: 1 | Status: SHIPPED | OUTPATIENT
Start: 2021-01-08 | End: 2021-02-17 | Stop reason: SDUPTHER

## 2021-01-08 NOTE — TELEPHONE ENCOUNTER
Patient notified    ----- Message from Brent Block MD sent at 1/7/2021  8:48 AM EST -----  Please inform patient that she should decrease methimazole back to only half tablet of 5 mg every day. I am sending a refill on that prescription.

## 2021-01-10 RX ORDER — COLCHICINE 0.6 MG/1
TABLET, FILM COATED ORAL
Qty: 90 TAB | Refills: 0 | Status: SHIPPED | OUTPATIENT
Start: 2021-01-10

## 2021-01-18 RX ORDER — OMEPRAZOLE 40 MG/1
CAPSULE, DELAYED RELEASE ORAL
Qty: 90 CAP | Refills: 0 | Status: SHIPPED | OUTPATIENT
Start: 2021-01-18

## 2021-01-20 ENCOUNTER — TELEPHONE (OUTPATIENT)
Dept: PRIMARY CARE CLINIC | Age: 76
End: 2021-01-20

## 2021-01-26 ENCOUNTER — TELEPHONE (OUTPATIENT)
Dept: PRIMARY CARE CLINIC | Age: 76
End: 2021-01-26

## 2021-02-01 ENCOUNTER — TELEPHONE (OUTPATIENT)
Dept: PRIMARY CARE CLINIC | Age: 76
End: 2021-02-01

## 2021-02-10 DIAGNOSIS — E11.65 TYPE 2 DIABETES MELLITUS WITH HYPERGLYCEMIA, WITHOUT LONG-TERM CURRENT USE OF INSULIN (HCC): ICD-10-CM

## 2021-02-10 RX ORDER — GLIPIZIDE 5 MG/1
5 TABLET ORAL 2 TIMES DAILY
Qty: 180 TAB | Refills: 1 | Status: SHIPPED | OUTPATIENT
Start: 2021-02-10 | End: 2021-02-17 | Stop reason: SDUPTHER

## 2021-02-17 ENCOUNTER — OFFICE VISIT (OUTPATIENT)
Dept: ENDOCRINOLOGY | Age: 76
End: 2021-02-17
Payer: MEDICARE

## 2021-02-17 VITALS
SYSTOLIC BLOOD PRESSURE: 106 MMHG | TEMPERATURE: 96.1 F | OXYGEN SATURATION: 98 % | WEIGHT: 192.2 LBS | DIASTOLIC BLOOD PRESSURE: 81 MMHG | HEART RATE: 92 BPM | HEIGHT: 65 IN | BODY MASS INDEX: 32.02 KG/M2

## 2021-02-17 DIAGNOSIS — E11.65 TYPE 2 DIABETES MELLITUS WITH HYPERGLYCEMIA, UNSPECIFIED WHETHER LONG TERM INSULIN USE (HCC): ICD-10-CM

## 2021-02-17 DIAGNOSIS — E11.65 TYPE 2 DIABETES MELLITUS WITH HYPERGLYCEMIA, WITHOUT LONG-TERM CURRENT USE OF INSULIN (HCC): Primary | ICD-10-CM

## 2021-02-17 LAB — GLUCOSE POC: 121 MG/DL

## 2021-02-17 PROCEDURE — 3017F COLORECTAL CA SCREEN DOC REV: CPT | Performed by: INTERNAL MEDICINE

## 2021-02-17 PROCEDURE — 99213 OFFICE O/P EST LOW 20 MIN: CPT | Performed by: INTERNAL MEDICINE

## 2021-02-17 PROCEDURE — 1090F PRES/ABSN URINE INCON ASSESS: CPT | Performed by: INTERNAL MEDICINE

## 2021-02-17 PROCEDURE — 3046F HEMOGLOBIN A1C LEVEL >9.0%: CPT | Performed by: INTERNAL MEDICINE

## 2021-02-17 PROCEDURE — G8754 DIAS BP LESS 90: HCPCS | Performed by: INTERNAL MEDICINE

## 2021-02-17 PROCEDURE — G8536 NO DOC ELDER MAL SCRN: HCPCS | Performed by: INTERNAL MEDICINE

## 2021-02-17 PROCEDURE — G8427 DOCREV CUR MEDS BY ELIG CLIN: HCPCS | Performed by: INTERNAL MEDICINE

## 2021-02-17 PROCEDURE — G9717 DOC PT DX DEP/BP F/U NT REQ: HCPCS | Performed by: INTERNAL MEDICINE

## 2021-02-17 PROCEDURE — G8417 CALC BMI ABV UP PARAM F/U: HCPCS | Performed by: INTERNAL MEDICINE

## 2021-02-17 PROCEDURE — 3288F FALL RISK ASSESSMENT DOCD: CPT | Performed by: INTERNAL MEDICINE

## 2021-02-17 PROCEDURE — G8752 SYS BP LESS 140: HCPCS | Performed by: INTERNAL MEDICINE

## 2021-02-17 PROCEDURE — 2022F DILAT RTA XM EVC RTNOPTHY: CPT | Performed by: INTERNAL MEDICINE

## 2021-02-17 PROCEDURE — G8400 PT W/DXA NO RESULTS DOC: HCPCS | Performed by: INTERNAL MEDICINE

## 2021-02-17 PROCEDURE — 82962 GLUCOSE BLOOD TEST: CPT | Performed by: INTERNAL MEDICINE

## 2021-02-17 PROCEDURE — 1100F PTFALLS ASSESS-DOCD GE2>/YR: CPT | Performed by: INTERNAL MEDICINE

## 2021-02-17 RX ORDER — FLUOXETINE HYDROCHLORIDE 20 MG/1
CAPSULE ORAL
COMMUNITY
Start: 2021-01-20

## 2021-02-17 RX ORDER — LINAGLIPTIN 5 MG/1
TABLET, FILM COATED ORAL
Qty: 30 TAB | Refills: 4 | Status: SHIPPED | OUTPATIENT
Start: 2021-02-17 | End: 2021-04-21 | Stop reason: SDUPTHER

## 2021-02-17 RX ORDER — BLOOD SUGAR DIAGNOSTIC
STRIP MISCELLANEOUS
Qty: 50 STRIP | Refills: 5 | Status: SHIPPED | OUTPATIENT
Start: 2021-02-17 | End: 2021-04-21 | Stop reason: SDUPTHER

## 2021-02-17 RX ORDER — LANOLIN ALCOHOL/MO/W.PET/CERES
1000 CREAM (GRAM) TOPICAL DAILY
COMMUNITY

## 2021-02-17 RX ORDER — GLIPIZIDE 5 MG/1
5 TABLET ORAL 2 TIMES DAILY
Qty: 180 TAB | Refills: 1 | Status: SHIPPED | OUTPATIENT
Start: 2021-02-17 | End: 2021-04-21 | Stop reason: ALTCHOICE

## 2021-02-17 RX ORDER — LANCETS 33 GAUGE
EACH MISCELLANEOUS
Qty: 180 LANCET | Refills: 1 | Status: SHIPPED | OUTPATIENT
Start: 2021-02-17 | End: 2021-04-21 | Stop reason: SDUPTHER

## 2021-02-17 NOTE — PROGRESS NOTES
History and Physical    Patient: Bekah Penn MRN: [id number]  SSN: [id number]    YOB: 1945  Age: 76 y.o. Sex: female      Subjective:      Bekah Penn is a 76 y.o. female with past medical history of hypertension, hyperlipidemia, chronic kidney disease stage III, depression, breast cancer status post surgery, radiation is sent to me by primary care physician Dr. Roxanne Rhodes for type 2 diabetes mellitus. patient is a poor historian. The last visit we continue Tradjenta 5 mg daily and glipizide 5 mg twice a day was started. Patient is taking this regularly. She has not made any changes in her eating habits. Patient is interested in seeing a diabetes educator/dietitian but. She was advised to consult in [facility] to check if they have a dietitian, but she totally forgot about it. She has started checking blood glucose once a day. She has not noticed any hypoglycemia. However, today patient tells me that she wakes up with a headache which subsides with Tylenol and then around 5 PM or so she starts to have another headache. She does not check her blood glucose when this happens. She does not always have to eat something to get rid of headache. Glucometer reading: Patient is checking blood glucose once a day.   Readings are ranging from 114 to 257 mg/dL    uupdated diabetes history:  · Diagnosis: 4 years    · Current treatment: Tradjenta 5 mg daily, glipizide 5 mg twice a day    · Past treatment: glimepiride (hypoglycemia,, chronic kidney disease stage III)    · Glucose checks: once a day    · Hyperglycemia: no    · Hypoglycemia: no    · Meals per day: 2, brunch: cheese, cereal etc, dinner: cabbage, or fast food, snacks: crackers     · Exercise: no    · DM related hospitalizations: no        Complications of DM:    · CAD: no    · CVA: no    · PVD: no    · Amputations: no     · Retinopathy: no; last exam was 2/2019    · Gastropathy: no    · Nephropathy: yes    · Neuropathy: no        Medications:    · Statin: atorvastatin    · ACE-I: losartan    · ASA: yes        · Diabetes education: no    Subclinical hyperthyroidism:  Initially noted in labs in September 2016. Started on methimazole 2.5 mg daily in June 2017. Patient was previously seeing endocrinologist Dr. Dominick Krishnan for this. Patient tells me that recently methimazole dose was increased from 2.5 mg to 5 mg. Patient does not know why this was done but she tells she has been taking this for less than 1 month. Energy level is poor, which is not new, sleep is poor, again not new. Bowel movements regular, no heat or cold intolerance, no palpitations. Labs done after the last visit came back showing low free T4 and normal TSH. At that time methimazole was decreased back to 2.5 mg daily. However, patient has not noticed any change in her energy level or sleep. Continues to have poor sleep. Multinodular goiter:  Thyroid ultrasound August 2019:  Right lobe has 4.5 x 2.4 x 4 cm complex cyst    Left lobe has 1.2 x 0.9 x 1 cm solid nodule in the upper part,  1.6 x 1.7 x 1.1 cm cystic and solid nodule in the mid region  1.8 x 1.3 x 2.1 cm solid nodule in the inferior region    Right lobe complex nodule and left lobe 2.1 cm nodules were biopsied in October 2019. Right lobe nodule resulted as benign, left lobe nodule insufficient cells. Patient denies any difficulty in swallowing but she feels like the nodules in her neck are increasing in size. No family history of thyroid cancer. Patient had breast cancer. Thyroid ultrasound done in December 2020 came back showing right lobe 3.5 cm nodule and left lobe 2.5 cm nodule which met criteria for biopsy. This nodule was biopsied and it was resulted as benign.     Past Medical History:   Diagnosis Date    Adjustment disorder with depressed mood 4/10/2017    Adrenal adenoma 4/10/2017    Anxiety disorder 4/10/2017    Arthritis     Atherosclerosis  Backache 7/31/2020    Benign essential hypertension 7/31/2020    Breast cancer ([address]     Carcinoma in situ of breast 7/31/2020    Cataract 4/10/2017    Chronic kidney disease, stage 3 (moderate) 9/5/2019    Coronary arteriosclerosis 4/10/2017    Dehydration 7/31/2020    Depressive disorder 4/10/2017    Dyslipidemia 4/10/2017    Dyspnea 7/31/2020    Gastroesophageal reflux disease 4/10/2017    Gout     Heart disease     Heartburn     Hirsutism 4/10/2017    History of malignant neoplasm of breast 7/31/2020    HTN (hypertension)     Hyperkalemia 4/10/2017    Hyperlipidemia     Hypertensive disorder 7/31/2020    Hypertensive renal disease 7/31/2020    Hyperthyroidism     Insomnia 4/10/2017    Kidney disease     Lesion of skin of face 4/10/2017    Mixed hyperlipidemia 9/5/2019    Mixed hyperlipidemia due to type 2 diabetes mellitus ([address] 7/31/2020    Subcutaneous mass of neck 3/25/2019    Thyroid disease     Thyroid nodule 9/5/2019    Tobacco user 4/10/2017    Type 2 diabetes mellitus with hyperglycemia ([address] 9/5/2019    Varicose vein of leg     Vitamin D deficiency 4/10/2017     Past Surgical History:   Procedure Laterality Date    HX BREAST BIOPSY  08/18/2011    BIOPSY/REMOVAL LYMPH NODES    HX CATARACT REMOVAL  11/22/2016    HX OTHER SURGICAL  01/01/2004    Neck surgery    HX PARTIAL HYSTERECTOMY  01/01/1980    HX TONSILLECTOMY  01/01/1950    IR FINE NEEDLE ASPIRATION THYROID  12/21/2020    IR FINE NEEDLE ASPIRATION THYROID  12/21/2020    MS BREAST SURGERY PROCEDURE UNLISTED Right 01/20/2014    Drainage of fluid    MS BREAST SURGERY PROCEDURE UNLISTED  04/03/2013    BIOPSY BREAST PERCUT W/IMAGE    MS BREAST SURGERY PROCEDURE UNLISTED  07/26/2012    PLACE BREAST CLIP PERCUT    MS BREAST SURGERY PROCEDURE UNLISTED  07/26/2012    BIOPSY BREAST PERCUT W/DEVICE    MS BREAST SURGERY PROCEDURE UNLISTED  08/18/2011    BIOPSY BREAST PERCUT W/IMAGE    MS BREAST SURGERY PROCEDURE UNLISTED  08/11/2011    PLACE BREAST CLIP PERCUT    CT BREAST SURGERY PROCEDURE UNLISTED  08/11/2011    BIOPSY BREAST PERCUT W/DEVICE      Family History   Problem Relation Age of Onset    Gall Bladder Disease Mother     Diabetes Sister     Headache Sister     Hypertension Sister     Lung Disease Sister     Migraines Sister     Alcohol abuse Brother     Cancer Brother     Hypertension Brother     Alcohol abuse Maternal Aunt     Cancer Maternal Aunt     Diabetes Maternal Aunt     Alcohol abuse Maternal Uncle     Alcohol abuse Paternal Uncle     Diabetes Maternal Grandmother     Heart Disease Maternal Grandmother     Hypertension Maternal Grandmother     Asthma Child     Prostate Cancer Father      Social History     Tobacco Use    Smoking status: Current Every Day Smoker     Packs/day: 0.50     Years: 60.00     Pack years: 30.00    Smokeless tobacco: Never Used    Tobacco comment: 1/2 pack per day. Substance Use Topics    Alcohol use: No      Prior to Admission medications    Medication Sig Start Date End Date Taking? Authorizing Provider   FLUoxetine (PROzac) 20 mg capsule  1/20/21  Yes Provider, Historical   cyanocobalamin (Vitamin B-12) 1,000 mcg tablet Take 1,000 mcg by mouth daily. Yes Provider, Historical   linaGLIPtin (Tradjenta) 5 mg tablet Take 1 tablet by mouth once daily for 30 days 2/17/21  Yes Idalia Watson MD   glipiZIDE (GLUCOTROL) 5 mg tablet Take 1 Tab by mouth two (2) times a day for 90 days.  Take 20 mins before meals 2/17/21 5/18/21 Yes Idalia Watson MD   glucose blood VI test strips (OneTouch Verio test strips) strip Use to check glucose once a day 2/17/21  Yes Idalia Watson MD   lancets (One Touch Delica) 33 gauge misc USE 1  TO CHECK GLUCOSE TWICE DAILY  DX: E11.9 NPI: [id number] 2/17/21  Yes Idalia Watson MD   omeprazole (PRILOSEC) 40 mg capsule Take 1 capsule by mouth once daily in the morning 1/18/21  Yes Shun Hudson MD   Colcrys 0.6 mg tablet Take 1 tablet by mouth once daily 1/10/21  Yes Caro Tripathi MD   methIMAzole (TAPAZOLE) 5 mg tablet Take half tablet every day, 90 days 1/7/21  Yes Lorena Bob MD   sodium bicarbonate 650 mg tablet TAKE 2 TABLETS BY MOUTH TWICE DAILY 11/13/20  Yes Provider, Historical   letrozole [facility]) 2.5 mg tablet Take 1 tablet by mouth once daily 1/1/21  Yes Caro Tripathi MD   allopurinoL (ZYLOPRIM) 100 mg tablet Take 2 tablets by mouth once daily 12/2/20  Yes Caro Tripathi MD   albuterol (Ventolin HFA) 90 mcg/actuation inhaler Take 2 Puffs by inhalation every four (4) hours as needed for Wheezing. 10/22/20  Yes Caro Tripathi MD   amLODIPine (NORVASC) 5 mg tablet Take 1 Tab by mouth daily. 10/22/20  Yes Caro Tripathi MD   ALPRAZolam Alvia Blessing) 0.5 mg tablet Take 1 Tab by mouth two (2) times daily as needed for Anxiety or Sleep. Max Daily Amount: 1 mg. 10/22/20  Yes Caro Tripathi MD   cholecalciferol, vitamin D3, (Vitamin D3) 50 mcg (2,000 unit) tab Take  by mouth. Yes Provider, Historical   diclofenac (VOLTAREN) 1 % gel Apply 2 g to affected area four (4) times daily. Yes Provider, Historical   OneTouch Verio Flex meter misc USE TO CHECK BLOOD SUGAR TWICE A DAY   Yes Provider, Historical   metoprolol succinate (TOPROL-XL) 25 mg XL tablet TAKE 1 2 (ONE HALF) TABLET BY MOUTH ONCE DAILY 9/26/19  Yes Provider, Historical   turmeric root extract 500 mg cap Take 500 mg by mouth daily. Yes Provider, Historical   vitamin E (AQUA GEMS) 400 unit capsule Take 400 Units by mouth daily. Yes Provider, Historical   peg 400-propylene glycol (SYSTANE, PROPYLENE GLYCOL,) 0.4-0.3 % drop as needed. Yes Provider, Historical   multivit-minerals/folic acid (ADULT MULTIVITAMIN GUMMIES PO) Take 2 Tabs by mouth daily. Yes Provider, Historical   azelastine (ASTEPRO) 0.15 % (205.5 mcg) two (2) times a day. Yes Provider, Historical   losartan (COZAAR) 100 mg tablet Take 100 mg by mouth daily.    Yes Provider, Historical   aspirin delayed-release 81 mg tablet Take 81 mg by mouth daily. Yes Provider, Historical   atorvastatin (LIPITOR) 40 mg tablet Take 40 mg by mouth daily. Yes Provider, Historical        Allergies   Allergen Reactions    Iodinated Contrast Media Anaphylaxis    Codeine Itching       Review of Systems:  ROS    A comprehensive review of systems was preformed and it is negative except mentioned in HPI    Objective:     Vitals:    02/17/21 1440   BP: 106/81   Pulse: 92   Temp: (!) 96.1 °F (35.6 °C)   TempSrc: Temporal   SpO2: 98%   Weight: 192 lb 3.2 oz (87.2 kg)   Height: 5' 4.5\" (1.638 m)        Physical Exam:    Physical Exam  Vitals signs and nursing note reviewed. Constitutional:       Appearance: Normal appearance. HENT:      Head: Normocephalic and atraumatic. Cardiovascular:      Rate and Rhythm: Normal rate and regular rhythm. Pulmonary:      Effort: Pulmonary effort is normal.      Breath sounds: Normal breath sounds. Neurological:      Mental Status: She is alert. Labs and Imaging:  Results for Macrina Charles (MRN [id number]) as of 2/17/2021 14:51   Ref. Range 9/18/2017 00:00 8/11/2020 13:43 10/21/2020 12:29 1/6/2021 14:18   T4, Free Latest Ref Range: 0.82 - 1.77 ng/dL 0.51 (L) 0.81 (L) 0.86 0.69 (L)   TSH Latest Ref Range: 0.450 - 4.500 uIU/mL 1.800  0.788 4.350       Thyroid US 12-:  FINDINGS: Thyroid ultrasound. Right lobe 3.4 x 2.5 x 4.5 cm. Left lobe enlarged  5.3 x 2.7 x 3.0 cm. Isthmus thickened 0.6 cm.     Right lobe complex cyst versus solid nodule measures approximately 3.5 x 1.8 x  2.8 cm. TI-RADS 3. Recommend FNA.     Left lobe contains multiple nodules:  Left upper lobe anterior isoechoic well-defined 1.7 x 1.2 x by 1.4 cm. It is  difficult to tell if this nodule has microcalcifications. TI-RADS 3-4. Consider  FNA. Left lobe posterior complex cystic and solid 2.5 x 1.5 x 1.6 cm equivocally  taller rather than wide. TI-RADS 3-4.  Recommend FNA.  Anterior complex cyst 1.5 x 1.2 x 2.3 cm. TI-RADS 3. Recommend annual  sonographic follow-up.   Last 3 Recorded Weights in this Encounter    02/17/21 1440   Weight: 192 lb 3.2 oz (87.2 kg)        Lab Results   Component Value Date/Time    Hemoglobin A1c, External 6.7 03/04/2020    Hemoglobin A1c, External 6.8 01/26/2018        Assessment:     Patient Active Problem List   Diagnosis Code    Chronic tension headache G44.229    Medication overuse headache G44.40    Chronic non-specific white matter lesions on MRI G93.9    Mild cerebral atrophy (HCC) G31.9    Subclinical hyperthyroidism E05.90    Coarse tremors G25.2    Severe obesity (HCC) E66.01    Adjustment disorder with depressed mood F43.21    Adrenal adenoma D35.00    Anxiety disorder F41.9    Cataract H26.9    Stage 3 chronic kidney disease N18.30    Coronary arteriosclerosis I25.10    Depressive disorder F32.9    Dyslipidemia E78.5    Gastroesophageal reflux disease K21.9    Hirsutism L68.0    Hyperkalemia E87.5    Insomnia G47.00    Mixed hyperlipidemia E78.2    Thyroid nodule E04.1    Type 2 diabetes mellitus with hyperglycemia (HCC) E11.65    Vitamin D deficiency E55.9    Tobacco user Z72.0    Lesion of skin of face L98.9    Subcutaneous mass of neck R22.1    Backache M54.9    Benign essential hypertension I10    Carcinoma in situ of breast D05.90    Dehydration E86.0    Dyspnea R06.00    History of malignant neoplasm of breast Z85.3    Hypertensive disorder I10    Hypertensive renal disease I12.9    Mixed hyperlipidemia due to type 2 diabetes mellitus (HCC) E11.69, E78.2    Heartburn R12    Kidney disease N28.9    Headache disorder R51.9    Gout M10.9    Tremor R25.1    Sialoadenitis of submandibular gland K11.20    Polyp of colon K63.5    Type 2 diabetes mellitus with hyperglycemia, without long-term current use of insulin (HCC) E11.65    Multinodular goiter E04.2           Plan:     type II diabetes mellitus uncontrolled  Hemoglobin A1c was 7% in April 2019, 6.5% on 9-5-2019, 6.3% on 12-5-2019, 6.7% in 3-2020, 6.7% on 12-2-2020    Fingerstick blood glucose is 121 mg/dL in my office today. Up to date with diabetes related annual labs: yes    Up to date with diabetic eye exam: yes    plan: Although glucose control is not adequate, avoiding hypoglycemia is important. Continue Tradjenta 5 mg daily, glipizide 5 mg twice a day. Advised patient to check blood glucose next time when she gets a headache, to make sure headaches and not because of hypoglycemia which she is not recording. Advised patient to check at [facility] to see if they still have a diabetic educator/dietitian there. Patient is not able to drive to [place] or [facility]. Gave some written instructions to patient about diabetic meal planning. hypertensive renal disease  blood pressure well controlled on current medications. chronic kidney disease stage 3  GFR 38 in August 2019. Closely following with nephrology. hyperthyroidism  on methimazole 2.5 mg daily. TSH was normal in August 2019 and October 2020, free T4 was low. However, methimazole was increased to 5 mg daily in November 2020.  16-2021:  TSH normal at 4.35  Free T4 low at 0.69  Plan:  Decrease methimazole to 2.5 mg daily. I will recheck TSH and free T4 in a few months. mixed hyperlipidemia  LDL was 66 in April 2019. Continue atorvastatin 40 mg daily. thyroid nodule  Thyroid ultrasound 12-: Right lobe 3.5 and left lobe 2.5 cm TR 4 nodules meet criteria for biopsy. Thyroid biopsy of these nodules on 12- were resulted as benign. Plan:  Repeat ultrasound in 1 year. history of malignant neoplasm of breast  status post surgery, radiation. Now on letrozole.   Orders Placed This Encounter    AMB POC GLUCOSE BLOOD, BY GLUCOSE MONITORING DEVICE    linaGLIPtin (Tradjenta) 5 mg tablet     Sig: Take 1 tablet by mouth once daily for 30 days     Dispense:  30 Tab     Refill:  4    glipiZIDE (GLUCOTROL) 5 mg tablet     Sig: Take 1 Tab by mouth two (2) times a day for 90 days.  Take 20 mins before meals     Dispense:  180 Tab     Refill:  1    glucose blood VI test strips (OneTouch Verio test strips) strip     Sig: Use to check glucose once a day     Dispense:  50 Strip     Refill:  5    lancets (One Touch Delica) 33 gauge misc     Sig: USE 1  TO CHECK GLUCOSE TWICE DAILY  DX: E11.9 NPI: [id number]     Dispense:  180 Lancet     Refill:  1        Signed By: Shaun Urban MD     February 17, 2021      Return to clinic 2 months

## 2021-02-17 NOTE — PATIENT INSTRUCTIONS
Limit yourself to one starch per meal (bread/rice/potato/sweet potato/corn/pasta). Avoid bananas, grapes, watermelon, pineapple, fruit juice, sweet tea, soda, fruit punch, fruit cups. Please check your glucose when you get headaches. Call me if you see readings < 80. Call [facility] to check about diabetes educator/dietician.

## 2021-02-20 DIAGNOSIS — E05.90 HYPERTHYROIDISM: ICD-10-CM

## 2021-02-21 RX ORDER — ALLOPURINOL 100 MG/1
TABLET ORAL
Qty: 180 TAB | Refills: 0 | Status: SHIPPED | OUTPATIENT
Start: 2021-02-21 | End: 2021-06-06

## 2021-02-26 DIAGNOSIS — F43.21 ADJUSTMENT DISORDER WITH DEPRESSED MOOD: ICD-10-CM

## 2021-03-01 RX ORDER — ALPRAZOLAM 0.5 MG/1
TABLET ORAL
Qty: 60 TAB | Refills: 0 | Status: SHIPPED | OUTPATIENT
Start: 2021-03-01 | End: 2021-07-21 | Stop reason: ALTCHOICE

## 2021-03-09 DIAGNOSIS — F43.21 ADJUSTMENT DISORDER WITH DEPRESSED MOOD: ICD-10-CM

## 2021-03-10 RX ORDER — ALPRAZOLAM 0.5 MG/1
TABLET ORAL
Qty: 60 TAB | Refills: 0 | OUTPATIENT
Start: 2021-03-10

## 2021-03-12 NOTE — TELEPHONE ENCOUNTER
[address] and spoke with Pharmacy [facility]y and informed her that patient is no longer under Dr. Garcia Gip rosalino and to discontinue any RX's that have his name on them. She stated she would let the Pharmacist know.

## 2021-04-21 ENCOUNTER — OFFICE VISIT (OUTPATIENT)
Dept: ENDOCRINOLOGY | Age: 76
End: 2021-04-21
Payer: MEDICARE

## 2021-04-21 VITALS
SYSTOLIC BLOOD PRESSURE: 138 MMHG | OXYGEN SATURATION: 95 % | TEMPERATURE: 98.1 F | HEART RATE: 91 BPM | HEIGHT: 65 IN | BODY MASS INDEX: 31.19 KG/M2 | WEIGHT: 187.2 LBS | DIASTOLIC BLOOD PRESSURE: 87 MMHG

## 2021-04-21 DIAGNOSIS — E11.65 TYPE 2 DIABETES MELLITUS WITH HYPERGLYCEMIA, WITHOUT LONG-TERM CURRENT USE OF INSULIN (HCC): Primary | ICD-10-CM

## 2021-04-21 DIAGNOSIS — E05.90 SUBCLINICAL HYPERTHYROIDISM: ICD-10-CM

## 2021-04-21 DIAGNOSIS — E11.65 TYPE 2 DIABETES MELLITUS WITH HYPERGLYCEMIA, UNSPECIFIED WHETHER LONG TERM INSULIN USE (HCC): ICD-10-CM

## 2021-04-21 LAB
GLUCOSE POC: 156 MG/DL
HBA1C MFR BLD HPLC: 6.4 %

## 2021-04-21 PROCEDURE — 3288F FALL RISK ASSESSMENT DOCD: CPT | Performed by: INTERNAL MEDICINE

## 2021-04-21 PROCEDURE — G8427 DOCREV CUR MEDS BY ELIG CLIN: HCPCS | Performed by: INTERNAL MEDICINE

## 2021-04-21 PROCEDURE — 3017F COLORECTAL CA SCREEN DOC REV: CPT | Performed by: INTERNAL MEDICINE

## 2021-04-21 PROCEDURE — G8417 CALC BMI ABV UP PARAM F/U: HCPCS | Performed by: INTERNAL MEDICINE

## 2021-04-21 PROCEDURE — 1090F PRES/ABSN URINE INCON ASSESS: CPT | Performed by: INTERNAL MEDICINE

## 2021-04-21 PROCEDURE — 3044F HG A1C LEVEL LT 7.0%: CPT | Performed by: INTERNAL MEDICINE

## 2021-04-21 PROCEDURE — 82962 GLUCOSE BLOOD TEST: CPT | Performed by: INTERNAL MEDICINE

## 2021-04-21 PROCEDURE — 2022F DILAT RTA XM EVC RTNOPTHY: CPT | Performed by: INTERNAL MEDICINE

## 2021-04-21 PROCEDURE — G9717 DOC PT DX DEP/BP F/U NT REQ: HCPCS | Performed by: INTERNAL MEDICINE

## 2021-04-21 PROCEDURE — 99214 OFFICE O/P EST MOD 30 MIN: CPT | Performed by: INTERNAL MEDICINE

## 2021-04-21 PROCEDURE — G8400 PT W/DXA NO RESULTS DOC: HCPCS | Performed by: INTERNAL MEDICINE

## 2021-04-21 PROCEDURE — G8754 DIAS BP LESS 90: HCPCS | Performed by: INTERNAL MEDICINE

## 2021-04-21 PROCEDURE — 1100F PTFALLS ASSESS-DOCD GE2>/YR: CPT | Performed by: INTERNAL MEDICINE

## 2021-04-21 PROCEDURE — G8752 SYS BP LESS 140: HCPCS | Performed by: INTERNAL MEDICINE

## 2021-04-21 PROCEDURE — 83036 HEMOGLOBIN GLYCOSYLATED A1C: CPT | Performed by: INTERNAL MEDICINE

## 2021-04-21 PROCEDURE — G8536 NO DOC ELDER MAL SCRN: HCPCS | Performed by: INTERNAL MEDICINE

## 2021-04-21 RX ORDER — BLOOD SUGAR DIAGNOSTIC
STRIP MISCELLANEOUS
Qty: 50 STRIP | Refills: 5 | Status: SHIPPED | OUTPATIENT
Start: 2021-04-21 | End: 2021-07-21 | Stop reason: SDUPTHER

## 2021-04-21 RX ORDER — LANCETS 33 GAUGE
EACH MISCELLANEOUS
Qty: 180 LANCET | Refills: 1 | Status: SHIPPED | OUTPATIENT
Start: 2021-04-21 | End: 2021-07-21 | Stop reason: SDUPTHER

## 2021-04-21 RX ORDER — LINAGLIPTIN 5 MG/1
TABLET, FILM COATED ORAL
Qty: 30 TAB | Refills: 4 | Status: SHIPPED | OUTPATIENT
Start: 2021-04-21 | End: 2021-07-21 | Stop reason: SDUPTHER

## 2021-04-21 NOTE — LETTER
4/21/2021 Patient: Kiran Louis YOB: 1945 Date of Visit: 4/21/2021 Johnathon Mena MD 
[address] [place] [id number] [address] Fax: [phone] Dear Johnathon Mena MD, Thank you for referring Ms. Kiran Louis to [address] [place] [address] for evaluation. My notes for this consultation are attached. If you have questions, please do not hesitate to call me. I look forward to following your patient along with you. Sincerely, Ayaan Watson MD

## 2021-04-21 NOTE — PROGRESS NOTES
History and Physical    Patient: Jason Jackson MRN: [id number]  SSN: [id number]    YOB: 1945  Age: 76 y.o. Sex: female      Subjective:      Jason Jackson is a 76 y.o. female with past medical history of hypertension, hyperlipidemia, chronic kidney disease stage III, depression, breast cancer status post surgery, radiation is sent to me by primary care physician Dr. Johnna Alfaro for type 2 diabetes mellitus. patient is a poor historian. The last visit we continue Tradjenta 5 mg daily and glipizide 5 mg twice a day was started. Patient is taking this regularly. She has not made any changes in her eating habits. Patient is interested in getting diabetic diet education but she is unable to go anywhere except [place] for this, and no resources are available in [facility]. She continues to have headaches, wakes up with a headache most mornings. She also has been feeling more shaky. Not sure if she checks her blood glucose when this happens. She checks blood glucose once a day, did not bring her glucometer today. Glucometer reading: Did not bring glucometer today    uupdated diabetes history:  · Diagnosis: 4 years    · Current treatment: Tradjenta 5 mg daily, glipizide 5 mg twice a day    · Past treatment: glimepiride (hypoglycemia,, chronic kidney disease stage III)    · Glucose checks: once a day    · Hyperglycemia: no    · Hypoglycemia: no    · Meals per day: 2, brunch: cheese, cereal etc, dinner: cabbage, or fast food, snacks: crackers     · Exercise: no    · DM related hospitalizations: no        Complications of DM:    · CAD: no    · CVA: no    · PVD: no    · Amputations: no     · Retinopathy: no; last exam was 2/2019    · Gastropathy: no    · Nephropathy: yes    · Neuropathy: no        Medications:    · Statin: atorvastatin    · ACE-I: losartan    · ASA: yes        · Diabetes education: no    Subclinical hyperthyroidism:  Initially noted in labs in September 2016.   Started on methimazole 2.5 mg daily in June 2017. Patient was previously seeing endocrinologist Dr. Stephane Haro for this. Patient tells me that recently methimazole dose was increased from 2.5 mg to 5 mg. Patient does not know why this was done but she tells she has been taking this for less than 1 month. Energy level is poor, which is not new, sleep is poor, again not new. Bowel movements regular, no heat or cold intolerance, no palpitations. Labs done after the last visit came back showing low free T4 and normal TSH. At that time methimazole was decreased back to 2.5 mg daily. However, patient has not noticed any change in her energy level or sleep. Continues to have poor sleep. Multinodular goiter:  Thyroid ultrasound August 2019:  Right lobe has 4.5 x 2.4 x 4 cm complex cyst    Left lobe has 1.2 x 0.9 x 1 cm solid nodule in the upper part,  1.6 x 1.7 x 1.1 cm cystic and solid nodule in the mid region  1.8 x 1.3 x 2.1 cm solid nodule in the inferior region    Right lobe complex nodule and left lobe 2.1 cm nodules were biopsied in October 2019. Right lobe nodule resulted as benign, left lobe nodule insufficient cells. Patient denies any difficulty in swallowing but she feels like the nodules in her neck are increasing in size. No family history of thyroid cancer. Patient had breast cancer. Thyroid ultrasound done in December 2020 came back showing right lobe 3.5 cm nodule and left lobe 2.5 cm nodule which met criteria for biopsy. This nodule was biopsied and it was resulted as benign.     Past Medical History:   Diagnosis Date    Adjustment disorder with depressed mood 4/10/2017    Adrenal adenoma 4/10/2017    Anxiety disorder 4/10/2017    Arthritis     Atherosclerosis     Backache 7/31/2020    Benign essential hypertension 7/31/2020    Breast cancer [facility] [address]     Carcinoma in situ of breast 7/31/2020    Cataract 4/10/2017    Chronic kidney disease, stage 3 (moderate) 9/5/2019    Coronary arteriosclerosis 4/10/2017    Dehydration 7/31/2020    Depressive disorder 4/10/2017    Dyslipidemia 4/10/2017    Dyspnea 7/31/2020    Gastroesophageal reflux disease 4/10/2017    Gout     Heart disease     Heartburn     Hirsutism 4/10/2017    History of malignant neoplasm of breast 7/31/2020    HTN (hypertension)     Hyperkalemia 4/10/2017    Hyperlipidemia     Hypertensive disorder 7/31/2020    Hypertensive renal disease 7/31/2020    Hyperthyroidism     Insomnia 4/10/2017    Kidney disease     Lesion of skin of face 4/10/2017    Mixed hyperlipidemia 9/5/2019    Mixed hyperlipidemia due to type 2 diabetes mellitus ([address] 7/31/2020    Subcutaneous mass of neck 3/25/2019    Thyroid disease     Thyroid nodule 9/5/2019    Tobacco user 4/10/2017    Type 2 diabetes mellitus with hyperglycemia ([address] 9/5/2019    Varicose vein of leg     Vitamin D deficiency 4/10/2017     Past Surgical History:   Procedure Laterality Date    HX BREAST BIOPSY  08/18/2011    BIOPSY/REMOVAL LYMPH NODES    HX CATARACT REMOVAL  11/22/2016    HX OTHER SURGICAL  01/01/2004    Neck surgery    HX PARTIAL HYSTERECTOMY  01/01/1980    HX TONSILLECTOMY  01/01/1950    IR FINE NEEDLE ASPIRATION THYROID  12/21/2020    IR FINE NEEDLE ASPIRATION THYROID  12/21/2020    [place] BREAST SURGERY PROCEDURE UNLISTED Right 01/20/2014    Drainage of fluid    [place] BREAST SURGERY PROCEDURE UNLISTED  04/03/2013    BIOPSY BREAST PERCUT W/IMAGE    [place] BREAST SURGERY PROCEDURE UNLISTED  07/26/2012    PLACE BREAST CLIP PERCUT    [place] BREAST SURGERY PROCEDURE UNLISTED  07/26/2012    BIOPSY BREAST PERCUT W/DEVICE    [place] BREAST SURGERY PROCEDURE UNLISTED  08/18/2011    BIOPSY BREAST PERCUT W/IMAGE    [place] BREAST SURGERY PROCEDURE UNLISTED  08/11/2011    PLACE BREAST CLIP PERCUT    [place] BREAST SURGERY PROCEDURE UNLISTED  08/11/2011    BIOPSY BREAST PERCUT W/DEVICE      Family History   Problem Relation Age of Onset    Gall Bladder Disease Mother    [facility] Diabetes Sister     Headache Sister     Hypertension Sister     Lung Disease Sister    Zoe Jaeger Migraines Sister     Alcohol abuse Brother     Cancer Brother     Hypertension Brother     Alcohol abuse Maternal Aunt     Cancer Maternal Aunt     Diabetes Maternal Aunt     Alcohol abuse Maternal Uncle     Alcohol abuse Paternal Uncle     Diabetes Maternal Grandmother     Heart Disease Maternal Grandmother     Hypertension Maternal Grandmother     Asthma Child     Prostate Cancer Father      Social History     Tobacco Use    Smoking status: Current Every Day Smoker     Packs/day: 0.50     Years: 60.00     Pack years: 30.00    Smokeless tobacco: Never Used    Tobacco comment: 1/2 pack per day. Substance Use Topics    Alcohol use: No      Prior to Admission medications    Medication Sig Start Date End Date Taking? Authorizing Provider   linaGLIPtin (Tradjenta) 5 mg tablet Take 1 tablet by mouth once daily for 30 days 4/21/21  Yes Nayana Brown MD   glucose blood VI test strips (OneTouch Verio test strips) strip Use to check glucose once a day 4/21/21  Yes Nayana Brown MD   lancets (One Touch Delica) 33 gauge misc USE 1  TO CHECK GLUCOSE TWICE DAILY  DX: E11.9 NPI: [id number] 4/21/21  Yes Nayana Brown MD   ALPRAZolam (XANAX) 0.5 mg tablet TAKE 1 TABLET BY MOUTH TWICE DAILY AS NEEDED FOR ANXIETY OR SLEEP. *MAX  DAILY  AMOUNT IS 2 TABLETS (1MG)* 3/1/21  Yes Maegan Morel MD   allopurinoL (ZYLOPRIM) 100 mg tablet Take 2 tablets by mouth once daily 2/21/21  Yes Maegan Morel MD   FLUoxetine (PROzac) 20 mg capsule  1/20/21  Yes Provider, Historical   cyanocobalamin (Vitamin B-12) 1,000 mcg tablet Take 1,000 mcg by mouth daily.    Yes Provider, Historical   omeprazole (PRILOSEC) 40 mg capsule Take 1 capsule by mouth once daily in the morning 1/18/21  Yes Maegan Morel MD   Colcrys 0.6 mg tablet Take 1 tablet by mouth once daily 1/10/21  Yes Maegan Morel MD   methIMAzole (TAPAZOLE) 5 mg tablet Take half tablet every day, 90 days 1/7/21  Yes Reggie Mckeon MD   sodium bicarbonate 650 mg tablet TAKE 2 TABLETS BY MOUTH TWICE DAILY 11/13/20  Yes Provider, Historical   letrozole [facility]) 2.5 mg tablet Take 1 tablet by mouth once daily 1/1/21  Yes hCaparrita Lester MD   albuterol (Ventolin HFA) 90 mcg/actuation inhaler Take 2 Puffs by inhalation every four (4) hours as needed for Wheezing. 10/22/20  Yes Chaparrita Lester MD   amLODIPine (NORVASC) 5 mg tablet Take 1 Tab by mouth daily. 10/22/20  Yes Chaparrita Lester MD   cholecalciferol, vitamin D3, (Vitamin D3) 50 mcg (2,000 unit) tab Take  by mouth. Yes Provider, Historical   diclofenac (VOLTAREN) 1 % gel Apply 2 g to affected area four (4) times daily. Yes Provider, Historical   OneTouch Verio Flex meter misc USE TO CHECK BLOOD SUGAR TWICE A DAY   Yes Provider, Historical   metoprolol succinate (TOPROL-XL) 25 mg XL tablet TAKE 1 2 (ONE HALF) TABLET BY MOUTH ONCE DAILY 9/26/19  Yes Provider, Historical   turmeric root extract 500 mg cap Take 500 mg by mouth daily. Yes Provider, Historical   vitamin E (AQUA GEMS) 400 unit capsule Take 400 Units by mouth daily. Yes Provider, Historical   peg 400-propylene glycol (SYSTANE, PROPYLENE GLYCOL,) 0.4-0.3 % drop as needed. Yes Provider, Historical   multivit-minerals/folic acid (ADULT MULTIVITAMIN GUMMIES PO) Take 2 Tabs by mouth daily. Yes Provider, Historical   azelastine (ASTEPRO) 0.15 % (205.5 mcg) two (2) times a day. Yes Provider, Historical   losartan (COZAAR) 100 mg tablet Take 100 mg by mouth daily. Yes Provider, Historical   aspirin delayed-release 81 mg tablet Take 81 mg by mouth daily. Yes Provider, Historical   atorvastatin (LIPITOR) 40 mg tablet Take 40 mg by mouth daily.    Yes Provider, Historical        Allergies   Allergen Reactions    Iodinated Contrast Media Anaphylaxis    Codeine Itching       Review of Systems:  ROS    A comprehensive review of systems was preformed and it is negative except mentioned in HPI    Objective:     Vitals:    04/21/21 1101   BP: 138/87   Pulse: 91   Temp: 98.1 °F (36.7 °C)   TempSrc: Temporal   SpO2: 95%   Weight: 187 lb 3.2 oz (84.9 kg)   Height: 5' 4.5\" (1.638 m)        Physical Exam:    Physical Exam  Vitals signs and nursing note reviewed. Constitutional:       Appearance: Normal appearance. HENT:      Head: Normocephalic and atraumatic. Cardiovascular:      Rate and Rhythm: Normal rate and regular rhythm. Pulmonary:      Effort: Pulmonary effort is normal.      Breath sounds: Normal breath sounds. Neurological:      Mental Status: She is alert. Labs and Imaging:  Results for Kaley Carvajal (MRN [id number]) as of 2/17/2021 14:51   Ref. Range 9/18/2017 00:00 8/11/2020 13:43 10/21/2020 12:29 1/6/2021 14:18   T4, Free Latest Ref Range: 0.82 - 1.77 ng/dL 0.51 (L) 0.81 (L) 0.86 0.69 (L)   TSH Latest Ref Range: 0.450 - 4.500 uIU/mL 1.800  0.788 4.350       Thyroid US 12-:  FINDINGS: Thyroid ultrasound. Right lobe 3.4 x 2.5 x 4.5 cm. Left lobe enlarged  5.3 x 2.7 x 3.0 cm. Isthmus thickened 0.6 cm.     Right lobe complex cyst versus solid nodule measures approximately 3.5 x 1.8 x  2.8 cm. TI-RADS 3. Recommend FNA.     Left lobe contains multiple nodules:  Left upper lobe anterior isoechoic well-defined 1.7 x 1.2 x by 1.4 cm. It is  difficult to tell if this nodule has microcalcifications. TI-RADS 3-4. Consider  FNA. Left lobe posterior complex cystic and solid 2.5 x 1.5 x 1.6 cm equivocally  taller rather than wide. TI-RADS 3-4. Recommend FNA. Anterior complex cyst 1.5 x 1.2 x 2.3 cm. TI-RADS 3. Recommend annual  sonographic follow-up.   Last 3 Recorded Weights in this Encounter    04/21/21 1101   Weight: 187 lb 3.2 oz (84.9 kg)        Lab Results   Component Value Date/Time    Hemoglobin A1c, External 6.7 03/04/2020    Hemoglobin A1c, External 6.8 01/26/2018        Assessment:     Patient Active Problem List   Diagnosis Code    Chronic tension headache G44.229    Medication overuse headache G44.40    Chronic non-specific white matter lesions on MRI G93.9    Mild cerebral atrophy (HCC) G31.9    Subclinical hyperthyroidism E05.90    Coarse tremors G25.2    Severe obesity (HCC) E66.01    Adjustment disorder with depressed mood F43.21    Adrenal adenoma D35.00    Anxiety disorder F41.9    Cataract H26.9    Stage 3 chronic kidney disease N18.30    Coronary arteriosclerosis I25.10    Depressive disorder F32.9    Dyslipidemia E78.5    Gastroesophageal reflux disease K21.9    Hirsutism L68.0    Hyperkalemia E87.5    Insomnia G47.00    Mixed hyperlipidemia E78.2    Thyroid nodule E04.1    Type 2 diabetes mellitus with hyperglycemia (HCC) E11.65    Vitamin D deficiency E55.9    Tobacco user Z72.0    Lesion of skin of face L98.9    Subcutaneous mass of neck R22.1    Backache M54.9    Benign essential hypertension I10    Carcinoma in situ of breast D05.90    Dehydration E86.0    Dyspnea R06.00    History of malignant neoplasm of breast Z85.3    Hypertensive disorder I10    Hypertensive renal disease I12.9    Mixed hyperlipidemia due to type 2 diabetes mellitus (HCC) E11.69, E78.2    Heartburn R12    Kidney disease N28.9    Headache disorder R51.9    Gout M10.9    Tremor R25.1    Sialoadenitis of submandibular gland K11.20    Polyp of colon K63.5    Type 2 diabetes mellitus with hyperglycemia, without long-term current use of insulin ([facility]) E11.65    Multinodular goiter E04.2           Plan:     type II diabetes mellitus uncontrolled  Hemoglobin A1c was 7% in April 2019, 6.5% on 9-5-2019, 6.3% on 12-5-2019, 6.7% in 3-2020, 6.7% on 12-2-2020, 6.4% today. Fingerstick blood glucose is 156 mg/dL in my office today. Up to date with diabetes related annual labs: yes    Up to date with diabetic eye exam: No    plan:   Although glucose control is not adequate, avoiding hypoglycemia is important given her age and comorbidities. .  Looking at her hemoglobin A1c, I am wondering if she is having hypoglycemia which is manifesting as headaches and shakes. Stop all glipizide. Continue Tradjenta 5 mg daily. Check blood glucose once a day and I will see her back in 3 months. hypertensive renal disease  blood pressure well controlled on current medications. chronic kidney disease stage 3  GFR 38 in August 2019. Closely following with nephrology. hyperthyroidism  on methimazole 2.5 mg daily. TSH was normal in August 2019 and October 2020, free T4 was low. However, methimazole was increased to 5 mg daily in November 2020.  16-2021:  TSH normal at 4.35  Free T4 low at 0.69  Methimazole was decreased to 2.5 mg daily  Plan:  I will recheck TSH and free T4 today. mixed hyperlipidemia  LDL was 66 in April 2019, 53 in January 2021. Continue atorvastatin 40 mg daily. thyroid nodule  Thyroid ultrasound 12-: Right lobe 3.5 and left lobe 2.5 cm TR 4 nodules meet criteria for biopsy. Thyroid biopsy of these nodules on 12- were resulted as benign. Plan:  Repeat ultrasound in 1 year. history of malignant neoplasm of breast  status post surgery, radiation. Now on letrozole.     Orders Placed This Encounter    TSH AND FREE T4    REFERRAL TO OPHTHALMOLOGY     Referral Priority:   Routine     Referral Type:   Consultation     Referral Reason:   Specialty Services Required     Referred to Provider:   Royal Jasbir MD     Requested Specialty:   Ophthalmology     Number of Visits Requested:   1    AMB POC GLUCOSE BLOOD, BY GLUCOSE MONITORING DEVICE    AMB POC HEMOGLOBIN A1C    linaGLIPtin (Tradjenta) 5 mg tablet     Sig: Take 1 tablet by mouth once daily for 30 days     Dispense:  30 Tab     Refill:  4     DC glipizide    glucose blood VI test strips (OneTouch Verio test strips) strip     Sig: Use to check glucose once a day     Dispense:  50 Strip     Refill:  5    lancets (One Touch Delica) 33 gauge misc     Sig: USE 1  TO CHECK GLUCOSE TWICE DAILY  DX: E11.9 NPI: [id number]     Dispense:  180 Lancet     Refill:  1        Signed By: Patel De La Vega MD     April 21, 2021      Return to clinic 3 months

## 2021-04-22 LAB
T4 FREE SERPL-MCNC: 0.98 NG/DL (ref 0.82–1.77)
TSH SERPL DL<=0.005 MIU/L-ACNC: 0.53 UIU/ML (ref 0.45–4.5)

## 2021-04-23 DIAGNOSIS — E05.90 HYPERTHYROIDISM: ICD-10-CM

## 2021-04-23 RX ORDER — METHIMAZOLE 5 MG/1
TABLET ORAL
Qty: 45 TAB | Refills: 1 | Status: SHIPPED | OUTPATIENT
Start: 2021-04-23 | End: 2021-07-21 | Stop reason: SDUPTHER

## 2021-04-26 ENCOUNTER — TELEPHONE (OUTPATIENT)
Dept: ENDOCRINOLOGY | Age: 76
End: 2021-04-26

## 2021-04-26 NOTE — TELEPHONE ENCOUNTER
Left patient a vm asking to give the office a call back in regards to results    ----- Message from Patel De La Vega MD sent at 4/23/2021  2:17 PM EDT -----  Thyroid function test looks normal.  Continue methimazole 2.5 mg daily.

## 2021-04-29 ENCOUNTER — TELEPHONE (OUTPATIENT)
Dept: ENDOCRINOLOGY | Age: 76
End: 2021-04-29

## 2021-04-29 NOTE — TELEPHONE ENCOUNTER
Patient notified    ----- Message from Ion Polo MD sent at 4/23/2021  2:17 PM EDT -----  Thyroid function test looks normal.  Continue methimazole 2.5 mg daily.

## 2021-05-10 ENCOUNTER — TRANSCRIBE ORDER (OUTPATIENT)
Dept: REGISTRATION | Age: 76
End: 2021-05-10

## 2021-05-10 ENCOUNTER — HOSPITAL ENCOUNTER (OUTPATIENT)
Dept: LAB | Age: 76
Discharge: HOME | End: 2021-05-10
Payer: MEDICARE

## 2021-05-10 DIAGNOSIS — E05.90 PRETIBIAL MYXEDEMA: ICD-10-CM

## 2021-05-10 DIAGNOSIS — I10 ESSENTIAL HYPERTENSION, MALIGNANT: ICD-10-CM

## 2021-05-10 DIAGNOSIS — N18.30 CHRONIC KIDNEY DISEASE, STAGE III (MODERATE) (HCC): Primary | ICD-10-CM

## 2021-05-10 DIAGNOSIS — N18.30 CHRONIC KIDNEY DISEASE, STAGE III (MODERATE) (HCC): ICD-10-CM

## 2021-05-10 LAB
ALBUMIN SERPL-MCNC: 3.1 G/DL (ref 3.5–5)
ANION GAP SERPL CALC-SCNC: 8 MMOL/L (ref 5–15)
APPEARANCE UR: CLEAR
BACTERIA URNS QL MICRO: NEGATIVE /HPF
BASOPHILS # BLD: 0.1 K/UL (ref 0–0.2)
BASOPHILS NFR BLD: 1 % (ref 0–2.5)
BILIRUB UR QL: NEGATIVE
BUN SERPL-MCNC: 26 MG/DL (ref 6–20)
BUN/CREAT SERPL: 16 (ref 12–20)
CA-I BLD-MCNC: 9.2 MG/DL (ref 8.5–10.1)
CHLORIDE SERPL-SCNC: 104 MMOL/L (ref 97–108)
CO2 SERPL-SCNC: 29 MMOL/L (ref 21–32)
COLOR UR: ABNORMAL
CREAT SERPL-MCNC: 1.65 MG/DL (ref 0.55–1.02)
CREAT UR-MCNC: 188.88 MG/DL
EOSINOPHIL # BLD: 0.1 K/UL (ref 0–0.7)
EOSINOPHIL NFR BLD: 1 % (ref 0.9–2.9)
ERYTHROCYTE [DISTWIDTH] IN BLOOD BY AUTOMATED COUNT: 14.7 % (ref 11.5–14.5)
GLUCOSE SERPL-MCNC: 255 MG/DL (ref 65–100)
GLUCOSE UR STRIP.AUTO-MCNC: NEGATIVE MG/DL
HCT VFR BLD AUTO: 44.3 % (ref 36–46)
HGB BLD-MCNC: 14.9 G/DL (ref 13.5–17.5)
HGB UR QL STRIP: NEGATIVE
KETONES UR QL STRIP.AUTO: NEGATIVE MG/DL
LEUKOCYTE ESTERASE UR QL STRIP.AUTO: NEGATIVE
LYMPHOCYTES # BLD: 2 K/UL (ref 1–4.8)
LYMPHOCYTES NFR BLD: 22 % (ref 20.5–51.1)
MCH RBC QN AUTO: 30.6 PG (ref 31–34)
MCHC RBC AUTO-ENTMCNC: 33.8 G/DL (ref 31–36)
MCV RBC AUTO: 90.6 FL (ref 80–100)
MONOCYTES # BLD: 0.5 K/UL (ref 0.2–2.4)
MONOCYTES NFR BLD: 6 % (ref 1.7–9.3)
NEUTS SEG # BLD: 6.6 K/UL (ref 1.8–7.7)
NEUTS SEG NFR BLD: 70 % (ref 42–75)
NITRITE UR QL STRIP.AUTO: NEGATIVE
NRBC # BLD: 0 K/UL
NRBC BLD-RTO: 0 PER 100 WBC
PH UR STRIP: 6 [PH] (ref 5–8)
PHOSPHATE SERPL-MCNC: 3.2 MG/DL (ref 2.6–4.7)
PLATELET # BLD AUTO: 272 K/UL (ref 150–400)
PMV BLD AUTO: 8.5 FL (ref 6.5–11.5)
POTASSIUM SERPL-SCNC: 4.1 MMOL/L (ref 3.5–5.1)
PROT UR STRIP-MCNC: 100 MG/DL
PROT UR-MCNC: 114 MG/DL (ref 0–11.9)
PROT/CREAT UR-RTO: 0.6
RBC # BLD AUTO: 4.89 M/UL (ref 4.5–5.9)
RBC #/AREA URNS HPF: ABNORMAL /HPF (ref 0–3)
SODIUM SERPL-SCNC: 141 MMOL/L (ref 136–145)
SP GR UR REFRACTOMETRY: 1.02 (ref 1–1.03)
UROBILINOGEN UR QL STRIP.AUTO: 0.2 EU/DL (ref 0.2–1)
WBC # BLD AUTO: 9.3 K/UL (ref 4.4–11.3)
WBC URNS QL MICRO: ABNORMAL /HPF (ref 0–5)

## 2021-05-10 PROCEDURE — 84156 ASSAY OF PROTEIN URINE: CPT

## 2021-05-10 PROCEDURE — 83970 ASSAY OF PARATHORMONE: CPT

## 2021-05-10 PROCEDURE — 81001 URINALYSIS AUTO W/SCOPE: CPT

## 2021-05-10 PROCEDURE — 80069 RENAL FUNCTION PANEL: CPT

## 2021-05-10 PROCEDURE — 82306 VITAMIN D 25 HYDROXY: CPT

## 2021-05-10 PROCEDURE — 85025 COMPLETE CBC W/AUTO DIFF WBC: CPT

## 2021-05-11 LAB
25(OH)D3 SERPL-MCNC: 39 NG/ML (ref 30–100)
CA-I BLD-MCNC: 8.9 MG/DL (ref 8.5–10.1)
PTH-INTACT SERPL-MCNC: 54.6 PG/ML (ref 18.4–88)

## 2021-06-01 DIAGNOSIS — E05.90 HYPERTHYROIDISM: ICD-10-CM

## 2021-06-06 RX ORDER — ALLOPURINOL 100 MG/1
TABLET ORAL
Qty: 180 TABLET | Refills: 0 | Status: SHIPPED | OUTPATIENT
Start: 2021-06-06

## 2021-07-21 ENCOUNTER — OFFICE VISIT (OUTPATIENT)
Dept: ENDOCRINOLOGY | Age: 76
End: 2021-07-21
Payer: MEDICARE

## 2021-07-21 VITALS
SYSTOLIC BLOOD PRESSURE: 122 MMHG | BODY MASS INDEX: 30.52 KG/M2 | HEART RATE: 71 BPM | OXYGEN SATURATION: 96 % | DIASTOLIC BLOOD PRESSURE: 72 MMHG | HEIGHT: 65 IN | WEIGHT: 183.2 LBS | TEMPERATURE: 98 F

## 2021-07-21 DIAGNOSIS — E05.90 HYPERTHYROIDISM: ICD-10-CM

## 2021-07-21 DIAGNOSIS — E11.65 TYPE 2 DIABETES MELLITUS WITH HYPERGLYCEMIA, WITHOUT LONG-TERM CURRENT USE OF INSULIN (HCC): Primary | ICD-10-CM

## 2021-07-21 DIAGNOSIS — E11.65 TYPE 2 DIABETES MELLITUS WITH HYPERGLYCEMIA, UNSPECIFIED WHETHER LONG TERM INSULIN USE (HCC): ICD-10-CM

## 2021-07-21 LAB
GLUCOSE POC: 145 MG/DL
HBA1C MFR BLD HPLC: 6.4 %

## 2021-07-21 PROCEDURE — 99214 OFFICE O/P EST MOD 30 MIN: CPT | Performed by: INTERNAL MEDICINE

## 2021-07-21 PROCEDURE — G8536 NO DOC ELDER MAL SCRN: HCPCS | Performed by: INTERNAL MEDICINE

## 2021-07-21 PROCEDURE — G8752 SYS BP LESS 140: HCPCS | Performed by: INTERNAL MEDICINE

## 2021-07-21 PROCEDURE — G8427 DOCREV CUR MEDS BY ELIG CLIN: HCPCS | Performed by: INTERNAL MEDICINE

## 2021-07-21 PROCEDURE — 3288F FALL RISK ASSESSMENT DOCD: CPT | Performed by: INTERNAL MEDICINE

## 2021-07-21 PROCEDURE — G8417 CALC BMI ABV UP PARAM F/U: HCPCS | Performed by: INTERNAL MEDICINE

## 2021-07-21 PROCEDURE — 1100F PTFALLS ASSESS-DOCD GE2>/YR: CPT | Performed by: INTERNAL MEDICINE

## 2021-07-21 PROCEDURE — G8400 PT W/DXA NO RESULTS DOC: HCPCS | Performed by: INTERNAL MEDICINE

## 2021-07-21 PROCEDURE — G8754 DIAS BP LESS 90: HCPCS | Performed by: INTERNAL MEDICINE

## 2021-07-21 PROCEDURE — 82962 GLUCOSE BLOOD TEST: CPT | Performed by: INTERNAL MEDICINE

## 2021-07-21 PROCEDURE — 1090F PRES/ABSN URINE INCON ASSESS: CPT | Performed by: INTERNAL MEDICINE

## 2021-07-21 PROCEDURE — G9717 DOC PT DX DEP/BP F/U NT REQ: HCPCS | Performed by: INTERNAL MEDICINE

## 2021-07-21 PROCEDURE — 83036 HEMOGLOBIN GLYCOSYLATED A1C: CPT | Performed by: INTERNAL MEDICINE

## 2021-07-21 RX ORDER — BLOOD SUGAR DIAGNOSTIC
STRIP MISCELLANEOUS
Qty: 50 STRIP | Refills: 5 | Status: SHIPPED | OUTPATIENT
Start: 2021-07-21 | End: 2021-10-20 | Stop reason: SDUPTHER

## 2021-07-21 RX ORDER — LANCETS 33 GAUGE
EACH MISCELLANEOUS
Qty: 180 LANCET | Refills: 1 | Status: SHIPPED | OUTPATIENT
Start: 2021-07-21 | End: 2021-10-20 | Stop reason: SDUPTHER

## 2021-07-21 RX ORDER — GLIPIZIDE 5 MG/1
TABLET ORAL
COMMUNITY
Start: 2021-05-09 | End: 2021-07-21 | Stop reason: ALTCHOICE

## 2021-07-21 RX ORDER — METHIMAZOLE 5 MG/1
TABLET ORAL
Qty: 45 TABLET | Refills: 1 | Status: SHIPPED | OUTPATIENT
Start: 2021-07-21 | End: 2022-04-21 | Stop reason: SDUPTHER

## 2021-07-21 RX ORDER — LINAGLIPTIN 5 MG/1
TABLET, FILM COATED ORAL
Qty: 90 TABLET | Refills: 1 | Status: SHIPPED | OUTPATIENT
Start: 2021-07-21 | End: 2021-10-20 | Stop reason: SDUPTHER

## 2021-07-21 NOTE — PROGRESS NOTES
History and Physical    Patient: Contreras Sterling MRN: [id number]  SSN: [id number]    YOB: 1945  Age: 68 y.o. Sex: female      Subjective:      Contreras Sterling is a 68 y.o. female with past medical history of hypertension, hyperlipidemia, chronic kidney disease stage III, depression, breast cancer status post surgery, radiation is sent to me by primary care physician Dr. Javad Price for type 2 diabetes mellitus. patient is a poor historian. At the last visit glipizide was stopped for possible hypoglycemia and we continue Tradjenta 5 mg daily. Patient is reporting compliance with medications. She has not made any changes in her eating habits. She thinks headaches and shakes has decreased but she is not sure. She is checking blood glucose once a day. Glucometer reading: Checking blood glucose once a day. Readings are ranging from 84- 185 mg/dL    uupdated diabetes history:  · Diagnosis: 4 years    · Current treatment: Tradjenta 5 mg daily    · Past treatment: glimepiride (hypoglycemia,, chronic kidney disease stage III), glipizide (?hypoglycemia)     · Glucose checks: once a day    · Hyperglycemia: no    · Hypoglycemia: no    · Meals per day: 2, brunch: cheese, cereal etc, dinner: cabbage, or fast food, snacks: crackers     · Exercise: no    · DM related hospitalizations: no        Complications of DM:    · CAD: no    · CVA: no    · PVD: no    · Amputations: no     · Retinopathy: no; last exam was     · Gastropathy: no    · Nephropathy: yes    · Neuropathy: no        Medications:    · Statin: atorvastatin    · ACE-I: losartan    · ASA: yes        · Diabetes education: no    Subclinical hyperthyroidism:  Initially noted in labs in 2016. Started on methimazole 2.5 mg daily in 2017. Patient was previously seeing endocrinologist Dr. Brandon White for this. Patient tells me that recently methimazole dose was increased from 2.5 mg to 5 mg.   Patient does not know why this was done but she tells she has been taking this for less than 1 month. Energy level is poor, which is not new, sleep is poor, again not new. Bowel movements regular, no heat or cold intolerance, no palpitations. Labs done after the last visit came back showing low free T4 and normal TSH. At that time methimazole was decreased back to 2.5 mg daily. However, patient has not noticed any change in her energy level or sleep. Continues to have poor sleep. Labs done after the last visit showed normal TSH. So we continued methimazole 2.5 mg daily. She has lost 4 pounds since last visit, however we have also stopped glipizide. Multinodular goiter:  Thyroid ultrasound August 2019:  Right lobe has 4.5 x 2.4 x 4 cm complex cyst    Left lobe has 1.2 x 0.9 x 1 cm solid nodule in the upper part,  1.6 x 1.7 x 1.1 cm cystic and solid nodule in the mid region  1.8 x 1.3 x 2.1 cm solid nodule in the inferior region    Right lobe complex nodule and left lobe 2.1 cm nodules were biopsied in October 2019. Right lobe nodule resulted as benign, left lobe nodule insufficient cells. Patient denies any difficulty in swallowing but she feels like the nodules in her neck are increasing in size. No family history of thyroid cancer. Patient had breast cancer. Thyroid ultrasound done in December 2020 came back showing right lobe 3.5 cm nodule and left lobe 2.5 cm nodule which met criteria for biopsy. This nodule was biopsied and it was resulted as benign.     Past Medical History:   Diagnosis Date    Adjustment disorder with depressed mood 4/10/2017    Adrenal adenoma 4/10/2017    Anxiety disorder 4/10/2017    Arthritis     Atherosclerosis     Backache 7/31/2020    Benign essential hypertension 7/31/2020    Breast cancer ([facility] [address]     Carcinoma in situ of breast 7/31/2020    Cataract 4/10/2017    Chronic kidney disease, stage 3 (moderate) 9/5/2019    Coronary arteriosclerosis 4/10/2017    Dehydration 7/31/2020  Depressive disorder 4/10/2017    Dyslipidemia 4/10/2017    Dyspnea 7/31/2020    Gastroesophageal reflux disease 4/10/2017    Gout     Heart disease     Heartburn     Hirsutism 4/10/2017    History of malignant neoplasm of breast 7/31/2020    HTN (hypertension)     Hyperkalemia 4/10/2017    Hyperlipidemia     Hypertensive disorder 7/31/2020    Hypertensive renal disease 7/31/2020    Hyperthyroidism     Insomnia 4/10/2017    Kidney disease     Lesion of skin of face 4/10/2017    Mixed hyperlipidemia 9/5/2019    Mixed hyperlipidemia due to type 2 diabetes mellitus ([address] 7/31/2020    Subcutaneous mass of neck 3/25/2019    Thyroid disease     Thyroid nodule 9/5/2019    Tobacco user 4/10/2017    Type 2 diabetes mellitus with hyperglycemia ([address] 9/5/2019    Varicose vein of leg     Vitamin D deficiency 4/10/2017     Past Surgical History:   Procedure Laterality Date    HX BREAST BIOPSY  08/18/2011    BIOPSY/REMOVAL LYMPH NODES    HX CATARACT REMOVAL  11/22/2016    HX OTHER SURGICAL  01/01/2004    Neck surgery    HX PARTIAL HYSTERECTOMY  01/01/1980    HX TONSILLECTOMY  01/01/1950    IR FINE NEEDLE ASPIRATION THYROID  12/21/2020    IR FINE NEEDLE ASPIRATION THYROID  12/21/2020    NM BREAST SURGERY PROCEDURE UNLISTED Right 01/20/2014    Drainage of fluid    NM BREAST SURGERY PROCEDURE UNLISTED  04/03/2013    BIOPSY BREAST PERCUT W/IMAGE    NM BREAST SURGERY PROCEDURE UNLISTED  07/26/2012    PLACE BREAST CLIP PERCUT    NM BREAST SURGERY PROCEDURE UNLISTED  07/26/2012    BIOPSY BREAST PERCUT W/DEVICE    NM BREAST SURGERY PROCEDURE UNLISTED  08/18/2011    BIOPSY BREAST PERCUT W/IMAGE    NM BREAST SURGERY PROCEDURE UNLISTED  08/11/2011    PLACE BREAST CLIP PERCUT    NM BREAST SURGERY PROCEDURE UNLISTED  08/11/2011    BIOPSY BREAST PERCUT W/DEVICE      Family History   Problem Relation Age of Onset    Gall Bladder Disease Mother     Diabetes Sister     Headache Sister    [facility] Hypertension Sister     Lung Disease Sister    Loraine Brunner Migraines Sister     Alcohol abuse Brother     Cancer Brother     Hypertension Brother     Alcohol abuse Maternal Aunt     Cancer Maternal Aunt     Diabetes Maternal Aunt     Alcohol abuse Maternal Uncle     Alcohol abuse Paternal Uncle     Diabetes Maternal Grandmother     Heart Disease Maternal Grandmother     Hypertension Maternal Grandmother     Asthma Child     Prostate Cancer Father      Social History     Tobacco Use    Smoking status: Current Every Day Smoker     Packs/day: 0.50     Years: 60.00     Pack years: 30.00    Smokeless tobacco: Never Used    Tobacco comment: 1/2 pack per day. Substance Use Topics    Alcohol use: No      Prior to Admission medications    Medication Sig Start Date End Date Taking? Authorizing Provider   linaGLIPtin (Tradjenta) 5 mg tablet Take 1 tablet by mouth once daily for 30 days 7/21/21  Yes Silvia Brittle, MD   methIMAzole (TAPAZOLE) 5 mg tablet Take half tablet every day, 90 days 7/21/21  Yes Silvia Brittle, MD   glucose blood VI test strips (OneTouch Verio test strips) strip Use to check glucose once a day 7/21/21  Yes Silvia Brittle, MD   lancets (One Touch Delica) 33 gauge misc USE 1  TO CHECK GLUCOSE TWICE DAILY  DX: E11.9 NPI: [id number] 7/21/21  Yes Silvia Brittle, MD   allopurinoL (ZYLOPRIM) 100 mg tablet Take 2 tablets by mouth once daily 6/6/21  Yes Derek Conti MD   FLUoxetine (PROzac) 20 mg capsule  1/20/21  Yes Provider, Historical   cyanocobalamin (Vitamin B-12) 1,000 mcg tablet Take 1,000 mcg by mouth daily.    Yes Provider, Historical   omeprazole (PRILOSEC) 40 mg capsule Take 1 capsule by mouth once daily in the morning 1/18/21  Yes Derek Conti MD   Colcrys 0.6 mg tablet Take 1 tablet by mouth once daily 1/10/21  Yes Derek Conti MD   sodium bicarbonate 650 mg tablet TAKE 2 TABLETS BY MOUTH TWICE DAILY 11/13/20  Yes Provider, Historical   albuterol (Ventolin HFA) 90 mcg/actuation inhaler Take 2 Puffs by inhalation every four (4) hours as needed for Wheezing. 10/22/20  Yes Derek Conti MD   amLODIPine (NORVASC) 5 mg tablet Take 1 Tab by mouth daily. 10/22/20  Yes Derek Conti MD   cholecalciferol, vitamin D3, (Vitamin D3) 50 mcg (2,000 unit) tab Take  by mouth. Yes Provider, Historical   diclofenac (VOLTAREN) 1 % gel Apply 2 g to affected area four (4) times daily. Yes Provider, Historical   OneTouch Verio Flex meter misc USE TO CHECK BLOOD SUGAR TWICE A DAY   Yes Provider, Historical   metoprolol succinate (TOPROL-XL) 25 mg XL tablet TAKE 1 2 (ONE HALF) TABLET BY MOUTH ONCE DAILY 9/26/19  Yes Provider, Historical   turmeric root extract 500 mg cap Take 500 mg by mouth daily. Yes Provider, Historical   vitamin E (AQUA GEMS) 400 unit capsule Take 400 Units by mouth daily. Yes Provider, Historical   multivit-minerals/folic acid (ADULT MULTIVITAMIN GUMMIES PO) Take 2 Tabs by mouth daily. Yes Provider, Historical   losartan (COZAAR) 100 mg tablet Take 100 mg by mouth daily. Yes Provider, Historical   aspirin delayed-release 81 mg tablet Take 81 mg by mouth daily. Yes Provider, Historical   atorvastatin (LIPITOR) 40 mg tablet Take 40 mg by mouth daily. Yes Provider, Historical        Allergies   Allergen Reactions    Iodinated Contrast Media Anaphylaxis    Codeine Itching       Review of Systems:  ROS    A comprehensive review of systems was preformed and it is negative except mentioned in HPI    Objective:     Vitals:    07/21/21 0946   BP: 122/72   Pulse: 71   Temp: 98 °F (36.7 °C)   TempSrc: Temporal   SpO2: 96%   Weight: 183 lb 3.2 oz (83.1 kg)   Height: 5' 4.5\" (1.638 m)        Physical Exam:    Physical Exam  Vitals and nursing note reviewed. Constitutional:       Appearance: Normal appearance. HENT:      Head: Normocephalic and atraumatic. Cardiovascular:      Rate and Rhythm: Normal rate and regular rhythm.    Pulmonary:      Effort: Pulmonary effort is normal.      Breath sounds: Normal breath sounds. Neurological:      Mental Status: She is alert. Labs and Imaging:  Results for Liyah Berry (MRN [id number]) as of 2/17/2021 14:51   Ref. Range 9/18/2017 00:00 8/11/2020 13:43 10/21/2020 12:29 1/6/2021 14:18   T4, Free Latest Ref Range: 0.82 - 1.77 ng/dL 0.51 (L) 0.81 (L) 0.86 0.69 (L)   TSH Latest Ref Range: 0.450 - 4.500 uIU/mL 1.800  0.788 4.350       Thyroid US 12-:  FINDINGS: Thyroid ultrasound. Right lobe 3.4 x 2.5 x 4.5 cm. Left lobe enlarged  5.3 x 2.7 x 3.0 cm. Isthmus thickened 0.6 cm.     Right lobe complex cyst versus solid nodule measures approximately 3.5 x 1.8 x  2.8 cm. TI-RADS 3. Recommend FNA.     Left lobe contains multiple nodules:  Left upper lobe anterior isoechoic well-defined 1.7 x 1.2 x by 1.4 cm. It is  difficult to tell if this nodule has microcalcifications. TI-RADS 3-4. Consider  FNA. Left lobe posterior complex cystic and solid 2.5 x 1.5 x 1.6 cm equivocally  taller rather than wide. TI-RADS 3-4. Recommend FNA. Anterior complex cyst 1.5 x 1.2 x 2.3 cm. TI-RADS 3. Recommend annual  sonographic follow-up.   Last 3 Recorded Weights in this Encounter    07/21/21 0946   Weight: 183 lb 3.2 oz (83.1 kg)        Lab Results   Component Value Date/Time    Hemoglobin A1c, External 6.7 03/04/2020 12:00 AM    Hemoglobin A1c, External 6.8 01/26/2018 12:00 AM        Assessment:     Patient Active Problem List   Diagnosis Code    Chronic tension headache G44.229    Medication overuse headache G44.40    Chronic non-specific white matter lesions on MRI G93.9    Mild cerebral atrophy (HCC) G31.9    Subclinical hyperthyroidism E05.90    Coarse tremors G25.2    Severe obesity (HCC) E66.01    Adjustment disorder with depressed mood F43.21    Adrenal adenoma D35.00    Anxiety disorder F41.9    Cataract H26.9    Stage 3 chronic kidney disease N18.30    Coronary arteriosclerosis I25.10    Depressive disorder F32.9    Dyslipidemia E78.5    Gastroesophageal reflux disease K21.9    Hirsutism L68.0    Hyperkalemia E87.5    Insomnia G47.00    Mixed hyperlipidemia E78.2    Thyroid nodule E04.1    Type 2 diabetes mellitus with hyperglycemia ([facility]) E11.65    Vitamin D deficiency E55.9    Tobacco user Z72.0    Lesion of skin of face L98.9    Subcutaneous mass of neck R22.1    Backache M54.9    Benign essential hypertension I10    Carcinoma in situ of breast D05.90    Dehydration E86.0    Dyspnea R06.00    History of malignant neoplasm of breast Z85.3    Hypertensive disorder I10    Hypertensive renal disease I12.9    Mixed hyperlipidemia due to type 2 diabetes mellitus (HCC) E11.69, E78.2    Heartburn R12    Kidney disease N28.9    Headache disorder R51.9    Gout M10.9    Tremor R25.1    Sialoadenitis of submandibular gland K11.20    Polyp of colon K63.5    Type 2 diabetes mellitus with hyperglycemia, without long-term current use of insulin ([facility]) E11.65    Multinodular goiter E04.2           Plan:     type II diabetes mellitus well controlled  Hemoglobin A1c was 7% in April 2019, 6.5% on 9-5-2019, 6.3% on 12-5-2019, 6.7% in 3-2020, 6.7% on 12-2-2020, 6.4% on 4-, 6.4% today. Fingerstick blood glucose is 145 mg/dL in my office today. Up to date with diabetes related annual labs: yes 1-2021    Up to date with diabetic eye exam: 6-2021    plan:  Glucose control looks acceptable. Continue Tradjenta 5 mg daily. Check blood glucose once a day and I will see her back in 3 months. hypertensive renal disease  blood pressure well controlled on current medications. chronic kidney disease stage 3  GFR 38 in August 2019. Closely following with nephrology. hyperthyroidism  on methimazole 2.5 mg daily. TSH was normal in August 2019 and October 2020, free T4 was low.   However, methimazole was increased to 5 mg daily in November 2020.  16-2021:  TSH normal at 4.35  Free T4 low at 0.69  Methimazole was decreased to 2.5 mg daily    4-:  TSH normal at 0.532  Free T4 normal at 0.98  Methimazole 2.5 mg daily was continued  Plan:  Check TSH in 6 months. mixed hyperlipidemia  LDL was 66 in April 2019, 53 in January 2021. Continue atorvastatin 40 mg daily. thyroid nodule  Thyroid ultrasound 12-: Right lobe 3.5 and left lobe 2.5 cm TR 4 nodules meet criteria for biopsy. Thyroid biopsy of these nodules on 12- were resulted as benign. Plan:  Repeat ultrasound in 1 year (December 2021)    history of malignant neoplasm of breast  status post surgery, radiation. Now on letrozole.     Orders Placed This Encounter    AMB POC GLUCOSE BLOOD, BY GLUCOSE MONITORING DEVICE    AMB POC HEMOGLOBIN A1C    linaGLIPtin (Tradjenta) 5 mg tablet     Sig: Take 1 tablet by mouth once daily for 30 days     Dispense:  90 Tablet     Refill:  1     DC glipizide    methIMAzole (TAPAZOLE) 5 mg tablet     Sig: Take half tablet every day, 90 days     Dispense:  45 Tablet     Refill:  1    glucose blood VI test strips (OneTouch Verio test strips) strip     Sig: Use to check glucose once a day     Dispense:  50 Strip     Refill:  5    lancets (One Touch Delica) 33 gauge misc     Sig: USE 1  TO CHECK GLUCOSE TWICE DAILY  DX: E11.9 NPI: [id number]     Dispense:  180 Lancet     Refill:  1        Signed By: William Capps MD     July 21, 2021      Return to clinic 3 months

## 2021-07-21 NOTE — LETTER
7/21/2021    Patient: Davi Mcdaniels   YOB: 1945   Date of Visit: 7/21/2021     Tim Short MD  [address] [place] [id number]  Via Fax: [phone]    Dear Tim Short MD,      Thank you for referring Ms. Davi Mcdaniels to [place] [id number] for evaluation. My notes for this consultation are attached. If you have questions, please do not hesitate to call me. I look forward to following your patient along with you.       Sincerely,    Veronica Davis MD

## 2021-08-03 PROBLEM — I10 BENIGN ESSENTIAL HYPERTENSION: Status: RESOLVED | Noted: 2020-07-31 | Resolved: 2021-08-03

## 2021-09-07 RX ORDER — DICLOFENAC SODIUM 10 MG/G
GEL TOPICAL
Qty: 500 G | Refills: 0 | Status: SHIPPED | OUTPATIENT
Start: 2021-09-07 | End: 2022-07-08

## 2021-10-06 ENCOUNTER — TRANSCRIBE ORDER (OUTPATIENT)
Dept: SCHEDULING | Age: 76
End: 2021-10-06

## 2021-10-06 DIAGNOSIS — Z12.31 VISIT FOR SCREENING MAMMOGRAM: Primary | ICD-10-CM

## 2021-10-13 ENCOUNTER — HOSPITAL ENCOUNTER (OUTPATIENT)
Dept: LAB | Age: 76
Discharge: HOME | End: 2021-10-13
Payer: MEDICARE

## 2021-10-13 ENCOUNTER — TRANSCRIBE ORDER (OUTPATIENT)
Dept: REGISTRATION | Age: 76
End: 2021-10-13

## 2021-10-13 DIAGNOSIS — E11.9 DIABETES MELLITUS (HCC): ICD-10-CM

## 2021-10-13 DIAGNOSIS — E55.9 AVITAMINOSIS D: ICD-10-CM

## 2021-10-13 DIAGNOSIS — N18.30 CHRONIC KIDNEY DISEASE, STAGE III (MODERATE) (HCC): ICD-10-CM

## 2021-10-13 DIAGNOSIS — N18.30 CHRONIC KIDNEY DISEASE, STAGE III (MODERATE) (HCC): Primary | ICD-10-CM

## 2021-10-13 LAB
BASOPHILS # BLD: 0.2 K/UL (ref 0–0.2)
BASOPHILS NFR BLD: 2 % (ref 0–2.5)
CREAT UR-MCNC: 139.44 MG/DL
EOSINOPHIL # BLD: 0.3 K/UL (ref 0–0.7)
EOSINOPHIL NFR BLD: 3 % (ref 0.9–2.9)
ERYTHROCYTE [DISTWIDTH] IN BLOOD BY AUTOMATED COUNT: 15.3 % (ref 11.5–14.5)
EST. AVERAGE GLUCOSE BLD GHB EST-MCNC: 128 MG/DL
HBA1C MFR BLD: 6.1 % (ref 4–5.6)
HCT VFR BLD AUTO: 40 % (ref 36–46)
HGB BLD-MCNC: 13.3 G/DL (ref 13.5–17.5)
LYMPHOCYTES # BLD: 2.5 K/UL (ref 1–4.8)
LYMPHOCYTES NFR BLD: 20 % (ref 20.5–51.1)
MCH RBC QN AUTO: 30.3 PG (ref 31–34)
MCHC RBC AUTO-ENTMCNC: 33.4 G/DL (ref 31–36)
MCV RBC AUTO: 90.9 FL (ref 80–100)
MONOCYTES # BLD: 0.9 K/UL (ref 0.2–2.4)
MONOCYTES NFR BLD: 7 % (ref 1.7–9.3)
NEUTS SEG # BLD: 8.8 K/UL (ref 1.8–7.7)
NEUTS SEG NFR BLD: 68 % (ref 42–75)
NRBC # BLD: 0.01 K/UL
NRBC BLD-RTO: 0 PER 100 WBC
PLATELET # BLD AUTO: 281 K/UL (ref 150–400)
PMV BLD AUTO: 7.9 FL (ref 6.5–11.5)
PROT UR-MCNC: 182 MG/DL (ref 0–11.9)
RBC # BLD AUTO: 4.4 M/UL (ref 4.5–5.9)
WBC # BLD AUTO: 12.7 K/UL (ref 4.4–11.3)

## 2021-10-13 PROCEDURE — 84156 ASSAY OF PROTEIN URINE: CPT

## 2021-10-13 PROCEDURE — 82306 VITAMIN D 25 HYDROXY: CPT

## 2021-10-13 PROCEDURE — 83970 ASSAY OF PARATHORMONE: CPT

## 2021-10-13 PROCEDURE — 83036 HEMOGLOBIN GLYCOSYLATED A1C: CPT

## 2021-10-13 PROCEDURE — 85025 COMPLETE CBC W/AUTO DIFF WBC: CPT

## 2021-10-13 PROCEDURE — 82570 ASSAY OF URINE CREATININE: CPT

## 2021-10-13 PROCEDURE — 36415 COLL VENOUS BLD VENIPUNCTURE: CPT

## 2021-10-14 LAB
25(OH)D3 SERPL-MCNC: 42.7 NG/ML (ref 30–100)
CA-I BLD-MCNC: 8.7 MG/DL (ref 8.5–10.1)
PTH-INTACT SERPL-MCNC: 127.4 PG/ML (ref 18.4–88)

## 2021-10-20 ENCOUNTER — OFFICE VISIT (OUTPATIENT)
Dept: ENDOCRINOLOGY | Age: 76
End: 2021-10-20
Payer: MEDICARE

## 2021-10-20 VITALS
HEIGHT: 65 IN | TEMPERATURE: 98 F | WEIGHT: 180 LBS | DIASTOLIC BLOOD PRESSURE: 71 MMHG | BODY MASS INDEX: 29.99 KG/M2 | SYSTOLIC BLOOD PRESSURE: 114 MMHG | HEART RATE: 102 BPM | OXYGEN SATURATION: 92 %

## 2021-10-20 DIAGNOSIS — E04.2 MULTIPLE THYROID NODULES: ICD-10-CM

## 2021-10-20 DIAGNOSIS — E11.65 TYPE 2 DIABETES MELLITUS WITH HYPERGLYCEMIA, WITHOUT LONG-TERM CURRENT USE OF INSULIN (HCC): Primary | ICD-10-CM

## 2021-10-20 DIAGNOSIS — E11.65 TYPE 2 DIABETES MELLITUS WITH HYPERGLYCEMIA, UNSPECIFIED WHETHER LONG TERM INSULIN USE (HCC): ICD-10-CM

## 2021-10-20 LAB — GLUCOSE POC: 166 MG/DL

## 2021-10-20 PROCEDURE — G8427 DOCREV CUR MEDS BY ELIG CLIN: HCPCS | Performed by: INTERNAL MEDICINE

## 2021-10-20 PROCEDURE — G8417 CALC BMI ABV UP PARAM F/U: HCPCS | Performed by: INTERNAL MEDICINE

## 2021-10-20 PROCEDURE — 1100F PTFALLS ASSESS-DOCD GE2>/YR: CPT | Performed by: INTERNAL MEDICINE

## 2021-10-20 PROCEDURE — G8536 NO DOC ELDER MAL SCRN: HCPCS | Performed by: INTERNAL MEDICINE

## 2021-10-20 PROCEDURE — G8754 DIAS BP LESS 90: HCPCS | Performed by: INTERNAL MEDICINE

## 2021-10-20 PROCEDURE — G8752 SYS BP LESS 140: HCPCS | Performed by: INTERNAL MEDICINE

## 2021-10-20 PROCEDURE — G8400 PT W/DXA NO RESULTS DOC: HCPCS | Performed by: INTERNAL MEDICINE

## 2021-10-20 PROCEDURE — 3288F FALL RISK ASSESSMENT DOCD: CPT | Performed by: INTERNAL MEDICINE

## 2021-10-20 PROCEDURE — 99214 OFFICE O/P EST MOD 30 MIN: CPT | Performed by: INTERNAL MEDICINE

## 2021-10-20 PROCEDURE — G9717 DOC PT DX DEP/BP F/U NT REQ: HCPCS | Performed by: INTERNAL MEDICINE

## 2021-10-20 PROCEDURE — 1090F PRES/ABSN URINE INCON ASSESS: CPT | Performed by: INTERNAL MEDICINE

## 2021-10-20 PROCEDURE — 82962 GLUCOSE BLOOD TEST: CPT | Performed by: INTERNAL MEDICINE

## 2021-10-20 RX ORDER — LANCETS 33 GAUGE
EACH MISCELLANEOUS
Qty: 180 LANCET | Refills: 1 | Status: SHIPPED | OUTPATIENT
Start: 2021-10-20 | End: 2022-03-23 | Stop reason: SDUPTHER

## 2021-10-20 RX ORDER — GLIPIZIDE 5 MG/1
TABLET ORAL
COMMUNITY
Start: 2021-09-02 | End: 2021-10-21

## 2021-10-20 RX ORDER — BLOOD SUGAR DIAGNOSTIC
STRIP MISCELLANEOUS
Qty: 50 STRIP | Refills: 5 | Status: SHIPPED | OUTPATIENT
Start: 2021-10-20 | End: 2022-07-20 | Stop reason: SDUPTHER

## 2021-10-20 RX ORDER — LINAGLIPTIN 5 MG/1
TABLET, FILM COATED ORAL
Qty: 90 TABLET | Refills: 1 | Status: SHIPPED | OUTPATIENT
Start: 2021-10-20 | End: 2022-01-19 | Stop reason: SDUPTHER

## 2021-10-20 RX ORDER — TRAZODONE HYDROCHLORIDE 50 MG/1
TABLET ORAL
COMMUNITY
Start: 2021-09-07 | End: 2022-07-20

## 2021-10-20 NOTE — PROGRESS NOTES
History and Physical    Patient: Kerrie Shrestha MRN: [id number]  SSN: [id number]    YOB: 1945  Age: 68 y.o. Sex: female      Subjective:      Kerrie Shrestha is a 68 y.o. female with past medical history of hypertension, hyperlipidemia, chronic kidney disease stage III, depression, breast cancer status post surgery, radiation is sent to me by primary care physician Dr. Coral Tripathi for type 2 diabetes mellitus. patient is a poor historian. At the last visit we continue Tradjenta 5 mg daily. Glipizide was stopped a few months back because of possible hypoglycemia. Patient is reporting compliance with medications. She has been noncompliant with diabetic diet, eating more sweets than usual.  She checks blood glucose once a day. Denies any hypoglycemia. Glucometer reading: Checking blood glucose once a day. Readings are ranging from 124 to 187 mg/dL    uupdated diabetes history:  · Diagnosis: 4 years    · Current treatment: Tradjenta 5 mg daily    · Past treatment: glimepiride (hypoglycemia,, chronic kidney disease stage III), glipizide (?hypoglycemia)     · Glucose checks: once a day    · Hyperglycemia: no    · Hypoglycemia: no    · Meals per day: 2, brunch: cheese, cereal etc, dinner: cabbage, or fast food, snacks: crackers     · Exercise: no    · DM related hospitalizations: no        Complications of DM:    · CAD: no    · CVA: no    · PVD: no    · Amputations: no     · Retinopathy: no; last exam was 6-2021    · Gastropathy: no    · Nephropathy: yes    · Neuropathy: no        Medications:    · Statin: atorvastatin    · ACE-I: losartan    · ASA: yes        · Diabetes education: no    Subclinical hyperthyroidism:  Initially noted in labs in September 2016. Started on methimazole 2.5 mg daily in June 2017. Patient was previously seeing endocrinologist Dr. Erendira Reyes for this. Patient tells me that recently methimazole dose was increased from 2.5 mg to 5 mg.   Patient does not know why this was done but she tells she has been taking this for less than 1 month. Energy level is poor, which is not new, sleep is poor, again not new. Bowel movements regular, no heat or cold intolerance, no palpitations. Labs done after the last visit came back showing low free T4 and normal TSH. At that time methimazole was decreased back to 2.5 mg daily. However, patient has not noticed any change in her energy level or sleep. Continues to have poor sleep. Labs done after the last visit showed normal TSH. So we continued methimazole 2.5 mg daily. She has lost 3 pounds since last visit 3 months back. She is having trouble sleeping at night. Denies any palpitations or tremors. Multinodular goiter:  Thyroid ultrasound August 2019:  Right lobe has 4.5 x 2.4 x 4 cm complex cyst    Left lobe has 1.2 x 0.9 x 1 cm solid nodule in the upper part,  1.6 x 1.7 x 1.1 cm cystic and solid nodule in the mid region  1.8 x 1.3 x 2.1 cm solid nodule in the inferior region    Right lobe complex nodule and left lobe 2.1 cm nodules were biopsied in October 2019. Right lobe nodule resulted as benign, left lobe nodule insufficient cells. Patient denies any difficulty in swallowing but she feels like the nodules in her neck are increasing in size. No family history of thyroid cancer. Patient had breast cancer. Thyroid ultrasound done in December 2020 came back showing right lobe 3.5 cm nodule and left lobe 2.5 cm nodule which met criteria for biopsy. This nodule was biopsied and it was resulted as benign. Patient is reporting some difficulty in swallowing when she lays down. However, this is not new. She is noticing enlarged lymph node in the submandibular area which is tender to palpation, which appears on and off. Denies any dental infections.     Past Medical History:   Diagnosis Date    Adjustment disorder with depressed mood 4/10/2017    Adrenal adenoma 4/10/2017    Anxiety disorder 4/10/2017    Arthritis     Atherosclerosis     Backache 7/31/2020    Benign essential hypertension 7/31/2020    Breast cancer (HCC)     Carcinoma in situ of breast 7/31/2020    Cataract 4/10/2017    Chronic kidney disease, stage 3 (moderate) 9/5/2019    Coronary arteriosclerosis 4/10/2017    Dehydration 7/31/2020    Depressive disorder 4/10/2017    Dyslipidemia 4/10/2017    Dyspnea 7/31/2020    Gastroesophageal reflux disease 4/10/2017    Gout     Heart disease     Heartburn     Hirsutism 4/10/2017    History of malignant neoplasm of breast 7/31/2020    HTN (hypertension)     Hyperkalemia 4/10/2017    Hyperlipidemia     Hypertensive disorder 7/31/2020    Hypertensive renal disease 7/31/2020    Hyperthyroidism     Insomnia 4/10/2017    Kidney disease     Lesion of skin of face 4/10/2017    Mixed hyperlipidemia 9/5/2019    Mixed hyperlipidemia due to type 2 diabetes mellitus ([address] 7/31/2020    Subcutaneous mass of neck 3/25/2019    Thyroid disease     Thyroid nodule 9/5/2019    Tobacco user 4/10/2017    Type 2 diabetes mellitus with hyperglycemia ([address] 9/5/2019    Varicose vein of leg     Vitamin D deficiency 4/10/2017     Past Surgical History:   Procedure Laterality Date    HX BREAST BIOPSY  08/18/2011    BIOPSY/REMOVAL LYMPH NODES    HX CATARACT REMOVAL  11/22/2016    HX OTHER SURGICAL  01/01/2004    Neck surgery    HX PARTIAL HYSTERECTOMY  01/01/1980    HX TONSILLECTOMY  01/01/1950    IR FINE NEEDLE ASPIRATION THYROID  12/21/2020    IR FINE NEEDLE ASPIRATION THYROID  12/21/2020    CA BREAST SURGERY PROCEDURE UNLISTED Right 01/20/2014    Drainage of fluid    CA BREAST SURGERY PROCEDURE UNLISTED  04/03/2013    BIOPSY BREAST PERCUT W/IMAGE    CA BREAST SURGERY PROCEDURE UNLISTED  07/26/2012    PLACE BREAST CLIP PERCUT    CA BREAST SURGERY PROCEDURE UNLISTED  07/26/2012    BIOPSY BREAST PERCUT W/DEVICE    CA BREAST SURGERY PROCEDURE UNLISTED  08/18/2011    BIOPSY BREAST PERCUT W/IMAGE    NC BREAST SURGERY PROCEDURE UNLISTED  08/11/2011    PLACE BREAST CLIP PERCUT    NC BREAST SURGERY PROCEDURE UNLISTED  08/11/2011    BIOPSY BREAST PERCUT W/DEVICE      Family History   Problem Relation Age of Onset    Gall Bladder Disease Mother     Diabetes Sister     Headache Sister     Hypertension Sister     Lung Disease Sister    [facility] Migraines Sister     Alcohol abuse Brother     Cancer Brother     Hypertension Brother     Alcohol abuse Maternal Aunt     Cancer Maternal Aunt     Diabetes Maternal Aunt     Alcohol abuse Maternal Uncle     Alcohol abuse Paternal Uncle     Diabetes Maternal Grandmother     Heart Disease Maternal Grandmother     Hypertension Maternal Grandmother     Asthma Child     Prostate Cancer Father      Social History     Tobacco Use    Smoking status: Current Every Day Smoker     Packs/day: 0.50     Years: 60.00     Pack years: 30.00    Smokeless tobacco: Never Used    Tobacco comment: 1/2 pack per day. Substance Use Topics    Alcohol use: No      Prior to Admission medications    Medication Sig Start Date End Date Taking? Authorizing Provider   traZODone (DESYREL) 50 mg tablet TAKE 1 TABLET BY MOUTH EVERY DAY AT BEDTIME AS NEEDED FOR SLEEP 9/7/21  Yes Provider, Historical   glipiZIDE (GLUCOTROL) 5 mg tablet TAKE 1 TABLET BY MOUTH TWICE DAILY 20 MINUTES BEFORE MEALS.  DO NOT TAKE IF NOT EATING 9/2/21  Yes Provider, Historical   linaGLIPtin (Tradjenta) 5 mg tablet Take 1 tablet by mouth once daily for 30 days 10/20/21  Yes Andre Martinez MD   glucose blood VI test strips (OneTouch Verio test strips) strip Use to check glucose once a day 10/20/21  Yes Andre Martinez MD   lancets (One Touch Delica) 33 gauge misc USE 1  TO CHECK GLUCOSE TWICE DAILY  DX: E11.9 NPI: [id number] 10/20/21  Yes Andre Martinez MD   diclofenac (VOLTAREN) 1 % gel APPLY 2 GRAMS TOPICALLY TO AFFECTED AREA 4 TIMES DAILY 9/7/21  Yes MADIHA Vallejo   methIMAzole (TAPAZOLE) 5 mg tablet Take half tablet every day, 90 days 7/21/21  Yes Elisabeth Monique MD   allopurinoL (ZYLOPRIM) 100 mg tablet Take 2 tablets by mouth once daily 6/6/21  Yes Dwayne Gleason MD   FLUoxetine (PROzac) 20 mg capsule  1/20/21  Yes Provider, Historical   cyanocobalamin (Vitamin B-12) 1,000 mcg tablet Take 1,000 mcg by mouth daily. Yes Provider, Historical   omeprazole (PRILOSEC) 40 mg capsule Take 1 capsule by mouth once daily in the morning 1/18/21  Yes Dwayne Gleason MD   Colcrys 0.6 mg tablet Take 1 tablet by mouth once daily 1/10/21  Yes Dwayne Gleason MD   sodium bicarbonate 650 mg tablet TAKE 2 TABLETS BY MOUTH TWICE DAILY 11/13/20  Yes Provider, Historical   albuterol (Ventolin HFA) 90 mcg/actuation inhaler Take 2 Puffs by inhalation every four (4) hours as needed for Wheezing. 10/22/20  Yes Dwayne Gleason MD   amLODIPine (NORVASC) 5 mg tablet Take 1 Tab by mouth daily. 10/22/20  Yes Dwayne Gleason MD   cholecalciferol, vitamin D3, (Vitamin D3) 50 mcg (2,000 unit) tab Take  by mouth. Yes Provider, Historical   OneTouch Verio Flex meter misc USE TO CHECK BLOOD SUGAR TWICE A DAY   Yes Provider, Historical   metoprolol succinate (TOPROL-XL) 25 mg XL tablet TAKE 1 2 (ONE HALF) TABLET BY MOUTH ONCE DAILY 9/26/19  Yes Provider, Historical   turmeric root extract 500 mg cap Take 500 mg by mouth daily. Yes Provider, Historical   vitamin E (AQUA GEMS) 400 unit capsule Take 400 Units by mouth daily. Yes Provider, Historical   multivit-minerals/folic acid (ADULT MULTIVITAMIN GUMMIES PO) Take 2 Tabs by mouth daily. Yes Provider, Historical   losartan (COZAAR) 100 mg tablet Take 100 mg by mouth daily. Yes Provider, Historical   aspirin delayed-release 81 mg tablet Take 81 mg by mouth daily. Yes Provider, Historical   atorvastatin (LIPITOR) 40 mg tablet Take 40 mg by mouth daily.    Yes Provider, Historical        Allergies   Allergen Reactions    Iodinated Contrast Media Anaphylaxis    Codeine Itching Review of Systems:  ROS    A comprehensive review of systems was preformed and it is negative except mentioned in HPI    Objective:     Vitals:    10/20/21 0943   BP: 114/71   Pulse: (!) 102   Temp: 98 °F (36.7 °C)   TempSrc: Temporal   SpO2: 92%   Weight: 180 lb (81.6 kg)   Height: 5' 4.5\" (1.638 m)        Physical Exam:    Physical Exam  Vitals and nursing note reviewed. Constitutional:       Appearance: Normal appearance. HENT:      Head: Normocephalic and atraumatic. Neck:      Comments: No cervical lymphadenopathy  Cardiovascular:      Rate and Rhythm: Normal rate and regular rhythm. Pulmonary:      Effort: Pulmonary effort is normal.      Breath sounds: Normal breath sounds. Neurological:      Mental Status: She is alert. Labs and Imaging:  Results for [place] (MRN [id number]) as of 2/17/2021 14:51   Ref. Range 9/18/2017 00:00 8/11/2020 13:43 10/21/2020 12:29 1/6/2021 14:18   T4, Free Latest Ref Range: 0.82 - 1.77 ng/dL 0.51 (L) 0.81 (L) 0.86 0.69 (L)   TSH Latest Ref Range: 0.450 - 4.500 uIU/mL 1.800  0.788 4.350       Thyroid US 12-:  FINDINGS: Thyroid ultrasound. Right lobe 3.4 x 2.5 x 4.5 cm. Left lobe enlarged  5.3 x 2.7 x 3.0 cm. Isthmus thickened 0.6 cm.     Right lobe complex cyst versus solid nodule measures approximately 3.5 x 1.8 x  2.8 cm. TI-RADS 3. Recommend FNA.     Left lobe contains multiple nodules:  Left upper lobe anterior isoechoic well-defined 1.7 x 1.2 x by 1.4 cm. It is  difficult to tell if this nodule has microcalcifications. TI-RADS 3-4. Consider  FNA. Left lobe posterior complex cystic and solid 2.5 x 1.5 x 1.6 cm equivocally  taller rather than wide. TI-RADS 3-4. Recommend FNA. Anterior complex cyst 1.5 x 1.2 x 2.3 cm. TI-RADS 3. Recommend annual  sonographic follow-up.   Last 3 Recorded Weights in this Encounter    10/20/21 0943   Weight: 180 lb (81.6 kg)        Lab Results   Component Value Date/Time    Hemoglobin A1c 6.1 (H) 10/13/2021 10:56 AM    Hemoglobin A1c, External 6.7 03/04/2020 12:00 AM    Hemoglobin A1c, External 6.8 01/26/2018 12:00 AM        Assessment:     Patient Active Problem List   Diagnosis Code    Chronic tension headache G44.229    Medication overuse headache G44.40    Chronic non-specific white matter lesions on MRI G93.9    Mild cerebral atrophy (HCC) G31.9    Subclinical hyperthyroidism E05.90    Coarse tremors G25.2    Severe obesity (HCC) E66.01    Adjustment disorder with depressed mood F43.21    Adrenal adenoma D35.00    Anxiety disorder F41.9    Cataract H26.9    Stage 3 chronic kidney disease N18.30    Coronary arteriosclerosis I25.10    Depressive disorder F32.9    Dyslipidemia E78.5    Gastroesophageal reflux disease K21.9    Hirsutism L68.0    Hyperkalemia E87.5    Insomnia G47.00    Mixed hyperlipidemia E78.2    Thyroid nodule E04.1    Type 2 diabetes mellitus with hyperglycemia ([facility]) E11.65    Vitamin D deficiency E55.9    Tobacco user Z72.0    Lesion of skin of face L98.9    Subcutaneous mass of neck R22.1    Backache M54.9    Carcinoma in situ of breast D05.90    Dehydration E86.0    Dyspnea R06.00    History of malignant neoplasm of breast Z85.3    Hypertensive disorder I10    Hypertensive renal disease I12.9    Mixed hyperlipidemia due to type 2 diabetes mellitus (HCC) E11.69, E78.2    Heartburn R12    Kidney disease N28.9    Headache disorder R51.9    Gout M10.9    Tremor R25.1    Sialoadenitis of submandibular gland K11.20    Polyp of colon K63.5    Type 2 diabetes mellitus with hyperglycemia, without long-term current use of insulin ([facility]) E11.65    Multinodular goiter E04.2           Plan:     type II diabetes mellitus well controlled  Hemoglobin A1c was 7% in April 2019, 6.5% on 9-5-2019, 6.3% on 12-5-2019, 6.7% in 3-2020, 6.7% on 12-2-2020, 6.4% on 4-, 6.4% on 7-, 6.1 on 10-. Fingerstick blood glucose is 166 mg/dL in my office today.     Up to date with diabetes related annual labs: yes 1-2021    Up to date with diabetic eye exam: 6-2021    plan:  Glucose control looks acceptable. Continue Tradjenta 5 mg daily. Check blood glucose once a day and I will see her back in 3 months. Get diabetes related annual labs done today. hypertensive renal disease  blood pressure well controlled on current medications. chronic kidney disease stage 3  GFR 38 in August 2019, 37 in May 2021. Closely following with nephrology. hyperthyroidism  on methimazole 2.5 mg daily. TSH was normal in August 2019 and October 2020, free T4 was low. However, methimazole was increased to 5 mg daily in November 2020.  1-6-2021:  TSH normal at 4.35  Free T4 low at 0.69  Methimazole was decreased to 2.5 mg daily    4-:  TSH normal at 0.532  Free T4 normal at 0.98  Methimazole 2.5 mg daily was continued  Pulse rate is elevated today, patient is having trouble sleeping. Plan:  Check TSH as it has been 6 months. I will send refill on methimazole based on the result. mixed hyperlipidemia  LDL was 66 in April 2019, 53 in January 2021. Continue atorvastatin 40 mg daily. Check lipid profile today. thyroid nodule  Thyroid ultrasound 12-: Right lobe 3.5 and left lobe 2.5 cm TR 4 nodules meet criteria for biopsy. Thyroid biopsy of these nodules on 12- were resulted as benign. Plan:  Repeat ultrasound prior to next visit. history of malignant neoplasm of breast  status post surgery, radiation. Now on letrozole.     Orders Placed This Encounter    US THYROID/PARATHYROID/SOFT TISS     Standing Status:   Future     Standing Expiration Date:   11/20/2022    LIPID PANEL    METABOLIC PANEL, COMPREHENSIVE    TSH RFX ON ABNORMAL TO FREE T4    AMB POC GLUCOSE BLOOD, BY GLUCOSE MONITORING DEVICE    linaGLIPtin (Tradjenta) 5 mg tablet     Sig: Take 1 tablet by mouth once daily for 30 days     Dispense:  90 Tablet     Refill:  1     DC glipizide    glucose blood VI test strips (OneTouch Verio test strips) strip     Sig: Use to check glucose once a day     Dispense:  50 Strip     Refill:  5    lancets (One Touch Delica) 33 gauge misc     Sig: USE 1  TO CHECK GLUCOSE TWICE DAILY  DX: E11.9 NPI: [id number]     Dispense:  180 Lancet     Refill:  1        Signed By: Derek Johnson MD     October 20, 2021      Return to clinic 3 months

## 2021-10-20 NOTE — LETTER
10/20/2021    Patient: Melly Leach   YOB: 1945   Date of Visit: 10/20/2021     Kaleigh Cruz MD  [address] [place] [address]  Via Fax: [phone]    Dear Kaleigh Cruz MD,      Thank you for referring Ms. Melly Leach to [place] [id number] for evaluation. My notes for this consultation are attached. If you have questions, please do not hesitate to call me. I look forward to following your patient along with you.       Sincerely,    Uday Katz MD

## 2021-10-21 DIAGNOSIS — E05.90 SUBCLINICAL HYPERTHYROIDISM: ICD-10-CM

## 2021-10-21 DIAGNOSIS — E78.2 MIXED HYPERLIPIDEMIA: ICD-10-CM

## 2021-10-21 DIAGNOSIS — E11.65 TYPE 2 DIABETES MELLITUS WITH HYPERGLYCEMIA, WITHOUT LONG-TERM CURRENT USE OF INSULIN (HCC): Primary | ICD-10-CM

## 2021-10-21 LAB
ALBUMIN SERPL-MCNC: 3.6 G/DL (ref 3.7–4.7)
ALBUMIN/GLOB SERPL: 1.4 {RATIO} (ref 1.2–2.2)
ALP SERPL-CCNC: 91 IU/L (ref 44–121)
ALT SERPL-CCNC: 15 IU/L (ref 0–32)
AST SERPL-CCNC: 19 IU/L (ref 0–40)
BILIRUB SERPL-MCNC: 0.2 MG/DL (ref 0–1.2)
BUN SERPL-MCNC: 22 MG/DL (ref 8–27)
BUN/CREAT SERPL: 14 (ref 12–28)
CALCIUM SERPL-MCNC: 9 MG/DL (ref 8.7–10.3)
CHLORIDE SERPL-SCNC: 107 MMOL/L (ref 96–106)
CHOLEST SERPL-MCNC: 188 MG/DL (ref 100–199)
CO2 SERPL-SCNC: 23 MMOL/L (ref 20–29)
CREAT SERPL-MCNC: 1.59 MG/DL (ref 0.57–1)
GLOBULIN SER CALC-MCNC: 2.5 G/DL (ref 1.5–4.5)
GLUCOSE SERPL-MCNC: 161 MG/DL (ref 65–99)
HDLC SERPL-MCNC: 32 MG/DL
LDLC SERPL CALC-MCNC: 97 MG/DL (ref 0–99)
POTASSIUM SERPL-SCNC: 4.4 MMOL/L (ref 3.5–5.2)
PROT SERPL-MCNC: 6.1 G/DL (ref 6–8.5)
SODIUM SERPL-SCNC: 143 MMOL/L (ref 134–144)
TRIGL SERPL-MCNC: 349 MG/DL (ref 0–149)
TSH SERPL DL<=0.005 MIU/L-ACNC: 2.12 UIU/ML (ref 0.45–4.5)
VLDLC SERPL CALC-MCNC: 59 MG/DL (ref 5–40)

## 2021-10-21 RX ORDER — ATORVASTATIN CALCIUM 40 MG/1
40 TABLET, FILM COATED ORAL
Qty: 90 TABLET | Refills: 2 | Status: SHIPPED | OUTPATIENT
Start: 2021-10-21 | End: 2022-01-19 | Stop reason: SDUPTHER

## 2021-10-21 RX ORDER — METHIMAZOLE 5 MG/1
TABLET ORAL
Qty: 45 TABLET | Refills: 2 | Status: SHIPPED | OUTPATIENT
Start: 2021-10-21 | End: 2022-01-19 | Stop reason: SDUPTHER

## 2021-10-21 RX ORDER — FENOFIBRATE 145 MG/1
145 TABLET, COATED ORAL DAILY
Qty: 90 TABLET | Refills: 2 | Status: SHIPPED | OUTPATIENT
Start: 2021-10-21 | End: 2022-01-19 | Stop reason: SDUPTHER

## 2021-10-21 NOTE — PROGRESS NOTES
Normal thyroid function test.  Continue methimazole 2.5 mg daily. I am going to send refill to her pharmacy. Cholesterol test is showing bad cholesterol is okay but triglycerides are elevated again. Continue atorvastatin 40 mg but I am going to start another medication called fenofibrate to help with decreasing triglyceride level. Kidney function is unchanged.

## 2021-10-25 ENCOUNTER — TELEPHONE (OUTPATIENT)
Dept: ENDOCRINOLOGY | Age: 76
End: 2021-10-25

## 2021-10-25 NOTE — TELEPHONE ENCOUNTER
LVM for pt to call office back  ----- Message from Sydney Danielle MD sent at 10/21/2021  8:56 AM EDT -----  Normal thyroid function test.  Continue methimazole 2.5 mg daily. I am going to send refill to her pharmacy. Cholesterol test is showing bad cholesterol is okay but triglycerides are elevated again. Continue atorvastatin 40 mg but I am going to start another medication called fenofibrate to help with decreasing triglyceride level. Kidney function is unchanged.

## 2021-10-27 ENCOUNTER — TELEPHONE (OUTPATIENT)
Dept: ENDOCRINOLOGY | Age: 76
End: 2021-10-27

## 2021-10-27 NOTE — TELEPHONE ENCOUNTER
----- Message from Gracia Dias MD sent at 10/21/2021  8:56 AM EDT -----  Normal thyroid function test.  Continue methimazole 2.5 mg daily. I am going to send refill to her pharmacy. Cholesterol test is showing bad cholesterol is okay but triglycerides are elevated again. Continue atorvastatin 40 mg but I am going to start another medication called fenofibrate to help with decreasing triglyceride level. Kidney function is unchanged.

## 2021-10-28 ENCOUNTER — HOSPITAL ENCOUNTER (OUTPATIENT)
Dept: MAMMOGRAPHY | Age: 76
Discharge: HOME | End: 2021-10-28
Attending: INTERNAL MEDICINE
Payer: MEDICARE

## 2021-10-28 ENCOUNTER — HOSPITAL ENCOUNTER (OUTPATIENT)
Dept: ULTRASOUND IMAGING | Age: 76
Discharge: HOME | End: 2021-10-28
Attending: INTERNAL MEDICINE
Payer: MEDICARE

## 2021-10-28 DIAGNOSIS — E04.2 MULTIPLE THYROID NODULES: ICD-10-CM

## 2021-10-28 DIAGNOSIS — Z12.31 VISIT FOR SCREENING MAMMOGRAM: ICD-10-CM

## 2021-10-28 PROCEDURE — 76536 US EXAM OF HEAD AND NECK: CPT

## 2021-10-28 PROCEDURE — 77067 SCR MAMMO BI INCL CAD: CPT

## 2021-10-29 DIAGNOSIS — K11.20 SIALOADENITIS: Primary | ICD-10-CM

## 2021-10-29 DIAGNOSIS — K11.20 SIALADENITIS: ICD-10-CM

## 2021-10-29 DIAGNOSIS — K11.5 SIALOLITHIASIS OF SUBMANDIBULAR GLAND: ICD-10-CM

## 2021-10-29 RX ORDER — AMOXICILLIN AND CLAVULANATE POTASSIUM 500; 125 MG/1; MG/1
1 TABLET, FILM COATED ORAL 2 TIMES DAILY
Qty: 20 TABLET | Refills: 0 | Status: SHIPPED | OUTPATIENT
Start: 2021-10-29 | End: 2021-11-04 | Stop reason: SDUPTHER

## 2021-10-29 NOTE — PROGRESS NOTES
Spoke to patient. She has pain and tenderness in left submandibular area, this is not new but it comes on and off. She denies any fever or chills. Hurts when she eats. Explained to patient that she has inflammation of submandibular salivary gland as well as stones in the duct. I am going to send prescription for amoxicillin clavulanate for 10 days and referring her to ENT. Gave patient phone number to call ENT office on Monday. Patient expressed understanding.

## 2021-11-04 DIAGNOSIS — K11.20 SIALOADENITIS: ICD-10-CM

## 2021-11-04 RX ORDER — AMOXICILLIN AND CLAVULANATE POTASSIUM 500; 125 MG/1; MG/1
1 TABLET, FILM COATED ORAL 2 TIMES DAILY
Qty: 20 TABLET | Refills: 0 | Status: SHIPPED | OUTPATIENT
Start: 2021-11-04 | End: 2021-11-14

## 2021-11-19 ENCOUNTER — OFFICE VISIT (OUTPATIENT)
Dept: ENT CLINIC | Age: 76
End: 2021-11-19
Payer: MEDICARE

## 2021-11-19 VITALS
SYSTOLIC BLOOD PRESSURE: 130 MMHG | DIASTOLIC BLOOD PRESSURE: 80 MMHG | WEIGHT: 180 LBS | RESPIRATION RATE: 18 BRPM | HEIGHT: 65 IN | BODY MASS INDEX: 29.99 KG/M2 | TEMPERATURE: 98.2 F | OXYGEN SATURATION: 98 % | HEART RATE: 72 BPM

## 2021-11-19 DIAGNOSIS — K11.20 SIALOADENITIS OF SUBMANDIBULAR GLAND: Primary | ICD-10-CM

## 2021-11-19 DIAGNOSIS — E04.2 MULTINODULAR GOITER: ICD-10-CM

## 2021-11-19 DIAGNOSIS — R13.14 PHARYNGOESOPHAGEAL DYSPHAGIA: ICD-10-CM

## 2021-11-19 PROCEDURE — G8400 PT W/DXA NO RESULTS DOC: HCPCS | Performed by: OTOLARYNGOLOGY

## 2021-11-19 PROCEDURE — 99204 OFFICE O/P NEW MOD 45 MIN: CPT | Performed by: OTOLARYNGOLOGY

## 2021-11-19 PROCEDURE — G8752 SYS BP LESS 140: HCPCS | Performed by: OTOLARYNGOLOGY

## 2021-11-19 PROCEDURE — 1101F PT FALLS ASSESS-DOCD LE1/YR: CPT | Performed by: OTOLARYNGOLOGY

## 2021-11-19 PROCEDURE — 1090F PRES/ABSN URINE INCON ASSESS: CPT | Performed by: OTOLARYNGOLOGY

## 2021-11-19 PROCEDURE — G8754 DIAS BP LESS 90: HCPCS | Performed by: OTOLARYNGOLOGY

## 2021-11-19 PROCEDURE — G8417 CALC BMI ABV UP PARAM F/U: HCPCS | Performed by: OTOLARYNGOLOGY

## 2021-11-19 PROCEDURE — G9717 DOC PT DX DEP/BP F/U NT REQ: HCPCS | Performed by: OTOLARYNGOLOGY

## 2021-11-19 PROCEDURE — G8536 NO DOC ELDER MAL SCRN: HCPCS | Performed by: OTOLARYNGOLOGY

## 2021-11-19 PROCEDURE — G8427 DOCREV CUR MEDS BY ELIG CLIN: HCPCS | Performed by: OTOLARYNGOLOGY

## 2021-11-19 NOTE — PROGRESS NOTES
Visit Vitals  /80 (BP 1 Location: Left upper arm, BP Patient Position: Sitting, BP Cuff Size: Adult)   Pulse 72   Temp 98.2 °F (36.8 °C) (Temporal)   Resp 18   Ht 5' 4.5\" (1.638 m)   Wt 180 lb (81.6 kg)   SpO2 98%   BMI 30.42 kg/m²     Chief Complaint   Patient presents with    New Patient     possible salivary stones

## 2021-11-19 NOTE — PROGRESS NOTES
Subjective:    Malika Vick   68 y.o.   1945     New Patient Visit    Location - left neck    Quality - neck swelling    Severity -  moderate    Duration - years    Timing - intermittent    Context - pt with DM2, thyroid nodules, was sent for US thyroid found to have MNG but also sialolithiasis/adenitis left submandibular; she does c/o swelling and pain intermittently left submandibular also, some discomfort in throat when swallowing    Modifying Features - none    Associated symptoms/signs - dry mouth      Review of Systems  Review of Systems   Constitutional: Negative for chills and fever. HENT: Positive for sore throat. Negative for ear pain, hearing loss, nosebleeds and tinnitus. Eyes: Negative for blurred vision and double vision. Respiratory: Negative for cough, sputum production and shortness of breath. Cardiovascular: Negative for chest pain and palpitations. Gastrointestinal: Negative for heartburn, nausea and vomiting. Musculoskeletal: Positive for neck pain. Negative for joint pain. Skin: Negative. Neurological: Negative for dizziness, speech change, weakness and headaches. Endo/Heme/Allergies: Negative for environmental allergies. Does not bruise/bleed easily. Psychiatric/Behavioral: Negative for memory loss. The patient does not have insomnia.           Past Medical History:   Diagnosis Date    Adjustment disorder with depressed mood 4/10/2017    Adrenal adenoma 4/10/2017    Anxiety disorder 4/10/2017    Arthritis     Atherosclerosis     Backache 7/31/2020    Benign essential hypertension 7/31/2020    Breast cancer ([facility] [address]     Carcinoma in situ of breast 7/31/2020    Cataract 4/10/2017    Chronic kidney disease, stage 3 (moderate) 9/5/2019    Coronary arteriosclerosis 4/10/2017    Dehydration 7/31/2020    Depressive disorder 4/10/2017    Dyslipidemia 4/10/2017    Dyspnea 7/31/2020    Endocrine disorder     Gastroesophageal reflux disease 4/10/2017    Gout  Heart disease     Heartburn     Hirsutism 4/10/2017    History of chemotherapy     History of malignant neoplasm of breast 7/31/2020    HTN (hypertension)     Hyperkalemia 4/10/2017    Hyperlipidemia     Hypertensive disorder 7/31/2020    Hypertensive renal disease 7/31/2020    Hyperthyroidism     Insomnia 4/10/2017    Kidney disease     Lesion of skin of face 4/10/2017    Menopause     Mixed hyperlipidemia 9/5/2019    Mixed hyperlipidemia due to type 2 diabetes mellitus ([address] 7/31/2020    Radiation therapy complication     Subcutaneous mass of neck 3/25/2019    Thyroid disease     Thyroid nodule 9/5/2019    Tobacco user 4/10/2017    Type 2 diabetes mellitus with hyperglycemia ([address] 9/5/2019    Varicose vein of leg     Vitamin D deficiency 4/10/2017     Past Surgical History:   Procedure Laterality Date    HX BREAST BIOPSY  08/18/2011    BIOPSY/REMOVAL LYMPH NODES    HX CATARACT REMOVAL  11/22/2016    HX MASTECTOMY      HX OTHER SURGICAL  01/01/2004    Neck surgery    HX PARTIAL HYSTERECTOMY  01/01/1980    HX TONSILLECTOMY  01/01/1950    IR FINE NEEDLE ASPIRATION THYROID  12/21/2020    IR FINE NEEDLE ASPIRATION THYROID  12/21/2020    MA BREAST SURGERY PROCEDURE UNLISTED Right 01/20/2014    Drainage of fluid    MA BREAST SURGERY PROCEDURE UNLISTED  04/03/2013    BIOPSY BREAST PERCUT W/IMAGE    MA BREAST SURGERY PROCEDURE UNLISTED  07/26/2012    PLACE BREAST CLIP PERCUT    MA BREAST SURGERY PROCEDURE UNLISTED  07/26/2012    BIOPSY BREAST PERCUT W/DEVICE    MA BREAST SURGERY PROCEDURE UNLISTED  08/18/2011    BIOPSY BREAST PERCUT W/IMAGE    MA BREAST SURGERY PROCEDURE UNLISTED  08/11/2011    PLACE BREAST CLIP PERCUT    MA BREAST SURGERY PROCEDURE UNLISTED  08/11/2011    BIOPSY BREAST PERCUT W/DEVICE      Family History   Problem Relation Age of Onset    Gall Bladder Disease Mother     Diabetes Sister     Headache Sister     Hypertension Sister     Lung Disease Sister    [employer] Sister     Alcohol abuse Brother     Cancer Brother     Hypertension Brother     Alcohol abuse Maternal Aunt     Cancer Maternal Aunt     Diabetes Maternal Aunt     Breast Cancer Maternal Aunt     Alcohol abuse Maternal Uncle     Alcohol abuse Paternal Uncle     Diabetes Maternal Grandmother     Heart Disease Maternal Grandmother     Hypertension Maternal Grandmother     Asthma Child     Prostate Cancer Father     Breast Cancer Maternal Aunt      Social History     Tobacco Use    Smoking status: Current Every Day Smoker     Packs/day: 0.50     Years: 60.00     Pack years: 30.00    Smokeless tobacco: Never Used    Tobacco comment: 1/2 pack per day. Substance Use Topics    Alcohol use: No      Prior to Admission medications    Medication Sig Start Date End Date Taking? Authorizing Provider   methIMAzole (TAPAZOLE) 5 mg tablet Take 0.5 tablet everyday, 90 days 10/21/21   Fareed Ruth MD   fenofibrate nanocrystallized (TRICOR) 145 mg tablet Take 1 Tablet by mouth daily for 90 days. 10/21/21 1/19/22  Fareed Ruth MD   atorvastatin (LIPITOR) 40 mg tablet Take 1 Tablet by mouth nightly for 90 days.  10/21/21 1/19/22  Fareed Ruth MD   traZODone (DESYREL) 50 mg tablet TAKE 1 TABLET BY MOUTH EVERY DAY AT BEDTIME AS NEEDED FOR SLEEP 9/7/21   Provider, Historical   linaGLIPtin (Tradjenta) 5 mg tablet Take 1 tablet by mouth once daily for 30 days 10/20/21   Fareed Ruth MD   glucose blood VI test strips (OneTouch Verio test strips) strip Use to check glucose once a day 10/20/21   Fareed Ruth MD   lancets (One Touch Delica) 33 gauge misc USE 1  TO CHECK GLUCOSE TWICE DAILY  DX: E11.9 NPI: [id number] 10/20/21   Fareed Ruth MD   diclofenac (VOLTAREN) 1 % gel APPLY 2 GRAMS TOPICALLY TO AFFECTED AREA 4 TIMES DAILY 9/7/21   MADIHA Angelo   methIMAzole (TAPAZOLE) 5 mg tablet Take half tablet every day, 90 days 7/21/21   Fareed Ruth MD   allopurinoL (ZYLOPRIM) 100 mg tablet Take 2 tablets by mouth once daily 6/6/21   Philip Cevallos MD   FLUoxetine (PROzac) 20 mg capsule  1/20/21   Provider, Historical   cyanocobalamin (Vitamin B-12) 1,000 mcg tablet Take 1,000 mcg by mouth daily. Provider, Historical   omeprazole (PRILOSEC) 40 mg capsule Take 1 capsule by mouth once daily in the morning 1/18/21   Philip Cevallos MD   Colcrys 0.6 mg tablet Take 1 tablet by mouth once daily 1/10/21   Philip Cevallos MD   sodium bicarbonate 650 mg tablet TAKE 2 TABLETS BY MOUTH TWICE DAILY 11/13/20   Provider, Historical   albuterol (Ventolin HFA) 90 mcg/actuation inhaler Take 2 Puffs by inhalation every four (4) hours as needed for Wheezing. 10/22/20   Philip Cevallos MD   amLODIPine (NORVASC) 5 mg tablet Take 1 Tab by mouth daily. 10/22/20   Philip Cevallos MD   cholecalciferol, vitamin D3, (Vitamin D3) 50 mcg (2,000 unit) tab Take  by mouth. Provider, Historical   OneTouch Verio Flex meter misc USE TO CHECK BLOOD SUGAR TWICE A DAY    Provider, Historical   metoprolol succinate (TOPROL-XL) 25 mg XL tablet TAKE 1 2 (ONE HALF) TABLET BY MOUTH ONCE DAILY 9/26/19   Provider, Historical   turmeric root extract 500 mg cap Take 500 mg by mouth daily. Provider, Historical   vitamin E (AQUA GEMS) 400 unit capsule Take 400 Units by mouth daily. Provider, Historical   multivit-minerals/folic acid (ADULT MULTIVITAMIN GUMMIES PO) Take 2 Tabs by mouth daily. Provider, Historical   losartan (COZAAR) 100 mg tablet Take 100 mg by mouth daily. Provider, Historical   aspirin delayed-release 81 mg tablet Take 81 mg by mouth daily. Provider, Historical   atorvastatin (LIPITOR) 40 mg tablet Take 40 mg by mouth daily.     Provider, Historical        Allergies   Allergen Reactions    Iodinated Contrast Media Anaphylaxis    Codeine Itching         Objective:     Visit Vitals  /80 (BP 1 Location: Left upper arm, BP Patient Position: Sitting, BP Cuff Size: Adult)   Pulse 72   Temp 98.2 °F (36.8 °C) (Temporal)   Resp 18   Ht 5' 4.5\" (1.638 m)   Wt 180 lb (81.6 kg)   SpO2 98%   BMI 30.42 kg/m²        Physical Exam  Vitals reviewed. Constitutional:       General: She is awake. She is not in acute distress. Appearance: Normal appearance. She is well-groomed and normal weight. HENT:      Head: Normocephalic and atraumatic. Jaw: There is normal jaw occlusion. No trismus, tenderness or malocclusion. Salivary Glands: Right salivary gland is not diffusely enlarged or tender. Left salivary gland is diffusely enlarged and tender (mild). Right Ear: Hearing, tympanic membrane, ear canal and external ear normal.      Left Ear: Hearing, tympanic membrane, ear canal and external ear normal.      Ears:      Toscano exam findings: does not lateralize. Right Rinne: AC > BC. Left Rinne: AC > BC. Nose: No nasal deformity, septal deviation or mucosal edema. Right Nostril: No epistaxis. Left Nostril: No epistaxis. Right Turbinates: Not enlarged, swollen or pale. Left Turbinates: Not enlarged, swollen or pale. Right Sinus: No maxillary sinus tenderness or frontal sinus tenderness. Left Sinus: No maxillary sinus tenderness or frontal sinus tenderness. Mouth/Throat:      Lips: Pink. No lesions. Mouth: Mucous membranes are moist. No oral lesions. Dentition: Has dentures. No dental caries. Tongue: No lesions. Palate: No mass and lesions. Pharynx: Oropharynx is clear. Uvula midline. No oropharyngeal exudate or posterior oropharyngeal erythema. Tonsils: No tonsillar exudate. 0 on the right. 0 on the left. Comments: I can express saliva from left duct today  Eyes:      General: Vision grossly intact. Extraocular Movements: Extraocular movements intact. Right eye: No nystagmus. Left eye: No nystagmus. Conjunctiva/sclera: Conjunctivae normal.      Pupils: Pupils are equal, round, and reactive to light.    Neck: Thyroid: Thyromegaly present. No thyroid mass or thyroid tenderness. Trachea: Trachea and phonation normal. No tracheal tenderness or tracheal deviation. Cardiovascular:      Rate and Rhythm: Normal rate and regular rhythm. Pulmonary:      Effort: Pulmonary effort is normal. No respiratory distress. Breath sounds: No stridor. Musculoskeletal:         General: No swelling or tenderness. Normal range of motion. Cervical back: No edema or erythema. Lymphadenopathy:      Cervical: No cervical adenopathy. Skin:     General: Skin is warm and dry. Findings: No lesion or rash. Neurological:      General: No focal deficit present. Mental Status: She is alert and oriented to person, place, and time. Mental status is at baseline. Cranial Nerves: Cranial nerves are intact. Coordination: Romberg sign negative. Gait: Gait is intact. Psychiatric:         Mood and Affect: Mood normal.         Behavior: Behavior normal. Behavior is cooperative. Assessment/Plan:     Encounter Diagnoses   Name Primary?  Sialoadenitis of submandibular gland Yes    Multinodular goiter     Pharyngoesophageal dysphagia      Pt with left submandibular stones, reviewed her US. Also MNG. She is symptomatic will get a noncontrast CT to better assess, may need to consider surgery. She reports a remote hx of right neck surgery ? submax gland excision. Fu after CT    Biotene for dry mouth    Orders Placed This Encounter    CT NECK SOFT TISSUE WO CONT           Thank you for referring this patient,    Salbador Coburn MD, [address] [facility]    [address]  [facility]    [address] [id number]  [place], [place] [address] [facility] [address] [address] [address]

## 2021-11-19 NOTE — LETTER
11/19/2021    Patient: Anoop Hutchinson   YOB: 1945   Date of Visit: 11/19/2021     Kerrie Senior MD  [address] [place] [address]  Via Fax: [phone]     Aissatou Curry MD  [address] [address]  Via In [facility] [address]    Dear MD Aissatou Jewell MD,      Thank you for referring Ms. Anoop Hutchinson to [facility] [address] for evaluation. My notes for this consultation are attached. If you have questions, please do not hesitate to call me. I look forward to following your patient along with you.       Sincerely,    Alice Bragg MD

## 2021-12-03 ENCOUNTER — HOSPITAL ENCOUNTER (OUTPATIENT)
Dept: CT IMAGING | Age: 76
Discharge: HOME | End: 2021-12-03
Attending: OTOLARYNGOLOGY
Payer: MEDICARE

## 2021-12-03 DIAGNOSIS — K11.20 SIALOADENITIS OF SUBMANDIBULAR GLAND: ICD-10-CM

## 2021-12-03 DIAGNOSIS — E04.2 MULTINODULAR GOITER: ICD-10-CM

## 2021-12-03 PROCEDURE — 70490 CT SOFT TISSUE NECK W/O DYE: CPT

## 2021-12-09 NOTE — PROGRESS NOTES
I have called this patient twice at various phone numbers and left a message and the other number was disconnected. We will have to send her a letter.

## 2021-12-22 ENCOUNTER — TELEPHONE (OUTPATIENT)
Dept: ENT CLINIC | Age: 76
End: 2021-12-22

## 2021-12-22 NOTE — TELEPHONE ENCOUNTER
Pt called requesting to speak with Dr. Oscar Hendricks regarding her CT scan results. Pt would like a call from Dr. Oscar Hendricks whenever possible to discuss.      Best contact: [phone]

## 2021-12-28 ENCOUNTER — TELEPHONE (OUTPATIENT)
Dept: ENT CLINIC | Age: 76
End: 2021-12-28

## 2021-12-28 NOTE — TELEPHONE ENCOUNTER
Spoke with pt. Relayed results. Recommended left submandibular gland excision, and she wants to proceed.

## 2021-12-29 ENCOUNTER — TELEPHONE (OUTPATIENT)
Dept: ENT CLINIC | Age: 76
End: 2021-12-29

## 2022-01-10 ENCOUNTER — TELEPHONE (OUTPATIENT)
Dept: ENT CLINIC | Age: 77
End: 2022-01-10

## 2022-01-19 ENCOUNTER — OFFICE VISIT (OUTPATIENT)
Dept: ENDOCRINOLOGY | Age: 77
End: 2022-01-19
Payer: MEDICARE

## 2022-01-19 VITALS
OXYGEN SATURATION: 95 % | TEMPERATURE: 97.8 F | WEIGHT: 177.4 LBS | SYSTOLIC BLOOD PRESSURE: 125 MMHG | HEIGHT: 65 IN | BODY MASS INDEX: 29.56 KG/M2 | HEART RATE: 82 BPM | DIASTOLIC BLOOD PRESSURE: 66 MMHG

## 2022-01-19 DIAGNOSIS — E05.90 SUBCLINICAL HYPERTHYROIDISM: ICD-10-CM

## 2022-01-19 DIAGNOSIS — E11.65 TYPE 2 DIABETES MELLITUS WITH HYPERGLYCEMIA, WITHOUT LONG-TERM CURRENT USE OF INSULIN (HCC): Primary | ICD-10-CM

## 2022-01-19 DIAGNOSIS — E78.2 MIXED HYPERLIPIDEMIA: ICD-10-CM

## 2022-01-19 LAB — GLUCOSE POC: 169 MG/DL

## 2022-01-19 PROCEDURE — G9717 DOC PT DX DEP/BP F/U NT REQ: HCPCS | Performed by: INTERNAL MEDICINE

## 2022-01-19 PROCEDURE — G8427 DOCREV CUR MEDS BY ELIG CLIN: HCPCS | Performed by: INTERNAL MEDICINE

## 2022-01-19 PROCEDURE — 1090F PRES/ABSN URINE INCON ASSESS: CPT | Performed by: INTERNAL MEDICINE

## 2022-01-19 PROCEDURE — 1101F PT FALLS ASSESS-DOCD LE1/YR: CPT | Performed by: INTERNAL MEDICINE

## 2022-01-19 PROCEDURE — G8752 SYS BP LESS 140: HCPCS | Performed by: INTERNAL MEDICINE

## 2022-01-19 PROCEDURE — G8536 NO DOC ELDER MAL SCRN: HCPCS | Performed by: INTERNAL MEDICINE

## 2022-01-19 PROCEDURE — G8754 DIAS BP LESS 90: HCPCS | Performed by: INTERNAL MEDICINE

## 2022-01-19 PROCEDURE — G8400 PT W/DXA NO RESULTS DOC: HCPCS | Performed by: INTERNAL MEDICINE

## 2022-01-19 PROCEDURE — 82962 GLUCOSE BLOOD TEST: CPT | Performed by: INTERNAL MEDICINE

## 2022-01-19 PROCEDURE — G8417 CALC BMI ABV UP PARAM F/U: HCPCS | Performed by: INTERNAL MEDICINE

## 2022-01-19 PROCEDURE — 99214 OFFICE O/P EST MOD 30 MIN: CPT | Performed by: INTERNAL MEDICINE

## 2022-01-19 RX ORDER — ATORVASTATIN CALCIUM 40 MG/1
40 TABLET, FILM COATED ORAL
Qty: 90 TABLET | Refills: 2 | Status: SHIPPED | OUTPATIENT
Start: 2022-01-19 | End: 2022-07-20 | Stop reason: SDUPTHER

## 2022-01-19 RX ORDER — METHIMAZOLE 5 MG/1
TABLET ORAL
Qty: 45 TABLET | Refills: 2 | Status: SHIPPED | OUTPATIENT
Start: 2022-01-19 | End: 2022-02-11

## 2022-01-19 RX ORDER — LINAGLIPTIN 5 MG/1
TABLET, FILM COATED ORAL
Qty: 90 TABLET | Refills: 1 | Status: SHIPPED | OUTPATIENT
Start: 2022-01-19 | End: 2022-04-20 | Stop reason: SDUPTHER

## 2022-01-19 RX ORDER — FENOFIBRATE 145 MG/1
145 TABLET, COATED ORAL DAILY
Qty: 90 TABLET | Refills: 2 | Status: SHIPPED | OUTPATIENT
Start: 2022-01-19 | End: 2022-04-19

## 2022-01-19 NOTE — LETTER
1/19/2022    Patient: Yana Rothman   YOB: 1945   Date of Visit: 1/19/2022     Cecille Malone MD  [address] [place] [address]  Via Fax: [phone]    Dear Cecille Malone MD,      Thank you for referring Ms. Yana Rothman to [place] [address] for evaluation. My notes for this consultation are attached. If you have questions, please do not hesitate to call me. I look forward to following your patient along with you.       Sincerely,    Lashonda Gibson MD

## 2022-01-19 NOTE — PROGRESS NOTES
History and Physical    Patient: Sariah Li MRN: [id number]  SSN: [id number]    YOB: 1945  Age: 68 y.o. Sex: female      Subjective:      Sariah Li is a 68 y.o. female with past medical history of hypertension, hyperlipidemia, chronic kidney disease stage III, depression, breast cancer status post surgery, radiation is sent to me by primary care physician Dr. Sarina Adames for type 2 diabetes mellitus. patient is a poor historian. At the last visit we continue Tradjenta 5 mg daily. Glipizide was stopped a few months back because of possible hypoglycemia. Patient is reporting compliance with medications. She has been noncompliant with diabetic diet, eating more sweets than usual.  She checks blood glucose once a day. Denies any hypoglycemia. Did not bring glucometer today. Lost 3 pounds since last visit. Glucometer reading: Did not bring glucometer today    uupdated diabetes history:  · Diagnosis: 4 years    · Current treatment: Tradjenta 5 mg daily    · Past treatment: glimepiride (hypoglycemia,, chronic kidney disease stage III), glipizide (?hypoglycemia)     · Glucose checks: once a day    · Hyperglycemia: no    · Hypoglycemia: no    · Meals per day: 2, brunch: cheese, cereal etc, dinner: cabbage, or fast food, snacks: crackers     · Exercise: no    · DM related hospitalizations: no        Complications of DM:    · CAD: no    · CVA: no    · PVD: no    · Amputations: no     · Retinopathy: no; last exam was 6-2021    · Gastropathy: no    · Nephropathy: yes    · Neuropathy: no        Medications:    · Statin: atorvastatin    · ACE-I: losartan    · ASA: yes        · Diabetes education: no    Subclinical hyperthyroidism:  Initially noted in labs in September 2016. Started on methimazole 2.5 mg daily in June 2017. Patient was previously seeing endocrinologist Dr. Tony Martínez for this. Patient tells me that recently methimazole dose was increased from 2.5 mg to 5 mg. Patient does not know why this was done but she tells she has been taking this for less than 1 month. Energy level is poor, which is not new, sleep is poor, again not new. Bowel movements regular, no heat or cold intolerance, no palpitations. Labs done after the last visit came back showing low free T4 and normal TSH. At that time methimazole was decreased back to 2.5 mg daily. However, patient has not noticed any change in her energy level or sleep. Continues to have poor sleep. Labs done after the last visit showed normal TSH. So we continued methimazole 2.5 mg daily. She has lost 3 pounds since last visit 3 months back. She is having trouble sleeping at night. Denies any palpitations or tremors. Labs done after the last visit showed normal TSH so we continued methimazole 2.5 mg daily. Multinodular goiter:  Thyroid ultrasound August 2019:  Right lobe has 4.5 x 2.4 x 4 cm complex cyst    Left lobe has 1.2 x 0.9 x 1 cm solid nodule in the upper part,  1.6 x 1.7 x 1.1 cm cystic and solid nodule in the mid region  1.8 x 1.3 x 2.1 cm solid nodule in the inferior region    Right lobe complex nodule and left lobe 2.1 cm nodules were biopsied in October 2019. Right lobe nodule resulted as benign, left lobe nodule insufficient cells. Patient denies any difficulty in swallowing but she feels like the nodules in her neck are increasing in size. No family history of thyroid cancer. Patient had breast cancer. Thyroid ultrasound done in December 2020 came back showing right lobe 3.5 cm nodule and left lobe 2.5 cm nodule which met criteria for biopsy. This nodule was biopsied and it was resulted as benign. Patient is reporting some difficulty in swallowing when she lays down. However, this is not new. She is noticing enlarged lymph node in the submandibular area which is tender to palpation, which appears on and off. Denies any dental infections.     Last ultrasound in October 2021:  Showed several nodules in the thyroid, none of them meet criteria for biopsy. It showed enlarged left submandibular salivary gland with edema and swelling and possible calculus. Patient was treated with antibiotics for 10 days and referred to ENT.     Past Medical History:   Diagnosis Date    Adjustment disorder with depressed mood 4/10/2017    Adrenal adenoma 4/10/2017    Anxiety disorder 4/10/2017    Arthritis     Atherosclerosis     Backache 7/31/2020    Benign essential hypertension 7/31/2020    Breast cancer ([address]     Carcinoma in situ of breast 7/31/2020    Cataract 4/10/2017    Chronic kidney disease, stage 3 (moderate) 9/5/2019    Coronary arteriosclerosis 4/10/2017    Dehydration 7/31/2020    Depressive disorder 4/10/2017    Dyslipidemia 4/10/2017    Dyspnea 7/31/2020    Endocrine disorder     Gastroesophageal reflux disease 4/10/2017    Gout     Heart disease     Heartburn     Hirsutism 4/10/2017    History of chemotherapy     History of malignant neoplasm of breast 7/31/2020    HTN (hypertension)     Hyperkalemia 4/10/2017    Hyperlipidemia     Hypertensive disorder 7/31/2020    Hypertensive renal disease 7/31/2020    Hyperthyroidism     Insomnia 4/10/2017    Kidney disease     Lesion of skin of face 4/10/2017    Menopause     Mixed hyperlipidemia 9/5/2019    Mixed hyperlipidemia due to type 2 diabetes mellitus ([address] 7/31/2020    Radiation therapy complication     Stone of salivary gland 12/2021    Subcutaneous mass of neck 3/25/2019    Thyroid disease     Thyroid nodule 9/5/2019    Tobacco user 4/10/2017    Type 2 diabetes mellitus with hyperglycemia ([address] 9/5/2019    Varicose vein of leg     Vitamin D deficiency 4/10/2017     Past Surgical History:   Procedure Laterality Date    HX BREAST BIOPSY  08/18/2011    BIOPSY/REMOVAL LYMPH NODES    HX CATARACT REMOVAL  11/22/2016    HX MASTECTOMY      HX OTHER SURGICAL  01/01/2004    Neck surgery    HX PARTIAL HYSTERECTOMY 01/01/1980    HX TONSILLECTOMY  01/01/1950    IR FINE NEEDLE ASPIRATION THYROID  12/21/2020    IR FINE NEEDLE ASPIRATION THYROID  12/21/2020    OK BREAST SURGERY PROCEDURE UNLISTED Right 01/20/2014    Drainage of fluid    OK BREAST SURGERY PROCEDURE UNLISTED  04/03/2013    BIOPSY BREAST PERCUT W/IMAGE    OK BREAST SURGERY PROCEDURE UNLISTED  07/26/2012    PLACE BREAST CLIP PERCUT    OK BREAST SURGERY PROCEDURE UNLISTED  07/26/2012    BIOPSY BREAST PERCUT W/DEVICE    OK BREAST SURGERY PROCEDURE UNLISTED  08/18/2011    BIOPSY BREAST PERCUT W/IMAGE    OK BREAST SURGERY PROCEDURE UNLISTED  08/11/2011    PLACE BREAST CLIP PERCUT    OK BREAST SURGERY PROCEDURE UNLISTED  08/11/2011    BIOPSY BREAST PERCUT W/DEVICE      Family History   Problem Relation Age of Onset    Gall Bladder Disease Mother     Diabetes Sister     Headache Sister     Hypertension Sister     Lung Disease Sister    Dukes Migraines Sister     Alcohol abuse Brother     Cancer Brother     Hypertension Brother     Alcohol abuse Maternal Aunt     Cancer Maternal Aunt     Diabetes Maternal Aunt     Breast Cancer Maternal Aunt     Alcohol abuse Maternal Uncle     Alcohol abuse Paternal Uncle     Diabetes Maternal Grandmother     Heart Disease Maternal Grandmother     Hypertension Maternal Grandmother     Asthma Child     Prostate Cancer Father     Breast Cancer Maternal Aunt      Social History     Tobacco Use    Smoking status: Current Every Day Smoker     Packs/day: 0.50     Years: 60.00     Pack years: 30.00    Smokeless tobacco: Never Used    Tobacco comment: 1/2 pack per day. Substance Use Topics    Alcohol use: No      Prior to Admission medications    Medication Sig Start Date End Date Taking? Authorizing Provider   methIMAzole (TAPAZOLE) 5 mg tablet Take 0.5 tablet everyday, 90 days 1/19/22  Yes Ale Rodriguez MD   fenofibrate nanocrystallized (TRICOR) 145 mg tablet Take 1 Tablet by mouth daily for 90 days.  1/19/22 4/19/22 Yes Adria Cleaning MD   atorvastatin (LIPITOR) 40 mg tablet Take 1 Tablet by mouth nightly for 90 days. 1/19/22 4/19/22 Yes Adria Cleaning MD   linaGLIPtin (Tradjenta) 5 mg tablet Take 1 tablet by mouth once daily for 30 days 1/19/22  Yes Adria Cleaning MD   traZODone (DESYREL) 50 mg tablet TAKE 1 TABLET BY MOUTH EVERY DAY AT BEDTIME AS NEEDED FOR SLEEP 9/7/21  Yes Provider, Historical   glucose blood VI test strips (OneTouch Verio test strips) strip Use to check glucose once a day 10/20/21  Yes Adria Cleaning MD   lancets (One Touch Delica) 33 gauge misc USE 1  TO CHECK GLUCOSE TWICE DAILY  DX: E11.9 NPI: [id number] 10/20/21  Yes Adria Cleaning MD   diclofenac (VOLTAREN) 1 % gel APPLY 2 GRAMS TOPICALLY TO AFFECTED AREA 4 TIMES DAILY 9/7/21  Yes MADIHA Castro   methIMAzole (TAPAZOLE) 5 mg tablet Take half tablet every day, 90 days 7/21/21  Yes Adria Cleaning MD   allopurinoL (ZYLOPRIM) 100 mg tablet Take 2 tablets by mouth once daily 6/6/21  Yes Suzanna Villanueva MD   FLUoxetine (PROzac) 20 mg capsule  1/20/21  Yes Provider, Historical   cyanocobalamin (Vitamin B-12) 1,000 mcg tablet Take 1,000 mcg by mouth daily. Yes Provider, Historical   omeprazole (PRILOSEC) 40 mg capsule Take 1 capsule by mouth once daily in the morning 1/18/21  Yes Suzanna Villanueva MD   Colcrys 0.6 mg tablet Take 1 tablet by mouth once daily 1/10/21  Yes Suzanna Villanueva MD   sodium bicarbonate 650 mg tablet TAKE 2 TABLETS BY MOUTH TWICE DAILY 11/13/20  Yes Provider, Historical   albuterol (Ventolin HFA) 90 mcg/actuation inhaler Take 2 Puffs by inhalation every four (4) hours as needed for Wheezing. 10/22/20  Yes Suzanna Villanueva MD   amLODIPine (NORVASC) 5 mg tablet Take 1 Tab by mouth daily. 10/22/20  Yes Suzanna Villanueva MD   cholecalciferol, vitamin D3, (Vitamin D3) 50 mcg (2,000 unit) tab Take  by mouth.    Yes Provider, Historical   OneTouch Verio Flex meter misc USE TO CHECK BLOOD SUGAR TWICE A DAY   Yes Provider, Historical   metoprolol succinate (TOPROL-XL) 25 mg XL tablet TAKE 1 2 (ONE HALF) TABLET BY MOUTH ONCE DAILY 9/26/19  Yes Provider, Historical   turmeric root extract 500 mg cap Take 500 mg by mouth daily. Yes Provider, Historical   vitamin E (AQUA GEMS) 400 unit capsule Take 400 Units by mouth daily. Yes Provider, Historical   multivit-minerals/folic acid (ADULT MULTIVITAMIN GUMMIES PO) Take 2 Tabs by mouth daily. Yes Provider, Historical   losartan (COZAAR) 100 mg tablet Take 100 mg by mouth daily. Yes Provider, Historical   aspirin delayed-release 81 mg tablet Take 81 mg by mouth daily. Yes Provider, Historical   atorvastatin (LIPITOR) 40 mg tablet Take 40 mg by mouth daily. Yes Provider, Historical        Allergies   Allergen Reactions    Iodinated Contrast Media Anaphylaxis    Codeine Itching       Review of Systems:  ROS    A comprehensive review of systems was preformed and it is negative except night sweats, weight changes, heat sensitivity, cold sensitivity, fatigue, neck swelling, swelling of feet, irregular heartbeat, anxiety, sleep problems. Objective:     Vitals:    01/19/22 1058   BP: 125/66   Pulse: 82   Temp: 97.8 °F (36.6 °C)   TempSrc: Temporal   SpO2: 95%   Weight: 177 lb 6.4 oz (80.5 kg)   Height: 5' 4.5\" (1.638 m)        Physical Exam:    Physical Exam  Vitals and nursing note reviewed. Constitutional:       Appearance: Normal appearance. HENT:      Head: Normocephalic and atraumatic. Neck:      Comments: No cervical lymphadenopathy  Cardiovascular:      Rate and Rhythm: Normal rate and regular rhythm. Pulmonary:      Effort: Pulmonary effort is normal.      Breath sounds: Normal breath sounds. Neurological:      Mental Status: She is alert. Labs and Imaging:  Results for Yolette Castillo (MRN [id number]) as of 2/17/2021 14:51   Ref.  Range 9/18/2017 00:00 8/11/2020 13:43 10/21/2020 12:29 1/6/2021 14:18   T4, Free Latest Ref Range: 0.82 - 1.77 ng/dL 0.51 (L) 0.81 (L) 0.86 0.69 (L)   TSH Latest Ref Range: 0.450 - 4.500 uIU/mL 1.800  0.788 4.350       Thyroid US 12-:  FINDINGS: Thyroid ultrasound. Right lobe 3.4 x 2.5 x 4.5 cm. Left lobe enlarged  5.3 x 2.7 x 3.0 cm. Isthmus thickened 0.6 cm.     Right lobe complex cyst versus solid nodule measures approximately 3.5 x 1.8 x  2.8 cm. TI-RADS 3. Recommend FNA.     Left lobe contains multiple nodules:  Left upper lobe anterior isoechoic well-defined 1.7 x 1.2 x by 1.4 cm. It is  difficult to tell if this nodule has microcalcifications. TI-RADS 3-4. Consider  FNA. Left lobe posterior complex cystic and solid 2.5 x 1.5 x 1.6 cm equivocally  taller rather than wide. TI-RADS 3-4. Recommend FNA. Anterior complex cyst 1.5 x 1.2 x 2.3 cm. TI-RADS 3. Recommend annual  sonographic follow-up.   Last 3 Recorded Weights in this Encounter    01/19/22 1058   Weight: 177 lb 6.4 oz (80.5 kg)        Lab Results   Component Value Date/Time    Hemoglobin A1c 6.1 (H) 10/13/2021 10:56 AM    Hemoglobin A1c, External 6.7 03/04/2020 12:00 AM    Hemoglobin A1c, External 6.8 01/26/2018 12:00 AM        Assessment:     Patient Active Problem List   Diagnosis Code    Chronic tension headache G44.229    Medication overuse headache G44.40    Chronic non-specific white matter lesions on MRI G93.9    Mild cerebral atrophy (HCC) G31.9    Subclinical hyperthyroidism E05.90    Coarse tremors G25.2    Severe obesity (HCC) E66.01    Adjustment disorder with depressed mood F43.21    Adrenal adenoma D35.00    Anxiety disorder F41.9    Cataract H26.9    Stage 3 chronic kidney disease N18.30    Coronary arteriosclerosis I25.10    Depressive disorder F32.9    Dyslipidemia E78.5    Gastroesophageal reflux disease K21.9    Hirsutism L68.0    Hyperkalemia E87.5    Insomnia G47.00    Mixed hyperlipidemia E78.2    Thyroid nodule E04.1    Type 2 diabetes mellitus with hyperglycemia (HCC) E11.65    Vitamin D deficiency E55.9    Tobacco user Z72.0    Lesion of skin of face L98.9    Subcutaneous mass of neck R22.1    Backache M54.9    Carcinoma in situ of breast D05.90    Dehydration E86.0    Dyspnea R06.00    History of malignant neoplasm of breast Z85.3    Hypertensive disorder I10    Hypertensive renal disease I12.9    Mixed hyperlipidemia due to type 2 diabetes mellitus (HCC) E11.69, E78.2    Heartburn R12    Kidney disease N28.9    Headache disorder R51.9    Gout M10.9    Tremor R25.1    Sialoadenitis of submandibular gland K11.20    Polyp of colon K63.5    Type 2 diabetes mellitus with hyperglycemia, without long-term current use of insulin (HCC) E11.65    Multinodular goiter E04.2           Plan:     type II diabetes mellitus well controlled  Hemoglobin A1c was 7% in April 2019, 6.5% on 9-5-2019, 6.3% on 12-5-2019, 6.7% in 3-2020, 6.7% on 12-2-2020, 6.4% on 4-, 6.4% on 7-, 6.1 on 10-, 6.2% today. Fingerstick blood glucose is 169 mg/dL in my office today. Up to date with diabetes related annual labs: yes 10-    Up to date with diabetic eye exam: 6-2021    plan:  Glucose control looks acceptable. Continue Tradjenta 5 mg daily. Check blood glucose once a day and I will see her back in 3 months. hypertensive renal disease  blood pressure well controlled on current medications. Continue the same. chronic kidney disease stage 3  GFR 38 in August 2019, 37 in May 2021, 36 in October 2021.    hyperthyroidism  on methimazole 2.5 mg daily. TSH was normal in August 2019 and October 2020, free T4 was low. However, methimazole was increased to 5 mg daily in November 2020.  1-6-2021:  TSH normal at 4.35  Free T4 low at 0.69  Methimazole was decreased to 2.5 mg daily    4-:  TSH normal at 0.532  Free T4 normal at 0.98  Methimazole 2.5 mg daily was continued  Pulse rate is elevated today, patient is having trouble sleeping.     10-:  TSH normal at 2.12  Methimazole 2.5 mg daily was continued  Plan:  Repeat TSH in 6 months  Advised patient to talk to PCP about insomnia because it is not related to her thyroid. mixed hyperlipidemia  LDL was 66 in April 2019, 53 in January 2021, 10-: Total cholesterol 188, triglycerides 349, LDL 97. Continue atorvastatin 40 mg daily and patient was started on fenofibrate 145 mg daily. I will recheck lipid profile in a few months. thyroid nodule  Thyroid ultrasound 12-: Right lobe 3.5 and left lobe 2.5 cm TR 4 nodules meet criteria for biopsy. Thyroid biopsy of these nodules on 12- were resulted as benign. Thyroid ultrasound 10-: Right lobe midpole 2.1 x 1.8 cm cystic nodule consistent with colloid cyst.  Right lobe midpole mixed solid and cystic isoechoic nodule 1.3 x 0.7 cm, TR 2  Right lower lobe isoechoic solid nodule 1.8 x 1.6 cm, TR 3  Left lobe:  Left upper lobe mixed solid and cystic isoechoic nodule 1.7 x 1.4 cm, TR 2  Left mid lobe isoechoic solid and cystic nodule 1.9 x 1.7 cm, TR 2  Left lower lobe isoechoic solid and cystic nodule 2.2 x 2.2 cm, TR 2  Left medial midpole isoechoic mixed solid and cystic nodule 1.4 x 1.4 cm, TR 2  No indication for repeat biopsy  Plan:  Repeat ultrasound in 1 year. history of malignant neoplasm of breast  status post surgery, radiation. Now on letrozole. Left submandibular gland sialolithiasis and sialadenitis: This was incidentally seen and thyroid ultrasound from October 2021. She was treated with antibiotics for 10 days. CT neck showed calculus at the hilum of left submandibular gland. ENT spoke to patient about surgery and she wants to proceed.     Orders Placed This Encounter    AMB POC GLUCOSE BLOOD, BY GLUCOSE MONITORING DEVICE    AMB POC HEMOGLOBIN A1C    methIMAzole (TAPAZOLE) 5 mg tablet     Sig: Take 0.5 tablet everyday, 90 days     Dispense:  45 Tablet     Refill:  2    fenofibrate nanocrystallized (TRICOR) 145 mg tablet     Sig: Take 1 Tablet by mouth daily for 90 days. Dispense:  90 Tablet     Refill:  2    atorvastatin (LIPITOR) 40 mg tablet     Sig: Take 1 Tablet by mouth nightly for 90 days.      Dispense:  90 Tablet     Refill:  2    linaGLIPtin (Tradjenta) 5 mg tablet     Sig: Take 1 tablet by mouth once daily for 30 days     Dispense:  90 Tablet     Refill:  1     DC glipizide        Signed By: Liz Rhodes MD     January 19, 2022      Return to clinic 3 months

## 2022-01-25 ENCOUNTER — OFFICE VISIT (OUTPATIENT)
Dept: ENT CLINIC | Age: 77
End: 2022-01-25
Payer: MEDICARE

## 2022-01-25 VITALS
WEIGHT: 177 LBS | BODY MASS INDEX: 29.49 KG/M2 | RESPIRATION RATE: 18 BRPM | OXYGEN SATURATION: 98 % | HEIGHT: 65 IN | TEMPERATURE: 98.3 F | SYSTOLIC BLOOD PRESSURE: 124 MMHG | DIASTOLIC BLOOD PRESSURE: 78 MMHG | HEART RATE: 74 BPM

## 2022-01-25 DIAGNOSIS — K11.20 SIALOADENITIS OF SUBMANDIBULAR GLAND: Primary | ICD-10-CM

## 2022-01-25 DIAGNOSIS — E04.2 MULTINODULAR GOITER: ICD-10-CM

## 2022-01-25 DIAGNOSIS — K11.7 XEROSTOMIA: ICD-10-CM

## 2022-01-25 PROCEDURE — G9717 DOC PT DX DEP/BP F/U NT REQ: HCPCS | Performed by: OTOLARYNGOLOGY

## 2022-01-25 PROCEDURE — G8754 DIAS BP LESS 90: HCPCS | Performed by: OTOLARYNGOLOGY

## 2022-01-25 PROCEDURE — G8752 SYS BP LESS 140: HCPCS | Performed by: OTOLARYNGOLOGY

## 2022-01-25 PROCEDURE — 1090F PRES/ABSN URINE INCON ASSESS: CPT | Performed by: OTOLARYNGOLOGY

## 2022-01-25 PROCEDURE — G8427 DOCREV CUR MEDS BY ELIG CLIN: HCPCS | Performed by: OTOLARYNGOLOGY

## 2022-01-25 PROCEDURE — 1101F PT FALLS ASSESS-DOCD LE1/YR: CPT | Performed by: OTOLARYNGOLOGY

## 2022-01-25 PROCEDURE — G8400 PT W/DXA NO RESULTS DOC: HCPCS | Performed by: OTOLARYNGOLOGY

## 2022-01-25 PROCEDURE — 99214 OFFICE O/P EST MOD 30 MIN: CPT | Performed by: OTOLARYNGOLOGY

## 2022-01-25 PROCEDURE — G8417 CALC BMI ABV UP PARAM F/U: HCPCS | Performed by: OTOLARYNGOLOGY

## 2022-01-25 PROCEDURE — G8536 NO DOC ELDER MAL SCRN: HCPCS | Performed by: OTOLARYNGOLOGY

## 2022-01-25 NOTE — PROGRESS NOTES
Visit Vitals  /78 (BP 1 Location: Left upper arm, BP Patient Position: Sitting, BP Cuff Size: Adult)   Pulse 74   Temp 98.3 °F (36.8 °C) (Temporal)   Resp 18   Ht 5' 4.5\" (1.638 m)   Wt 177 lb (80.3 kg)   SpO2 98%   BMI 29.91 kg/m²     Chief Complaint   Patient presents with    Pre-op Exam

## 2022-01-25 NOTE — H&P (VIEW-ONLY)
Subjective:    Pushpa Tonya   68 y.o.   1945     Followup Visit    Location - left neck    Quality - neck swelling    Severity -  moderate    Duration - years    Timing - intermittent    Context - pt with DM2, thyroid nodules, was sent for US thyroid found to have MNG but also sialolithiasis/adenitis left submandibular; she does c/o swelling and pain intermittently left submandibular also, some discomfort in throat when swallowing    Modifying Features - none    Associated symptoms/signs - dry mouth    1/25/22 - fu today preop for left submax gland excision; pt still c/o aching left neck in the area of gland; also dry mouth    Review of Systems  Review of Systems   Constitutional: Negative for chills and fever. HENT: Positive for sore throat. Negative for ear pain, hearing loss, nosebleeds and tinnitus. Eyes: Negative for blurred vision and double vision. Respiratory: Negative for cough, sputum production and shortness of breath. Cardiovascular: Negative for chest pain and palpitations. Gastrointestinal: Negative for heartburn, nausea and vomiting. Musculoskeletal: Positive for neck pain. Negative for joint pain. Skin: Negative. Neurological: Negative for dizziness, speech change, weakness and headaches. Endo/Heme/Allergies: Negative for environmental allergies. Does not bruise/bleed easily. Psychiatric/Behavioral: Negative for memory loss. The patient does not have insomnia.           Past Medical History:   Diagnosis Date    Adjustment disorder with depressed mood 4/10/2017    Adrenal adenoma 4/10/2017    Anxiety disorder 4/10/2017    Arthritis     Atherosclerosis     Backache 7/31/2020    Benign essential hypertension 7/31/2020    Breast cancer ([facility] [address]     Carcinoma in situ of breast 7/31/2020    Cataract 4/10/2017    Chronic kidney disease, stage 3 (moderate) 9/5/2019    Coronary arteriosclerosis 4/10/2017    Dehydration 7/31/2020    Depressive disorder 4/10/2017    Dyslipidemia 4/10/2017    Dyspnea 7/31/2020    Endocrine disorder     Gastroesophageal reflux disease 4/10/2017    Gout     Heart disease     Heartburn     Hirsutism 4/10/2017    History of chemotherapy     History of malignant neoplasm of breast 7/31/2020    HTN (hypertension)     Hyperkalemia 4/10/2017    Hyperlipidemia     Hypertensive disorder 7/31/2020    Hypertensive renal disease 7/31/2020    Hyperthyroidism     Insomnia 4/10/2017    Kidney disease     Lesion of skin of face 4/10/2017    Menopause     Mixed hyperlipidemia 9/5/2019    Mixed hyperlipidemia due to type 2 diabetes mellitus ([address] 7/31/2020    Radiation therapy complication     Stone of salivary gland 12/2021    Subcutaneous mass of neck 3/25/2019    Thyroid disease     Thyroid nodule 9/5/2019    Tobacco user 4/10/2017    Type 2 diabetes mellitus with hyperglycemia ([address] 9/5/2019    Varicose vein of leg     Vitamin D deficiency 4/10/2017     Past Surgical History:   Procedure Laterality Date    HX BREAST BIOPSY  08/18/2011    BIOPSY/REMOVAL LYMPH NODES    HX CATARACT REMOVAL  11/22/2016    HX MASTECTOMY      HX OTHER SURGICAL  01/01/2004    Neck surgery    HX PARTIAL HYSTERECTOMY  01/01/1980    HX TONSILLECTOMY  01/01/1950    IR FINE NEEDLE ASPIRATION THYROID  12/21/2020    IR FINE NEEDLE ASPIRATION THYROID  12/21/2020    MI BREAST SURGERY PROCEDURE UNLISTED Right 01/20/2014    Drainage of fluid    MI BREAST SURGERY PROCEDURE UNLISTED  04/03/2013    BIOPSY BREAST PERCUT W/IMAGE    MI BREAST SURGERY PROCEDURE UNLISTED  07/26/2012    PLACE BREAST CLIP PERCUT    MI BREAST SURGERY PROCEDURE UNLISTED  07/26/2012    BIOPSY BREAST PERCUT W/DEVICE    MI BREAST SURGERY PROCEDURE UNLISTED  08/18/2011    BIOPSY BREAST PERCUT W/IMAGE    MI BREAST SURGERY PROCEDURE UNLISTED  08/11/2011    PLACE BREAST CLIP PERCUT    MI BREAST SURGERY PROCEDURE UNLISTED  08/11/2011    BIOPSY BREAST PERCUT W/DEVICE      Family History Problem Relation Age of Onset    Gall Bladder Disease Mother     Diabetes Sister     Headache Sister     Hypertension Sister     Lung Disease Sister    Raina Stephane Migraines Sister     Alcohol abuse Brother     Cancer Brother     Hypertension Brother     Alcohol abuse Maternal Aunt     Cancer Maternal Aunt     Diabetes Maternal Aunt     Breast Cancer Maternal Aunt     Alcohol abuse Maternal Uncle     Alcohol abuse Paternal Uncle     Diabetes Maternal Grandmother     Heart Disease Maternal Grandmother     Hypertension Maternal Grandmother     Asthma Child     Prostate Cancer Father     Breast Cancer Maternal Aunt      Social History     Tobacco Use    Smoking status: Current Every Day Smoker     Packs/day: 0.50     Years: 60.00     Pack years: 30.00    Smokeless tobacco: Never Used    Tobacco comment: 1/2 pack per day. Substance Use Topics    Alcohol use: No      Prior to Admission medications    Medication Sig Start Date End Date Taking? Authorizing Provider   methIMAzole (TAPAZOLE) 5 mg tablet Take 0.5 tablet everyday, 90 days 1/19/22   Елена Street MD   fenofibrate nanocrystallized (TRICOR) 145 mg tablet Take 1 Tablet by mouth daily for 90 days. 1/19/22 4/19/22  Елена Street MD   atorvastatin (LIPITOR) 40 mg tablet Take 1 Tablet by mouth nightly for 90 days.  1/19/22 4/19/22  Елена Street MD   linaGLIPtin (Tradjenta) 5 mg tablet Take 1 tablet by mouth once daily for 30 days 1/19/22   Елена Street MD   traZODone (DESYREL) 50 mg tablet TAKE 1 TABLET BY MOUTH EVERY DAY AT BEDTIME AS NEEDED FOR SLEEP 9/7/21   Provider, Historical   glucose blood VI test strips (OneTouch Verio test strips) strip Use to check glucose once a day 10/20/21   Елена Street MD   lancets (One Touch Delica) 33 gauge misc USE 1  TO CHECK GLUCOSE TWICE DAILY  DX: E11.9 NPI: [id number] 10/20/21   Елена Street MD   diclofenac (VOLTAREN) 1 % gel APPLY 2 GRAMS TOPICALLY TO AFFECTED AREA 4 TIMES DAILY 9/7/21   MADIHA Childs methIMAzole (TAPAZOLE) 5 mg tablet Take half tablet every day, 90 days 7/21/21   Alton Penn MD   allopurinoL (ZYLOPRIM) 100 mg tablet Take 2 tablets by mouth once daily 6/6/21   Cici Interiano MD   FLUoxetine (PROzac) 20 mg capsule  1/20/21   Provider, Historical   cyanocobalamin (Vitamin B-12) 1,000 mcg tablet Take 1,000 mcg by mouth daily. Provider, Historical   omeprazole (PRILOSEC) 40 mg capsule Take 1 capsule by mouth once daily in the morning 1/18/21   Cici Interiano MD   Colcrys 0.6 mg tablet Take 1 tablet by mouth once daily 1/10/21   Cici Interiano MD   sodium bicarbonate 650 mg tablet TAKE 2 TABLETS BY MOUTH TWICE DAILY 11/13/20   Provider, Historical   albuterol (Ventolin HFA) 90 mcg/actuation inhaler Take 2 Puffs by inhalation every four (4) hours as needed for Wheezing. 10/22/20   Cici Interiano MD   amLODIPine (NORVASC) 5 mg tablet Take 1 Tab by mouth daily. 10/22/20   Cici Interiano MD   cholecalciferol, vitamin D3, (Vitamin D3) 50 mcg (2,000 unit) tab Take  by mouth. Provider, Historical   OneTouch Verio Flex meter misc USE TO CHECK BLOOD SUGAR TWICE A DAY    Provider, Historical   metoprolol succinate (TOPROL-XL) 25 mg XL tablet TAKE 1 2 (ONE HALF) TABLET BY MOUTH ONCE DAILY 9/26/19   Provider, Historical   turmeric root extract 500 mg cap Take 500 mg by mouth daily. Provider, Historical   vitamin E (AQUA GEMS) 400 unit capsule Take 400 Units by mouth daily. Provider, Historical   multivit-minerals/folic acid (ADULT MULTIVITAMIN GUMMIES PO) Take 2 Tabs by mouth daily. Provider, Historical   losartan (COZAAR) 100 mg tablet Take 100 mg by mouth daily. Provider, Historical   aspirin delayed-release 81 mg tablet Take 81 mg by mouth daily. Provider, Historical   atorvastatin (LIPITOR) 40 mg tablet Take 40 mg by mouth daily.     Provider, Historical        Allergies   Allergen Reactions    Iodinated Contrast Media Anaphylaxis    Codeine Itching Objective:     Visit Vitals  /78 (BP 1 Location: Left upper arm, BP Patient Position: Sitting, BP Cuff Size: Adult)   Pulse 74   Temp 98.3 °F (36.8 °C) (Temporal)   Resp 18   Ht 5' 4.5\" (1.638 m)   Wt 177 lb (80.3 kg)   SpO2 98%   BMI 29.91 kg/m²        Physical Exam  Vitals reviewed. Constitutional:       General: She is awake. She is not in acute distress. Appearance: Normal appearance. She is well-groomed and normal weight. HENT:      Head: Normocephalic and atraumatic. Jaw: There is normal jaw occlusion. No trismus, tenderness or malocclusion. Salivary Glands: Right salivary gland is not diffusely enlarged or tender. Left salivary gland is diffusely enlarged and tender (mild). Right Ear: Hearing, tympanic membrane, ear canal and external ear normal.      Left Ear: Hearing, tympanic membrane, ear canal and external ear normal.      Ears:      Toscano exam findings: does not lateralize. Right Rinne: AC > BC. Left Rinne: AC > BC. Nose: No nasal deformity, septal deviation or mucosal edema. Right Nostril: No epistaxis. Left Nostril: No epistaxis. Right Turbinates: Not enlarged, swollen or pale. Left Turbinates: Not enlarged, swollen or pale. Right Sinus: No maxillary sinus tenderness or frontal sinus tenderness. Left Sinus: No maxillary sinus tenderness or frontal sinus tenderness. Mouth/Throat:      Lips: Pink. No lesions. Mouth: Mucous membranes are moist. No oral lesions. Dentition: Has dentures. No dental caries. Tongue: No lesions. Palate: No mass and lesions. Pharynx: Oropharynx is clear. Uvula midline. No oropharyngeal exudate or posterior oropharyngeal erythema. Tonsils: No tonsillar exudate. 0 on the right. 0 on the left. Comments: I can express saliva from left duct today  Eyes:      General: Vision grossly intact. Extraocular Movements: Extraocular movements intact.       Right eye: No nystagmus. Left eye: No nystagmus. Conjunctiva/sclera: Conjunctivae normal.      Pupils: Pupils are equal, round, and reactive to light. Neck:      Thyroid: Thyromegaly present. No thyroid mass or thyroid tenderness. Trachea: Trachea and phonation normal. No tracheal tenderness or tracheal deviation. Cardiovascular:      Rate and Rhythm: Normal rate and regular rhythm. Pulmonary:      Effort: Pulmonary effort is normal. No respiratory distress. Breath sounds: No stridor. Musculoskeletal:         General: No swelling or tenderness. Normal range of motion. Cervical back: No edema or erythema. Lymphadenopathy:      Cervical: No cervical adenopathy. Skin:     General: Skin is warm and dry. Findings: No lesion or rash. Neurological:      General: No focal deficit present. Mental Status: She is alert and oriented to person, place, and time. Mental status is at baseline. Cranial Nerves: Cranial nerves are intact. Coordination: Romberg sign negative. Gait: Gait is intact. Psychiatric:         Mood and Affect: Mood normal.         Behavior: Behavior normal. Behavior is cooperative. Assessment/Plan:     Encounter Diagnoses   Name Primary?  Sialoadenitis of submandibular gland Yes    Multinodular goiter     Xerostomia      Pt with left submandibular stones, reviewed her US. Also MNG. I reviewed CT images personally with patient. Stones in left Submax gland with enlargement    Risks/benefits discussed including any nerve weakness    We will try to arrange preop medical clearance. Salbador Espana MD, [address] [facility]    [address]  [facility]    [address]  [place], [place] [address] [facility] [phone]

## 2022-01-25 NOTE — Clinical Note
Can we arrange preop medical clearance for this patient with PCP in time for her surgery date thanks

## 2022-01-25 NOTE — PROGRESS NOTES
Subjective:    Hema Durbin   68 y.o.   1945     Followup Visit    Location - left neck    Quality - neck swelling    Severity -  moderate    Duration - years    Timing - intermittent    Context - pt with DM2, thyroid nodules, was sent for US thyroid found to have MNG but also sialolithiasis/adenitis left submandibular; she does c/o swelling and pain intermittently left submandibular also, some discomfort in throat when swallowing    Modifying Features - none    Associated symptoms/signs - dry mouth    1/25/22 - fu today preop for left submax gland excision; pt still c/o aching left neck in the area of gland; also dry mouth    Review of Systems  Review of Systems   Constitutional: Negative for chills and fever. HENT: Positive for sore throat. Negative for ear pain, hearing loss, nosebleeds and tinnitus. Eyes: Negative for blurred vision and double vision. Respiratory: Negative for cough, sputum production and shortness of breath. Cardiovascular: Negative for chest pain and palpitations. Gastrointestinal: Negative for heartburn, nausea and vomiting. Musculoskeletal: Positive for neck pain. Negative for joint pain. Skin: Negative. Neurological: Negative for dizziness, speech change, weakness and headaches. Endo/Heme/Allergies: Negative for environmental allergies. Does not bruise/bleed easily. Psychiatric/Behavioral: Negative for memory loss. The patient does not have insomnia.           Past Medical History:   Diagnosis Date    Adjustment disorder with depressed mood 4/10/2017    Adrenal adenoma 4/10/2017    Anxiety disorder 4/10/2017    Arthritis     Atherosclerosis     Backache 7/31/2020    Benign essential hypertension 7/31/2020    Breast cancer ([facility] [address]     Carcinoma in situ of breast 7/31/2020    Cataract 4/10/2017    Chronic kidney disease, stage 3 (moderate) 9/5/2019    Coronary arteriosclerosis 4/10/2017    Dehydration 7/31/2020    Depressive disorder 4/10/2017    Dyslipidemia 4/10/2017    Dyspnea 7/31/2020    Endocrine disorder     Gastroesophageal reflux disease 4/10/2017    Gout     Heart disease     Heartburn     Hirsutism 4/10/2017    History of chemotherapy     History of malignant neoplasm of breast 7/31/2020    HTN (hypertension)     Hyperkalemia 4/10/2017    Hyperlipidemia     Hypertensive disorder 7/31/2020    Hypertensive renal disease 7/31/2020    Hyperthyroidism     Insomnia 4/10/2017    Kidney disease     Lesion of skin of face 4/10/2017    Menopause     Mixed hyperlipidemia 9/5/2019    Mixed hyperlipidemia due to type 2 diabetes mellitus ([address] 7/31/2020    Radiation therapy complication     Stone of salivary gland 12/2021    Subcutaneous mass of neck 3/25/2019    Thyroid disease     Thyroid nodule 9/5/2019    Tobacco user 4/10/2017    Type 2 diabetes mellitus with hyperglycemia ([address] 9/5/2019    Varicose vein of leg     Vitamin D deficiency 4/10/2017     Past Surgical History:   Procedure Laterality Date    HX BREAST BIOPSY  08/18/2011    BIOPSY/REMOVAL LYMPH NODES    HX CATARACT REMOVAL  11/22/2016    HX MASTECTOMY      HX OTHER SURGICAL  01/01/2004    Neck surgery    HX PARTIAL HYSTERECTOMY  01/01/1980    HX TONSILLECTOMY  01/01/1950    IR FINE NEEDLE ASPIRATION THYROID  12/21/2020    IR FINE NEEDLE ASPIRATION THYROID  12/21/2020    OK BREAST SURGERY PROCEDURE UNLISTED Right 01/20/2014    Drainage of fluid    OK BREAST SURGERY PROCEDURE UNLISTED  04/03/2013    BIOPSY BREAST PERCUT W/IMAGE    OK BREAST SURGERY PROCEDURE UNLISTED  07/26/2012    PLACE BREAST CLIP PERCUT    OK BREAST SURGERY PROCEDURE UNLISTED  07/26/2012    BIOPSY BREAST PERCUT W/DEVICE    OK BREAST SURGERY PROCEDURE UNLISTED  08/18/2011    BIOPSY BREAST PERCUT W/IMAGE    OK BREAST SURGERY PROCEDURE UNLISTED  08/11/2011    PLACE BREAST CLIP PERCUT    OK BREAST SURGERY PROCEDURE UNLISTED  08/11/2011    BIOPSY BREAST PERCUT W/DEVICE      Family History Problem Relation Age of Onset    Gall Bladder Disease Mother     Diabetes Sister     Headache Sister     Hypertension Sister     Lung Disease Sister    Raina Stephane Migraines Sister     Alcohol abuse Brother     Cancer Brother     Hypertension Brother     Alcohol abuse Maternal Aunt     Cancer Maternal Aunt     Diabetes Maternal Aunt     Breast Cancer Maternal Aunt     Alcohol abuse Maternal Uncle     Alcohol abuse Paternal Uncle     Diabetes Maternal Grandmother     Heart Disease Maternal Grandmother     Hypertension Maternal Grandmother     Asthma Child     Prostate Cancer Father     Breast Cancer Maternal Aunt      Social History     Tobacco Use    Smoking status: Current Every Day Smoker     Packs/day: 0.50     Years: 60.00     Pack years: 30.00    Smokeless tobacco: Never Used    Tobacco comment: 1/2 pack per day. Substance Use Topics    Alcohol use: No      Prior to Admission medications    Medication Sig Start Date End Date Taking? Authorizing Provider   methIMAzole (TAPAZOLE) 5 mg tablet Take 0.5 tablet everyday, 90 days 1/19/22   Елена Street MD   fenofibrate nanocrystallized (TRICOR) 145 mg tablet Take 1 Tablet by mouth daily for 90 days. 1/19/22 4/19/22  Елена Street MD   atorvastatin (LIPITOR) 40 mg tablet Take 1 Tablet by mouth nightly for 90 days.  1/19/22 4/19/22  Елена Street MD   linaGLIPtin (Tradjenta) 5 mg tablet Take 1 tablet by mouth once daily for 30 days 1/19/22   Елена Street MD   traZODone (DESYREL) 50 mg tablet TAKE 1 TABLET BY MOUTH EVERY DAY AT BEDTIME AS NEEDED FOR SLEEP 9/7/21   Provider, Historical   glucose blood VI test strips (OneTouch Verio test strips) strip Use to check glucose once a day 10/20/21   Елена Street MD   lancets (One Touch Delica) 33 gauge misc USE 1  TO CHECK GLUCOSE TWICE DAILY  DX: E11.9 NPI: [id number] 10/20/21   Елена Street MD   diclofenac (VOLTAREN) 1 % gel APPLY 2 GRAMS TOPICALLY TO AFFECTED AREA 4 TIMES DAILY 9/7/21   MADIHA Childs methIMAzole (TAPAZOLE) 5 mg tablet Take half tablet every day, 90 days 7/21/21   Angela Hernandez MD   allopurinoL (ZYLOPRIM) 100 mg tablet Take 2 tablets by mouth once daily 6/6/21   Sd Aleman MD   FLUoxetine (PROzac) 20 mg capsule  1/20/21   Provider, Historical   cyanocobalamin (Vitamin B-12) 1,000 mcg tablet Take 1,000 mcg by mouth daily. Provider, Historical   omeprazole (PRILOSEC) 40 mg capsule Take 1 capsule by mouth once daily in the morning 1/18/21   Sd Aleman MD   Colcrys 0.6 mg tablet Take 1 tablet by mouth once daily 1/10/21   Sd Aleman MD   sodium bicarbonate 650 mg tablet TAKE 2 TABLETS BY MOUTH TWICE DAILY 11/13/20   Provider, Historical   albuterol (Ventolin HFA) 90 mcg/actuation inhaler Take 2 Puffs by inhalation every four (4) hours as needed for Wheezing. 10/22/20   Sd Aleman MD   amLODIPine (NORVASC) 5 mg tablet Take 1 Tab by mouth daily. 10/22/20   Sd Aleman MD   cholecalciferol, vitamin D3, (Vitamin D3) 50 mcg (2,000 unit) tab Take  by mouth. Provider, Historical   OneTouch Verio Flex meter misc USE TO CHECK BLOOD SUGAR TWICE A DAY    Provider, Historical   metoprolol succinate (TOPROL-XL) 25 mg XL tablet TAKE 1 2 (ONE HALF) TABLET BY MOUTH ONCE DAILY 9/26/19   Provider, Historical   turmeric root extract 500 mg cap Take 500 mg by mouth daily. Provider, Historical   vitamin E (AQUA GEMS) 400 unit capsule Take 400 Units by mouth daily. Provider, Historical   multivit-minerals/folic acid (ADULT MULTIVITAMIN GUMMIES PO) Take 2 Tabs by mouth daily. Provider, Historical   losartan (COZAAR) 100 mg tablet Take 100 mg by mouth daily. Provider, Historical   aspirin delayed-release 81 mg tablet Take 81 mg by mouth daily. Provider, Historical   atorvastatin (LIPITOR) 40 mg tablet Take 40 mg by mouth daily.     Provider, Historical        Allergies   Allergen Reactions    Iodinated Contrast Media Anaphylaxis    Codeine Itching Objective:     Visit Vitals  /78 (BP 1 Location: Left upper arm, BP Patient Position: Sitting, BP Cuff Size: Adult)   Pulse 74   Temp 98.3 °F (36.8 °C) (Temporal)   Resp 18   Ht 5' 4.5\" (1.638 m)   Wt 177 lb (80.3 kg)   SpO2 98%   BMI 29.91 kg/m²        Physical Exam  Vitals reviewed. Constitutional:       General: She is awake. She is not in acute distress. Appearance: Normal appearance. She is well-groomed and normal weight. HENT:      Head: Normocephalic and atraumatic. Jaw: There is normal jaw occlusion. No trismus, tenderness or malocclusion. Salivary Glands: Right salivary gland is not diffusely enlarged or tender. Left salivary gland is diffusely enlarged and tender (mild). Right Ear: Hearing, tympanic membrane, ear canal and external ear normal.      Left Ear: Hearing, tympanic membrane, ear canal and external ear normal.      Ears:      Toscano exam findings: does not lateralize. Right Rinne: AC > BC. Left Rinne: AC > BC. Nose: No nasal deformity, septal deviation or mucosal edema. Right Nostril: No epistaxis. Left Nostril: No epistaxis. Right Turbinates: Not enlarged, swollen or pale. Left Turbinates: Not enlarged, swollen or pale. Right Sinus: No maxillary sinus tenderness or frontal sinus tenderness. Left Sinus: No maxillary sinus tenderness or frontal sinus tenderness. Mouth/Throat:      Lips: Pink. No lesions. Mouth: Mucous membranes are moist. No oral lesions. Dentition: Has dentures. No dental caries. Tongue: No lesions. Palate: No mass and lesions. Pharynx: Oropharynx is clear. Uvula midline. No oropharyngeal exudate or posterior oropharyngeal erythema. Tonsils: No tonsillar exudate. 0 on the right. 0 on the left. Comments: I can express saliva from left duct today  Eyes:      General: Vision grossly intact. Extraocular Movements: Extraocular movements intact.       Right eye: No nystagmus. Left eye: No nystagmus. Conjunctiva/sclera: Conjunctivae normal.      Pupils: Pupils are equal, round, and reactive to light. Neck:      Thyroid: Thyromegaly present. No thyroid mass or thyroid tenderness. Trachea: Trachea and phonation normal. No tracheal tenderness or tracheal deviation. Cardiovascular:      Rate and Rhythm: Normal rate and regular rhythm. Pulmonary:      Effort: Pulmonary effort is normal. No respiratory distress. Breath sounds: No stridor. Musculoskeletal:         General: No swelling or tenderness. Normal range of motion. Cervical back: No edema or erythema. Lymphadenopathy:      Cervical: No cervical adenopathy. Skin:     General: Skin is warm and dry. Findings: No lesion or rash. Neurological:      General: No focal deficit present. Mental Status: She is alert and oriented to person, place, and time. Mental status is at baseline. Cranial Nerves: Cranial nerves are intact. Coordination: Romberg sign negative. Gait: Gait is intact. Psychiatric:         Mood and Affect: Mood normal.         Behavior: Behavior normal. Behavior is cooperative. Assessment/Plan:     Encounter Diagnoses   Name Primary?  Sialoadenitis of submandibular gland Yes    Multinodular goiter     Xerostomia      Pt with left submandibular stones, reviewed her US. Also MNG. I reviewed CT images personally with patient. Stones in left Submax gland with enlargement    Risks/benefits discussed including any nerve weakness    We will try to arrange preop medical clearance. Salbador He MD, [address] [facility]    [address]  [facility]    [address] [address]  [place], [place] [address]

## 2022-02-07 ENCOUNTER — HOSPITAL ENCOUNTER (OUTPATIENT)
Dept: PREADMISSION TESTING | Age: 77
Discharge: HOME | End: 2022-02-07
Payer: MEDICARE

## 2022-02-07 LAB
FLUAV RNA SPEC QL NAA+PROBE: NOT DETECTED
FLUBV RNA SPEC QL NAA+PROBE: NOT DETECTED
SARS-COV-2, COV2: NOT DETECTED

## 2022-02-07 PROCEDURE — 87636 SARSCOV2 & INF A&B AMP PRB: CPT

## 2022-02-10 ENCOUNTER — HOSPITAL ENCOUNTER (OUTPATIENT)
Age: 77
Setting detail: OBSERVATION
Discharge: HOME | End: 2022-02-11
Attending: OTOLARYNGOLOGY | Admitting: OTOLARYNGOLOGY
Payer: MEDICARE

## 2022-02-10 ENCOUNTER — ANESTHESIA (OUTPATIENT)
Dept: SURGERY | Age: 77
End: 2022-02-10
Payer: MEDICARE

## 2022-02-10 ENCOUNTER — ANESTHESIA EVENT (OUTPATIENT)
Dept: SURGERY | Age: 77
End: 2022-02-10
Payer: MEDICARE

## 2022-02-10 DIAGNOSIS — K11.20 SIALOADENITIS OF SUBMANDIBULAR GLAND: Primary | ICD-10-CM

## 2022-02-10 PROBLEM — Z98.890 STATUS POST SURGERY: Status: ACTIVE | Noted: 2022-02-10

## 2022-02-10 LAB
ANION GAP SERPL CALC-SCNC: 3 MMOL/L (ref 5–15)
BUN SERPL-MCNC: 27 MG/DL (ref 6–20)
BUN/CREAT SERPL: 18 (ref 12–20)
CA-I BLD-MCNC: 8.6 MG/DL (ref 8.5–10.1)
CHLORIDE SERPL-SCNC: 114 MMOL/L (ref 97–108)
CO2 SERPL-SCNC: 22 MMOL/L (ref 21–32)
CREAT SERPL-MCNC: 1.52 MG/DL (ref 0.55–1.02)
ERYTHROCYTE [DISTWIDTH] IN BLOOD BY AUTOMATED COUNT: 14.6 % (ref 11.5–14.5)
GLUCOSE BLD STRIP.AUTO-MCNC: 122 MG/DL (ref 65–117)
GLUCOSE BLD STRIP.AUTO-MCNC: 155 MG/DL (ref 65–117)
GLUCOSE BLD STRIP.AUTO-MCNC: 168 MG/DL (ref 65–117)
GLUCOSE SERPL-MCNC: 124 MG/DL (ref 65–100)
HCT VFR BLD AUTO: 41.5 % (ref 35–47)
HGB BLD-MCNC: 13.7 G/DL (ref 11.5–16)
MCH RBC QN AUTO: 29.9 PG (ref 26–34)
MCHC RBC AUTO-ENTMCNC: 33 G/DL (ref 30–36.5)
MCV RBC AUTO: 90.6 FL (ref 80–99)
NRBC # BLD: 0 K/UL (ref 0–0.01)
NRBC BLD-RTO: 0 PER 100 WBC
PERFORMED BY, TECHID: ABNORMAL
PLATELET # BLD AUTO: 271 K/UL (ref 150–400)
PMV BLD AUTO: 11.4 FL (ref 8.9–12.9)
POTASSIUM SERPL-SCNC: 4.8 MMOL/L (ref 3.5–5.1)
RBC # BLD AUTO: 4.58 M/UL (ref 3.8–5.2)
SODIUM SERPL-SCNC: 139 MMOL/L (ref 136–145)
WBC # BLD AUTO: 7.8 K/UL (ref 3.6–11)

## 2022-02-10 PROCEDURE — 77030002933 HC SUT MCRYL J&J -A: Performed by: OTOLARYNGOLOGY

## 2022-02-10 PROCEDURE — 76210000004 HC OR PH I REC 4.5 TO 5 HR: Performed by: OTOLARYNGOLOGY

## 2022-02-10 PROCEDURE — 2709999900 HC NON-CHARGEABLE SUPPLY: Performed by: OTOLARYNGOLOGY

## 2022-02-10 PROCEDURE — 93010 ELECTROCARDIOGRAM REPORT: CPT | Performed by: OTOLARYNGOLOGY

## 2022-02-10 PROCEDURE — 88307 TISSUE EXAM BY PATHOLOGIST: CPT

## 2022-02-10 PROCEDURE — 74011000250 HC RX REV CODE- 250: Performed by: NURSE ANESTHETIST, CERTIFIED REGISTERED

## 2022-02-10 PROCEDURE — 76060000033 HC ANESTHESIA 1 TO 1.5 HR: Performed by: OTOLARYNGOLOGY

## 2022-02-10 PROCEDURE — 74011250636 HC RX REV CODE- 250/636: Performed by: NURSE ANESTHETIST, CERTIFIED REGISTERED

## 2022-02-10 PROCEDURE — 76010000149 HC OR TIME 1 TO 1.5 HR: Performed by: OTOLARYNGOLOGY

## 2022-02-10 PROCEDURE — 74011250636 HC RX REV CODE- 250/636: Performed by: ANESTHESIOLOGY

## 2022-02-10 PROCEDURE — 77030040361 HC SLV COMPR DVT MDII -B: Performed by: OTOLARYNGOLOGY

## 2022-02-10 PROCEDURE — 80048 BASIC METABOLIC PNL TOTAL CA: CPT

## 2022-02-10 PROCEDURE — G0378 HOSPITAL OBSERVATION PER HR: HCPCS

## 2022-02-10 PROCEDURE — 74011250637 HC RX REV CODE- 250/637: Performed by: OTOLARYNGOLOGY

## 2022-02-10 PROCEDURE — 77030014006 HC SPNG HEMSTAT J&J -A: Performed by: OTOLARYNGOLOGY

## 2022-02-10 PROCEDURE — 74011250636 HC RX REV CODE- 250/636: Performed by: OTOLARYNGOLOGY

## 2022-02-10 PROCEDURE — 77030036834 HC APPL HELI-FX W ENDOANCHR CASS MEDT -J: Performed by: OTOLARYNGOLOGY

## 2022-02-10 PROCEDURE — 74011000250 HC RX REV CODE- 250: Performed by: OTOLARYNGOLOGY

## 2022-02-10 PROCEDURE — 77030031139 HC SUT VCRL2 J&J -A: Performed by: OTOLARYNGOLOGY

## 2022-02-10 PROCEDURE — 82962 GLUCOSE BLOOD TEST: CPT

## 2022-02-10 PROCEDURE — 74011250637 HC RX REV CODE- 250/637: Performed by: ANESTHESIOLOGY

## 2022-02-10 PROCEDURE — 74011000250 HC RX REV CODE- 250

## 2022-02-10 PROCEDURE — 77030010507 HC ADH SKN DERMBND J&J -B: Performed by: OTOLARYNGOLOGY

## 2022-02-10 PROCEDURE — 85027 COMPLETE CBC AUTOMATED: CPT

## 2022-02-10 PROCEDURE — 77030003028 HC SUT VCRL J&J -A: Performed by: OTOLARYNGOLOGY

## 2022-02-10 PROCEDURE — 93005 ELECTROCARDIOGRAM TRACING: CPT

## 2022-02-10 PROCEDURE — 42440 EXCISE SUBMAXILLARY GLAND: CPT | Performed by: OTOLARYNGOLOGY

## 2022-02-10 PROCEDURE — 36415 COLL VENOUS BLD VENIPUNCTURE: CPT

## 2022-02-10 RX ORDER — PROPOFOL 10 MG/ML
INJECTION, EMULSION INTRAVENOUS AS NEEDED
Status: DISCONTINUED | OUTPATIENT
Start: 2022-02-10 | End: 2022-02-10 | Stop reason: HOSPADM

## 2022-02-10 RX ORDER — FENTANYL CITRATE 50 UG/ML
50 INJECTION, SOLUTION INTRAMUSCULAR; INTRAVENOUS
Status: DISCONTINUED | OUTPATIENT
Start: 2022-02-10 | End: 2022-02-10 | Stop reason: HOSPADM

## 2022-02-10 RX ORDER — LIDOCAINE HYDROCHLORIDE 10 MG/ML
0.1 INJECTION, SOLUTION EPIDURAL; INFILTRATION; INTRACAUDAL; PERINEURAL AS NEEDED
Status: DISCONTINUED | OUTPATIENT
Start: 2022-02-10 | End: 2022-02-10 | Stop reason: HOSPADM

## 2022-02-10 RX ORDER — LIDOCAINE HYDROCHLORIDE 20 MG/ML
INJECTION, SOLUTION EPIDURAL; INFILTRATION; INTRACAUDAL; PERINEURAL AS NEEDED
Status: DISCONTINUED | OUTPATIENT
Start: 2022-02-10 | End: 2022-02-10 | Stop reason: HOSPADM

## 2022-02-10 RX ORDER — CEFAZOLIN SODIUM 1 G/3ML
INJECTION, POWDER, FOR SOLUTION INTRAMUSCULAR; INTRAVENOUS AS NEEDED
Status: DISCONTINUED | OUTPATIENT
Start: 2022-02-10 | End: 2022-02-10 | Stop reason: HOSPADM

## 2022-02-10 RX ORDER — FENTANYL CITRATE 50 UG/ML
INJECTION, SOLUTION INTRAMUSCULAR; INTRAVENOUS AS NEEDED
Status: DISCONTINUED | OUTPATIENT
Start: 2022-02-10 | End: 2022-02-10 | Stop reason: HOSPADM

## 2022-02-10 RX ORDER — EPHEDRINE SULFATE/0.9% NACL/PF 50 MG/5 ML
5 SYRINGE (ML) INTRAVENOUS AS NEEDED
Status: DISCONTINUED | OUTPATIENT
Start: 2022-02-10 | End: 2022-02-10 | Stop reason: HOSPADM

## 2022-02-10 RX ORDER — SODIUM CHLORIDE 0.9 % (FLUSH) 0.9 %
5-40 SYRINGE (ML) INJECTION EVERY 8 HOURS
Status: DISCONTINUED | OUTPATIENT
Start: 2022-02-10 | End: 2022-02-10 | Stop reason: HOSPADM

## 2022-02-10 RX ORDER — LABETALOL HCL 20 MG/4 ML
5 SYRINGE (ML) INTRAVENOUS
Status: DISCONTINUED | OUTPATIENT
Start: 2022-02-10 | End: 2022-02-10 | Stop reason: HOSPADM

## 2022-02-10 RX ORDER — MIDAZOLAM HYDROCHLORIDE 1 MG/ML
0.5 INJECTION, SOLUTION INTRAMUSCULAR; INTRAVENOUS
Status: DISCONTINUED | OUTPATIENT
Start: 2022-02-10 | End: 2022-02-10 | Stop reason: HOSPADM

## 2022-02-10 RX ORDER — NALOXONE HYDROCHLORIDE 0.4 MG/ML
0.4 INJECTION, SOLUTION INTRAMUSCULAR; INTRAVENOUS; SUBCUTANEOUS AS NEEDED
Status: DISCONTINUED | OUTPATIENT
Start: 2022-02-10 | End: 2022-02-11 | Stop reason: HOSPADM

## 2022-02-10 RX ORDER — LIDOCAINE HYDROCHLORIDE AND EPINEPHRINE 10; 10 MG/ML; UG/ML
INJECTION, SOLUTION INFILTRATION; PERINEURAL AS NEEDED
Status: DISCONTINUED | OUTPATIENT
Start: 2022-02-10 | End: 2022-02-10 | Stop reason: HOSPADM

## 2022-02-10 RX ORDER — SODIUM CHLORIDE 9 MG/ML
25 INJECTION, SOLUTION INTRAVENOUS CONTINUOUS
Status: DISCONTINUED | OUTPATIENT
Start: 2022-02-10 | End: 2022-02-10 | Stop reason: HOSPADM

## 2022-02-10 RX ORDER — DEXAMETHASONE SODIUM PHOSPHATE 4 MG/ML
INJECTION, SOLUTION INTRA-ARTICULAR; INTRALESIONAL; INTRAMUSCULAR; INTRAVENOUS; SOFT TISSUE AS NEEDED
Status: DISCONTINUED | OUTPATIENT
Start: 2022-02-10 | End: 2022-02-10 | Stop reason: HOSPADM

## 2022-02-10 RX ORDER — ACETAMINOPHEN 325 MG/1
650 TABLET ORAL
Status: CANCELLED | OUTPATIENT
Start: 2022-02-10

## 2022-02-10 RX ORDER — ACETAMINOPHEN 325 MG/1
650 TABLET ORAL
Status: DISCONTINUED | OUTPATIENT
Start: 2022-02-10 | End: 2022-02-11 | Stop reason: HOSPADM

## 2022-02-10 RX ORDER — GLYCOPYRROLATE 0.2 MG/ML
INJECTION INTRAMUSCULAR; INTRAVENOUS
Status: COMPLETED
Start: 2022-02-10 | End: 2022-02-10

## 2022-02-10 RX ORDER — HYDROCODONE BITARTRATE AND ACETAMINOPHEN 5; 325 MG/1; MG/1
1 TABLET ORAL
Qty: 20 TABLET | Refills: 0 | Status: SHIPPED | OUTPATIENT
Start: 2022-02-10 | End: 2022-02-13

## 2022-02-10 RX ORDER — SODIUM CHLORIDE 0.9 % (FLUSH) 0.9 %
5-40 SYRINGE (ML) INJECTION AS NEEDED
Status: DISCONTINUED | OUTPATIENT
Start: 2022-02-10 | End: 2022-02-10 | Stop reason: HOSPADM

## 2022-02-10 RX ORDER — DEXMEDETOMIDINE HYDROCHLORIDE 100 UG/ML
INJECTION, SOLUTION INTRAVENOUS AS NEEDED
Status: DISCONTINUED | OUTPATIENT
Start: 2022-02-10 | End: 2022-02-10 | Stop reason: HOSPADM

## 2022-02-10 RX ORDER — MIDAZOLAM HYDROCHLORIDE 1 MG/ML
1 INJECTION, SOLUTION INTRAMUSCULAR; INTRAVENOUS AS NEEDED
Status: DISCONTINUED | OUTPATIENT
Start: 2022-02-10 | End: 2022-02-10 | Stop reason: HOSPADM

## 2022-02-10 RX ORDER — OXYCODONE HYDROCHLORIDE 5 MG/1
10 TABLET ORAL
Status: DISCONTINUED | OUTPATIENT
Start: 2022-02-10 | End: 2022-02-11 | Stop reason: HOSPADM

## 2022-02-10 RX ORDER — SUCCINYLCHOLINE CHLORIDE 20 MG/ML
INJECTION INTRAMUSCULAR; INTRAVENOUS AS NEEDED
Status: DISCONTINUED | OUTPATIENT
Start: 2022-02-10 | End: 2022-02-10 | Stop reason: HOSPADM

## 2022-02-10 RX ORDER — NALOXONE HYDROCHLORIDE 0.4 MG/ML
0.4 INJECTION, SOLUTION INTRAMUSCULAR; INTRAVENOUS; SUBCUTANEOUS AS NEEDED
Status: CANCELLED | OUTPATIENT
Start: 2022-02-10

## 2022-02-10 RX ORDER — OXYCODONE HYDROCHLORIDE 5 MG/1
5 TABLET ORAL
Status: DISCONTINUED | OUTPATIENT
Start: 2022-02-10 | End: 2022-02-11 | Stop reason: HOSPADM

## 2022-02-10 RX ORDER — GLYCOPYRROLATE 0.2 MG/ML
0.1 INJECTION INTRAMUSCULAR; INTRAVENOUS ONCE
Status: ACTIVE | OUTPATIENT
Start: 2022-02-10 | End: 2022-02-10

## 2022-02-10 RX ORDER — DIPHENHYDRAMINE HYDROCHLORIDE 50 MG/ML
12.5 INJECTION, SOLUTION INTRAMUSCULAR; INTRAVENOUS
Status: CANCELLED | OUTPATIENT
Start: 2022-02-10 | End: 2022-02-11

## 2022-02-10 RX ORDER — OXYCODONE HYDROCHLORIDE 5 MG/1
10 TABLET ORAL
Status: CANCELLED | OUTPATIENT
Start: 2022-02-10

## 2022-02-10 RX ORDER — ONDANSETRON 2 MG/ML
4 INJECTION INTRAMUSCULAR; INTRAVENOUS
Status: CANCELLED | OUTPATIENT
Start: 2022-02-10

## 2022-02-10 RX ORDER — ONDANSETRON 2 MG/ML
4 INJECTION INTRAMUSCULAR; INTRAVENOUS AS NEEDED
Status: DISCONTINUED | OUTPATIENT
Start: 2022-02-10 | End: 2022-02-10 | Stop reason: HOSPADM

## 2022-02-10 RX ORDER — OXYCODONE HYDROCHLORIDE 5 MG/1
5 TABLET ORAL
Status: CANCELLED | OUTPATIENT
Start: 2022-02-10

## 2022-02-10 RX ORDER — FENTANYL CITRATE 50 UG/ML
50 INJECTION, SOLUTION INTRAMUSCULAR; INTRAVENOUS AS NEEDED
Status: DISCONTINUED | OUTPATIENT
Start: 2022-02-10 | End: 2022-02-10 | Stop reason: HOSPADM

## 2022-02-10 RX ORDER — METOPROLOL TARTRATE 5 MG/5ML
2.5 INJECTION INTRAVENOUS
Status: DISCONTINUED | OUTPATIENT
Start: 2022-02-10 | End: 2022-02-10 | Stop reason: HOSPADM

## 2022-02-10 RX ORDER — MORPHINE SULFATE 10 MG/ML
2 INJECTION, SOLUTION INTRAMUSCULAR; INTRAVENOUS
Status: DISCONTINUED | OUTPATIENT
Start: 2022-02-10 | End: 2022-02-11 | Stop reason: HOSPADM

## 2022-02-10 RX ORDER — SODIUM CHLORIDE, SODIUM LACTATE, POTASSIUM CHLORIDE, CALCIUM CHLORIDE 600; 310; 30; 20 MG/100ML; MG/100ML; MG/100ML; MG/100ML
90 INJECTION, SOLUTION INTRAVENOUS CONTINUOUS
Status: CANCELLED | OUTPATIENT
Start: 2022-02-10

## 2022-02-10 RX ORDER — SODIUM CHLORIDE 0.9 % (FLUSH) 0.9 %
5-40 SYRINGE (ML) INJECTION EVERY 8 HOURS
Status: CANCELLED | OUTPATIENT
Start: 2022-02-10

## 2022-02-10 RX ORDER — HYDROMORPHONE HYDROCHLORIDE 1 MG/ML
0.4 INJECTION, SOLUTION INTRAMUSCULAR; INTRAVENOUS; SUBCUTANEOUS
Status: DISCONTINUED | OUTPATIENT
Start: 2022-02-10 | End: 2022-02-10 | Stop reason: HOSPADM

## 2022-02-10 RX ORDER — SODIUM CHLORIDE 9 MG/ML
INJECTION, SOLUTION INTRAVENOUS
Status: DISCONTINUED | OUTPATIENT
Start: 2022-02-10 | End: 2022-02-10 | Stop reason: HOSPADM

## 2022-02-10 RX ORDER — EPHEDRINE SULFATE/0.9% NACL/PF 50 MG/5 ML
SYRINGE (ML) INTRAVENOUS AS NEEDED
Status: DISCONTINUED | OUTPATIENT
Start: 2022-02-10 | End: 2022-02-10 | Stop reason: HOSPADM

## 2022-02-10 RX ORDER — MIDAZOLAM HYDROCHLORIDE 1 MG/ML
INJECTION, SOLUTION INTRAMUSCULAR; INTRAVENOUS AS NEEDED
Status: DISCONTINUED | OUTPATIENT
Start: 2022-02-10 | End: 2022-02-10 | Stop reason: HOSPADM

## 2022-02-10 RX ORDER — ONDANSETRON 2 MG/ML
4 INJECTION INTRAMUSCULAR; INTRAVENOUS
Status: DISCONTINUED | OUTPATIENT
Start: 2022-02-10 | End: 2022-02-11 | Stop reason: HOSPADM

## 2022-02-10 RX ORDER — HYDRALAZINE HYDROCHLORIDE 20 MG/ML
10 INJECTION INTRAMUSCULAR; INTRAVENOUS
Status: DISCONTINUED | OUTPATIENT
Start: 2022-02-10 | End: 2022-02-10 | Stop reason: HOSPADM

## 2022-02-10 RX ORDER — MORPHINE SULFATE 2 MG/ML
2 INJECTION, SOLUTION INTRAMUSCULAR; INTRAVENOUS
Status: CANCELLED | OUTPATIENT
Start: 2022-02-10

## 2022-02-10 RX ORDER — DIPHENHYDRAMINE HYDROCHLORIDE 50 MG/ML
12.5 INJECTION, SOLUTION INTRAMUSCULAR; INTRAVENOUS AS NEEDED
Status: DISCONTINUED | OUTPATIENT
Start: 2022-02-10 | End: 2022-02-10 | Stop reason: HOSPADM

## 2022-02-10 RX ORDER — SODIUM CHLORIDE 0.9 % (FLUSH) 0.9 %
5-40 SYRINGE (ML) INJECTION AS NEEDED
Status: CANCELLED | OUTPATIENT
Start: 2022-02-10

## 2022-02-10 RX ORDER — HYDROCODONE BITARTRATE AND ACETAMINOPHEN 5; 325 MG/1; MG/1
1 TABLET ORAL AS NEEDED
Status: DISCONTINUED | OUTPATIENT
Start: 2022-02-10 | End: 2022-02-10 | Stop reason: HOSPADM

## 2022-02-10 RX ORDER — DIPHENHYDRAMINE HYDROCHLORIDE 50 MG/ML
12.5 INJECTION, SOLUTION INTRAMUSCULAR; INTRAVENOUS
Status: ACTIVE | OUTPATIENT
Start: 2022-02-10 | End: 2022-02-11

## 2022-02-10 RX ADMIN — ONDANSETRON 4 MG: 2 INJECTION INTRAMUSCULAR; INTRAVENOUS at 10:19

## 2022-02-10 RX ADMIN — FENTANYL CITRATE 75 MCG: 50 INJECTION, SOLUTION INTRAMUSCULAR; INTRAVENOUS at 08:52

## 2022-02-10 RX ADMIN — Medication 40 MCG/MIN: at 09:03

## 2022-02-10 RX ADMIN — DEXAMETHASONE SODIUM PHOSPHATE 8 MG: 4 INJECTION, SOLUTION INTRA-ARTICULAR; INTRALESIONAL; INTRAMUSCULAR; INTRAVENOUS; SOFT TISSUE at 08:45

## 2022-02-10 RX ADMIN — SODIUM CHLORIDE 25 ML/HR: 9 INJECTION, SOLUTION INTRAVENOUS at 06:50

## 2022-02-10 RX ADMIN — FENTANYL CITRATE 50 MCG: 50 INJECTION INTRAMUSCULAR; INTRAVENOUS at 12:25

## 2022-02-10 RX ADMIN — SODIUM CHLORIDE: 9 INJECTION, SOLUTION INTRAVENOUS at 08:22

## 2022-02-10 RX ADMIN — LIDOCAINE HYDROCHLORIDE 100 MG: 20 INJECTION, SOLUTION EPIDURAL; INFILTRATION; INTRACAUDAL; PERINEURAL at 08:33

## 2022-02-10 RX ADMIN — Medication 10 MG: at 09:01

## 2022-02-10 RX ADMIN — CEFAZOLIN SODIUM 2 G: 1 INJECTION, POWDER, FOR SOLUTION INTRAMUSCULAR; INTRAVENOUS at 08:45

## 2022-02-10 RX ADMIN — GLYCOPYRROLATE 0.2 MG: 0.2 INJECTION INTRAMUSCULAR; INTRAVENOUS at 10:27

## 2022-02-10 RX ADMIN — PROPOFOL 150 MG: 10 INJECTION, EMULSION INTRAVENOUS at 08:33

## 2022-02-10 RX ADMIN — LABETALOL HYDROCHLORIDE 10 MG: 5 INJECTION, SOLUTION INTRAVENOUS at 10:34

## 2022-02-10 RX ADMIN — DEXMEDETOMIDINE HYDROCHLORIDE 4 MCG: 100 INJECTION, SOLUTION INTRAVENOUS at 08:53

## 2022-02-10 RX ADMIN — HYDROCODONE BITARTRATE AND ACETAMINOPHEN 1 TABLET: 5; 325 TABLET ORAL at 14:13

## 2022-02-10 RX ADMIN — MIDAZOLAM HYDROCHLORIDE 1 MG: 2 INJECTION, SOLUTION INTRAMUSCULAR; INTRAVENOUS at 08:27

## 2022-02-10 RX ADMIN — SUCCINYLCHOLINE CHLORIDE 140 MG: 20 INJECTION, SOLUTION INTRAMUSCULAR; INTRAVENOUS at 08:33

## 2022-02-10 RX ADMIN — FENTANYL CITRATE 25 MCG: 50 INJECTION, SOLUTION INTRAMUSCULAR; INTRAVENOUS at 08:33

## 2022-02-10 RX ADMIN — OXYCODONE HYDROCHLORIDE 10 MG: 5 TABLET ORAL at 17:55

## 2022-02-10 NOTE — DISCHARGE INSTRUCTIONS
Patient Education        Submandibular Gland Removal: What to Expect at Home  Your Recovery  Submandibular gland removal is surgery to take out a saliva gland below the lower jaw. The gland may have been removed because of infection, a tumor, or a blocked saliva duct. A saliva duct is a tube that carries saliva from the gland into the mouth. The area below your jaw may be sore for several days after your surgery. The area also may be slightly swollen or bruised. It will probably take 1 to 2 weeks for the cut (incision) to heal.  If you have stitches in your incision, your doctor may need to remove them, or they may dissolve on their own. Ask your doctor about this. If your incision was closed with glue, the glue will peel off on its own in the weeks after your surgery. This care sheet gives you a general idea about how long it will take for you to recover. But each person recovers at a different pace. Follow the steps below to get better as quickly as possible. How can you care for yourself at home? Activity    · Rest when you feel tired. Getting enough sleep will help you recover. For 4 or 5 days after surgery, sleep with your head up by using two or three pillows. You can also try to sleep with your head up in a reclining chair.     · Try to walk each day. Start by walking a little more than you did the day before. Bit by bit, increase the amount you walk. Walking boosts blood flow and helps prevent pneumonia and constipation.     · Avoid strenuous activities, such as bicycle riding, jogging, weight lifting, or aerobic exercise, for 1 week or until your doctor says it is okay.     · For 1 week, avoid lifting anything that would make you strain. This may include a child, heavy grocery bags and milk containers, a heavy briefcase or backpack, cat litter or dog food bags, or a vacuum .     · Ask your doctor when you can drive again.     · You will probably need to take 1 week off from work.  It depends on the type of work you do and how you feel.     · Do not shave the incision for the first 2 weeks or until your doctor says it is okay. It is okay to shave the rest of your neck and face. Diet    · You can eat your normal diet. If your stomach is upset, try bland, low-fat foods like plain rice, broiled chicken, toast, and yogurt.     · Drink plenty of fluids to avoid becoming dehydrated.     · You may notice that your bowel movements are not regular right after your surgery. This is common. Try to avoid constipation and straining with bowel movements. You may want to take a fiber supplement every day. If you have not had a bowel movement after a couple of days, ask your doctor about taking a mild laxative. Medicines    · Your doctor will tell you if and when you can restart your medicines. He or she will also give you instructions about taking any new medicines.     · If you take aspirin or some other blood thinner, ask your doctor if and when to start taking it again. Make sure that you understand exactly what your doctor wants you to do.     · Be safe with medicines. Take pain medicines exactly as directed. ? If the doctor gave you a prescription medicine for pain, take it as prescribed. ? If you are not taking a prescription pain medicine, ask your doctor if you can take an over-the-counter medicine.     · If you think your pain medicine is making you sick to your stomach:  ? Take your medicine after meals (unless your doctor has told you not to). ? Ask your doctor for a different pain medicine.     · If your doctor prescribed antibiotics, take them as directed. Do not stop taking them just because you feel better. You need to take the full course of antibiotics. Incision care    · You may have a bandage over the incision.  Follow your doctor's instructions about how to take care of this bandage and when to take it off.     · If you have strips of tape on the incision, leave the tape on for a week or until it falls off.     · After your doctor says it is okay to get the incision wet, wash the area daily with warm, soapy water, and pat it dry. You may cover the area with a gauze bandage if it weeps or rubs against clothing. Change the bandage every day.     · Your doctor may give you other instructions about how to care for your incision. Follow your doctor's instructions exactly.     · Keep the area clean and dry. Ice    · Put ice or a cold pack on the surgery site for 10 to 20 minutes at a time. Try to do this every 1 to 2 hours for the next 3 days (when you are awake) or until the swelling goes down. Put a thin cloth between the ice and your skin. Follow-up care is a key part of your treatment and safety. Be sure to make and go to all appointments, and call your doctor if you are having problems. It's also a good idea to know your test results and keep a list of the medicines you take. When should you call for help? Call 911 anytime you think you may need emergency care. For example, call if:    · You passed out (lost consciousness).     · You have severe trouble breathing.     · You have sudden chest pain, shortness of breath, or you cough up blood. Call your doctor now or seek immediate medical care if:    · You are sick to your stomach or cannot keep fluids down.     · You have pain that does not get better after you take pain medicine.     · You have loose stitches, or your incision comes open.     · You bleed through your bandage.     · You have signs of infection, such as:  ? Increased pain, swelling, warmth, or redness. ? Red streaks leading from the incision. ? Pus draining from the incision. ? A fever. Watch closely for any changes in your health, and be sure to contact your doctor if:    · You do not get better as expected. Where can you learn more?   Go to http://www.gray.com/  Enter N285 in the search box to learn more about \"Submandibular Gland Removal: What to Expect at Home. \"  Current as of: December 2, 2020               Content Version: 13.0  © 8678-0746 [employer], [employer]. Care instructions adapted under license by [employer] (which disclaims liability or warranty for this information). If you have questions about a medical condition or this instruction, always ask your healthcare professional. [place] [address] any warranty or liability for your use of this information.

## 2022-02-10 NOTE — OP NOTES
Operative Note    Patient: Aric Agustin  YOB: 1945  MRN: [id number]    Date of Procedure: 2/10/2022     Pre-Op Diagnosis: Sialoadenitis and sialolithiasis of left submandibular gland    Post-Op Diagnosis: Same as preoperative diagnosis. Procedure(s):  EXCISION LEFT SUBMANDIBULAR GLAND    Surgeon(s):  Rajan Archibald MD    Surgical Assistant: Surg Asst-1: Vee Smith    Anesthesia: General     Estimated Blood Loss (mL):  Minimal    Complications: None    Specimens:   ID Type Source Tests Collected by Time Destination   1 : left submandibular gland  Preservative Other                  Rajan Archibald MD 2/10/2022 0930 Pathology        Implants: * No implants in log *    Drains: * No LDAs found *    Findings: Fibrosed left submandibular gland with palpable stone at the proximal duct near the hilum. Indications: 60-year-old female presents with recurrent left submandibular pain. She is found to have a left submandibular stone lodged in the proximal duct. Risk benefits of excision of the gland are discussed and patient elects to proceed. Detailed Description of Procedure:     Patient brought to operating room placed supine on the table. General endotracheal anesthesia is obtained and a timeout is performed. She is positioned in the appropriate fashion for left neck surgery with the neck extended and rotated toward the right side. I marked a proposed incision site within a natural skin crease in the left submandibular triangle. I then injected 5 cc of 1% lidocaine with [id number] parts epinephrine into the proposed incision site. The neck was then prepped with chlorhexidine and she was draped in sterile fashion. Incision was made through the skin with a 15 blade carried down through the subcutaneous fat and then dermal bleeders are cauterized. I then incised through the platysma muscle and identified the fascia overlying the submandibular gland.   There was fibrosis between the muscle and the gland fascia. However ultimately I was able to divide and ligate the facial vein and then stay deep to this plane and dissect the platysma off the capsule and retracted the marginal mandibular branch out of the way. I then focused laterally and divided a branch of the facial artery entering the gland and then worked inferiorly bluntly dissecting away from the posterior belly of digastric and then anteriorly away from the anterior belly digastric. Ultimately only the superior attachment remained. We retracted the mylohyoid muscle and identified the lingual nerve and I could palpate the stone in the proximal duct just inferior to the lingual nerve. We carefully dissected the nerve away and then divided the duct cephalad to the stone. At this point the gland and the portion of the duct with the stone was delivered. The wound was copiously irrigated and checked for hemostasis. I packed the defect with a piece of Gelfoam and then we closed the wound in layers with a 3-0 Vicryl for the platysma 4-0 Vicryl for the deep dermis and then a running 4-0 Vicryl Rapide for the skin. The skin was cleaned and dried and a sterile dressing was applied. Specimen was placed in formalin and passed off for pathology. Procedure was now completed. Instrument and sponge counts are correct. Patient is awoken extubated and brought to recovery in satisfactory condition.     Electronically Signed by Errol Dc MD on 2/10/2022 at 9:39 AM

## 2022-02-10 NOTE — PROGRESS NOTES
Reason for Admission:  Inflammation of Submandibular Gland                     RUR Score:    N/A                 Plan for utilizing home health:   None at this time       PCP: First and Last name:  Brooklyn Garnica MD     Name of Practice:    Are you a current patient: Yes/No:    Approximate date of last visit: Last Week   Can you participate in a virtual visit with your PCP:                     Current Advanced Directive/Advance Care Plan: No Order      Healthcare Decision Maker:   Click here to complete [address] including selection of the Healthcare Decision Maker Relationship (ie \"Primary\")           Akua Rees, [phone]                  Transition of Care Plan:                    Patient currently lives at home alone. There are no steps or ramp to enter the home. Patient has a cane at home. She has had HH in the past, but no IRF or SNF services. Patient does no anticipate any needs at discharge. Patient's sister will be her ride home at discharge and patient can transport herself to follow-up appointments. Patient uses the [facility]GARA in [address] Current Dispo: Home/Self.

## 2022-02-10 NOTE — INTERVAL H&P NOTE
Update History & Physical    H&P update    Patient examined at the bedside preoperatively. Heart -regular rate and rhythm, S1/S2  Lungs -clear to auscultation bilaterally    No other changes to prior H&P. Procedure again discussed in detail, risks and benefits explained, postoperative considerations discussed. All questions answered.       Electronically signed by Liana Chavis MD on 2/10/2022 at 8:00 AM

## 2022-02-10 NOTE — PROGRESS NOTES
Medicare Outpatient Observation Notice (MOON)/ [place] Outpatient Observation Notice (Alhaji Moise) provided to patient/representative with verbal explanation of the notice. Time allotted for questions regarding the notice. Patient /representative provided a completed copy of the MOON/VOON notice. Copy placed on bedside chart.

## 2022-02-10 NOTE — ANESTHESIA POSTPROCEDURE EVALUATION
Procedure(s):  EXCISION LEFT SUBMANDIBULAR GLAND.     general    Anesthesia Post Evaluation      Multimodal analgesia: multimodal analgesia used between 6 hours prior to anesthesia start to PACU discharge  Patient location during evaluation: PACU  Patient participation: complete - patient participated  Level of consciousness: awake  Pain score: 0  Pain management: adequate  Airway patency: patent  Anesthetic complications: no  Cardiovascular status: acceptable  Respiratory status: acceptable  Hydration status: acceptable  Post anesthesia nausea and vomiting:  controlled  Final Post Anesthesia Temperature Assessment:  Normothermia (36.0-37.5 degrees C)      INITIAL Post-op Vital signs:   Vitals Value Taken Time   /72 02/10/22 1100   Temp 36.2 °C (97.1 °F) 02/10/22 0952   Pulse 96 02/10/22 1100   Resp 14 02/10/22 1100   SpO2 95 % 02/10/22 1100

## 2022-02-10 NOTE — ANESTHESIA PREPROCEDURE EVALUATION
Relevant Problems   No relevant active problems       Anesthetic History   No history of anesthetic complications            Review of Systems / Medical History  Patient summary reviewed, nursing notes reviewed and pertinent labs reviewed    Pulmonary          Smoker         Neuro/Psych         Psychiatric history     Cardiovascular    Hypertension          CAD, PAD and hyperlipidemia    Exercise tolerance: <4 METS  Comments: MVP    Echo: nl EF. No valve dx.    GI/Hepatic/Renal         Renal disease: CRI       Endo/Other    Diabetes  Hypothyroidism  Morbid obesity, arthritis and cancer     Other Findings            Past Medical History:   Diagnosis Date    Adjustment disorder with depressed mood 4/10/2017    Adrenal adenoma 4/10/2017    Anxiety disorder 4/10/2017    Arthritis     Atherosclerosis     Backache 7/31/2020    Benign essential hypertension 7/31/2020    Breast cancer ([facility] [address]     Carcinoma in situ of breast 7/31/2020    Cataract 4/10/2017    Chronic kidney disease, stage 3 (moderate) 9/5/2019    Coronary arteriosclerosis 4/10/2017    Dehydration 7/31/2020    Depressive disorder 4/10/2017    Dyslipidemia 4/10/2017    Dyspnea 7/31/2020    Endocrine disorder     Gastroesophageal reflux disease 4/10/2017    Gout     Heart disease     Heartburn     Hirsutism 4/10/2017    History of chemotherapy     History of malignant neoplasm of breast 7/31/2020    HTN (hypertension)     Hyperkalemia 4/10/2017    Hyperlipidemia     Hypertensive disorder 7/31/2020    Hypertensive renal disease 7/31/2020    Hyperthyroidism     Insomnia 4/10/2017    Kidney disease     Lesion of skin of face 4/10/2017    Menopause     Mixed hyperlipidemia 9/5/2019    Mixed hyperlipidemia due to type 2 diabetes mellitus ([facility] [address] 7/31/2020    Radiation therapy complication     Stone of salivary gland 12/2021    Subcutaneous mass of neck 3/25/2019    Thyroid disease     Thyroid nodule 9/5/2019    Tobacco user 4/10/2017    Type 2 diabetes mellitus with hyperglycemia ([facility] [address] 9/5/2019    Varicose vein of leg     Vitamin D deficiency 4/10/2017       Past Surgical History:   Procedure Laterality Date    HX BREAST BIOPSY  08/18/2011    BIOPSY/REMOVAL LYMPH NODES    HX CATARACT REMOVAL  11/22/2016    HX MASTECTOMY      HX OTHER SURGICAL  01/01/2004    Neck surgery    HX PARTIAL HYSTERECTOMY  01/01/1980    HX TONSILLECTOMY  01/01/1950    IR FINE NEEDLE ASPIRATION THYROID  12/21/2020    IR FINE NEEDLE ASPIRATION THYROID  12/21/2020    DC BREAST SURGERY PROCEDURE UNLISTED Right 01/20/2014    Drainage of fluid    DC BREAST SURGERY PROCEDURE UNLISTED  04/03/2013    BIOPSY BREAST PERCUT W/IMAGE    DC BREAST SURGERY PROCEDURE UNLISTED  07/26/2012    PLACE BREAST CLIP PERCUT    DC BREAST SURGERY PROCEDURE UNLISTED  07/26/2012    BIOPSY BREAST PERCUT W/DEVICE    DC BREAST SURGERY PROCEDURE UNLISTED  08/18/2011    BIOPSY BREAST PERCUT W/IMAGE    DC BREAST SURGERY PROCEDURE UNLISTED  08/11/2011    PLACE BREAST CLIP PERCUT    DC BREAST SURGERY PROCEDURE UNLISTED  08/11/2011    BIOPSY BREAST PERCUT W/DEVICE       Current Outpatient Medications   Medication Instructions    albuterol (Ventolin HFA) 90 mcg/actuation inhaler 2 Puffs, Inhalation, EVERY 4 HOURS AS NEEDED    allopurinoL (ZYLOPRIM) 100 mg tablet Take 2 tablets by mouth once daily    amLODIPine (NORVASC) 5 mg, Oral, DAILY    aspirin delayed-release 81 mg, Oral, DAILY    atorvastatin (LIPITOR) 40 mg, Oral, DAILY    atorvastatin (LIPITOR) 40 mg, Oral, EVERY BEDTIME    cholecalciferol, vitamin D3, (Vitamin D3) 50 mcg (2,000 unit) tab Oral    Colcrys 0.6 mg tablet Take 1 tablet by mouth once daily    cyanocobalamin (VITAMIN B-12) 1,000 mcg, Oral, DAILY    diclofenac (VOLTAREN) 1 % gel APPLY 2 GRAMS TOPICALLY TO AFFECTED AREA 4 TIMES DAILY    fenofibrate nanocrystallized (TRICOR) 145 mg, Oral, DAILY    FLUoxetine (PROzac) 20 mg capsule No dose, route, or frequency recorded.     glucose blood VI test strips (OneTouch Verio test strips) strip Use to check glucose once a day    lancets (One Touch Delica) 33 gauge misc USE 1  TO CHECK GLUCOSE TWICE DAILY  DX: E11.9 NPI: [id number]    linaGLIPtin (Tradjenta) 5 mg tablet Take 1 tablet by mouth once daily for 30 days    losartan (COZAAR) 100 mg, Oral, DAILY    methIMAzole (TAPAZOLE) 5 mg tablet Take half tablet every day, 90 days    methIMAzole (TAPAZOLE) 5 mg tablet Take 0.5 tablet everyday, 90 days    metoprolol succinate (TOPROL-XL) 25 mg XL tablet TAKE 1 2 (ONE HALF) TABLET BY MOUTH ONCE DAILY    multivit-minerals/folic acid (ADULT MULTIVITAMIN GUMMIES PO) 2 Tablets, Oral, DAILY    omeprazole (PRILOSEC) 40 mg capsule Take 1 capsule by mouth once daily in the morning    OneTouch Verio Flex meter misc USE TO CHECK BLOOD SUGAR TWICE A DAY     sodium bicarbonate 650 mg tablet TAKE 2 TABLETS BY MOUTH TWICE DAILY    traZODone (DESYREL) 50 mg tablet TAKE 1 TABLET BY MOUTH EVERY DAY AT BEDTIME AS NEEDED FOR SLEEP    turmeric root extract 500 mg, DAILY    vitamin E (AQUA GEMS) 400 Units, Oral, DAILY       Current Facility-Administered Medications   Medication Dose Route Frequency    0.9% sodium chloride infusion  25 mL/hr IntraVENous CONTINUOUS    ceFAZolin (ANCEF) 2 g in sterile water (preservative free) 20 mL IV syringe  2 g IntraVENous ONCE       Patient Vitals for the past 24 hrs:   Temp Pulse Resp BP SpO2   02/10/22 0631 36.6 °C (97.9 °F) 76 18 133/80 98 %       Lab Results   Component Value Date/Time    WBC 7.8 02/10/2022 06:47 AM    HGB 13.7 02/10/2022 06:47 AM    HCT 41.5 02/10/2022 06:47 AM    PLATELET 584 62/07/7617 06:47 AM    MCV 90.6 02/10/2022 06:47 AM     Lab Results   Component Value Date/Time    Sodium 143 10/20/2021 10:15 AM    Potassium 4.4 10/20/2021 10:15 AM    Chloride 107 (H) 10/20/2021 10:15 AM    CO2 23 10/20/2021 10:15 AM    Anion gap 8 05/10/2021 11:47 AM    Glucose 161 (H) 10/20/2021 10:15 AM    BUN 22 10/20/2021 10:15 AM    Creatinine 1.59 (H) 10/20/2021 10:15 AM    BUN/Creatinine ratio 14 10/20/2021 10:15 AM    GFR est AA 36 (L) 10/20/2021 10:15 AM    GFR est non-AA 31 (L) 10/20/2021 10:15 AM    Calcium 9.0 10/20/2021 10:15 AM     No results found for: APTT, PTP, INR, INREXT  Lab Results   Component Value Date/Time    Glucose 161 (H) 10/20/2021 10:15 AM    Glucose (POC) 122 (H) 02/10/2022 06:47 AM     Physical Exam    Airway  Mallampati: I  TM Distance: 4 - 6 cm  Neck ROM: normal range of motion   Mouth opening: Normal     Cardiovascular    Rhythm: regular  Rate: normal         Dental    Dentition: Edentulous     Pulmonary  Breath sounds clear to auscultation               Abdominal  GI exam deferred       Other Findings            Anesthetic Plan    ASA: 4  Anesthesia type: general          Induction: Intravenous  Anesthetic plan and risks discussed with: Patient and Family

## 2022-02-10 NOTE — PROGRESS NOTES
1019-zofran given for nausea. 1022 patient complaining of headache, hr dropped to 35 and sbp over 200-see vital flowsheet. No neuro deficit. Dr. Latisha Tubbs to bedside. Blood sugar 155. Robinol and labetolol given as documented on MAR.   12 lead EKG done. Dr. Zari Price to bedside. Patient remains alert and oriented without deficit. Headache began to feel better and BP down. Patient to stay overnight, sister -Imani Abraham notified.

## 2022-02-10 NOTE — PROGRESS NOTES
Saw patient in her room on 5 W. Complaining of minimal headache minimal throat pain otherwise fine. See prior nursing notes for events postanesthesia. Patient is mentating appropriately, speaking in full sentences. Neck dressing in place minimal staining. Mild left lower lip weakness. Plan is for overnight observation secondary to episode of bradycardia and hypertension postop.

## 2022-02-11 VITALS
HEART RATE: 63 BPM | RESPIRATION RATE: 20 BRPM | SYSTOLIC BLOOD PRESSURE: 130 MMHG | OXYGEN SATURATION: 96 % | DIASTOLIC BLOOD PRESSURE: 73 MMHG | TEMPERATURE: 97.8 F

## 2022-02-11 LAB
ATRIAL RATE: 53 BPM
ATRIAL RATE: 72 BPM
CALCULATED P AXIS, ECG09: -5 DEGREES
CALCULATED P AXIS, ECG09: 42 DEGREES
CALCULATED R AXIS, ECG10: 29 DEGREES
CALCULATED R AXIS, ECG10: 44 DEGREES
CALCULATED T AXIS, ECG11: 52 DEGREES
CALCULATED T AXIS, ECG11: 67 DEGREES
DIAGNOSIS, 93000: NORMAL
DIAGNOSIS, 93000: NORMAL
GLUCOSE BLD STRIP.AUTO-MCNC: 132 MG/DL (ref 65–117)
GLUCOSE BLD STRIP.AUTO-MCNC: 150 MG/DL (ref 65–117)
P-R INTERVAL, ECG05: 128 MS
P-R INTERVAL, ECG05: 152 MS
PERFORMED BY, TECHID: ABNORMAL
PERFORMED BY, TECHID: ABNORMAL
Q-T INTERVAL, ECG07: 348 MS
Q-T INTERVAL, ECG07: 454 MS
QRS DURATION, ECG06: 100 MS
QRS DURATION, ECG06: 104 MS
QTC CALCULATION (BEZET), ECG08: 381 MS
QTC CALCULATION (BEZET), ECG08: 426 MS
VENTRICULAR RATE, ECG03: 53 BPM
VENTRICULAR RATE, ECG03: 72 BPM

## 2022-02-11 PROCEDURE — 74011250637 HC RX REV CODE- 250/637: Performed by: OTOLARYNGOLOGY

## 2022-02-11 PROCEDURE — 82962 GLUCOSE BLOOD TEST: CPT

## 2022-02-11 PROCEDURE — G0378 HOSPITAL OBSERVATION PER HR: HCPCS

## 2022-02-11 RX ADMIN — OXYCODONE HYDROCHLORIDE 5 MG: 5 TABLET ORAL at 07:59

## 2022-02-11 NOTE — DISCHARGE SUMMARY
[facility] Progress Note    Patient: Harry Bashir MRN: [id number]  SSN: [id number]    YOB: 1945  Age: 68 y.o. Sex: female      Admit Date: 2/10/2022    LOS: 0 days     Subjective:     Postoperative day 1, patient still complaining of some sore throat when swallowing but otherwise feels fine      Objective:     Vitals:    02/10/22 1400 02/10/22 1442 02/10/22 2014 02/11/22 0807   BP: 126/86 126/83 124/79 130/73   Pulse: 96 91 89 63   Resp: 18 16 17 20   Temp:  97.8 °F (36.6 °C) 98.3 °F (36.8 °C) 97.8 °F (36.6 °C)   SpO2: 97% 93% 95% 96%        Intake and Output:  Current Shift: No intake/output data recorded. Last three shifts: 02/09 1901 - 02/11 0700  In: 400 [I.V.:400]  Out: -     Physical Exam:   No acute distress  Sitting upright, alert  Mild left lower lip weakness  Left submandibular dressing removed, incision intact  New sterile dressing applied  Cranial nerve XII intact    Lab/Data Review:    Recent Results (from the past 24 hour(s))   GLUCOSE, POC    Collection Time: 02/10/22  4:33 PM   Result Value Ref Range    Glucose (POC) 168 (H) 65 - 117 mg/dL    Performed by Ashwini Ortiz    GLUCOSE, POC    Collection Time: 02/11/22  7:35 AM   Result Value Ref Range    Glucose (POC) 132 (H) 65 - 117 mg/dL    Performed by Kevin Banegas Final, POC    Collection Time: 02/11/22 11:17 AM   Result Value Ref Range    Glucose (POC) 150 (H) 65 - 117 mg/dL    Performed by Maury Faria             Assessment:     Active Problems:    Inflammation of submandibular gland (2/10/2022)      Status post surgery (2/10/2022)        Plan:     Postop day 1 excision left submandibular gland  Admitted overnight due to episode of bradycardia and hypertension in the PACU  Did well overnight, okay to discharge home  Okay to get incision wet tomorrow  Follow-up in office as scheduled    Signed By: Miguelangel Allred MD     February 11, 2022         Salbador Hancock, [address] [place] [address] Theresa Bolanos

## 2022-02-11 NOTE — PROGRESS NOTES
Discharge plan of care/case management plan validated with provider discharge order. Patient discharged to home self care. Vitals stable. PIV removed. Patient's RX called into pharmacy. Patient to follow up appts made. Patient's sister to provide transportation.

## 2022-02-11 NOTE — PROGRESS NOTES
1354: Discharge order noted. No CM needs. Discharge plan of care/case management plan validated with provider discharge order.    ______________________________________________________________    CM reviewed chart. Patient has no CM needs at discharge. Patient can discharge whenever medically clear. CM will continue to follow.

## 2022-02-22 ENCOUNTER — OFFICE VISIT (OUTPATIENT)
Dept: ENT CLINIC | Age: 77
End: 2022-02-22
Payer: MEDICARE

## 2022-02-22 VITALS
WEIGHT: 177 LBS | HEART RATE: 94 BPM | TEMPERATURE: 98.2 F | BODY MASS INDEX: 29.49 KG/M2 | OXYGEN SATURATION: 99 % | DIASTOLIC BLOOD PRESSURE: 76 MMHG | RESPIRATION RATE: 17 BRPM | HEIGHT: 65 IN | SYSTOLIC BLOOD PRESSURE: 120 MMHG

## 2022-02-22 DIAGNOSIS — K11.20 SIALOADENITIS OF SUBMANDIBULAR GLAND: Primary | ICD-10-CM

## 2022-02-22 PROCEDURE — 99024 POSTOP FOLLOW-UP VISIT: CPT | Performed by: OTOLARYNGOLOGY

## 2022-02-22 NOTE — PROGRESS NOTES
Subjective:    Roxianne Gottron   68 y.o.   1945     Followup Visit    Location - left neck    Quality - neck swelling    Severity -  moderate    Duration - years    Timing - intermittent    Context - pt with DM2, thyroid nodules, was sent for US thyroid found to have MNG but also sialolithiasis/adenitis left submandibular; she does c/o swelling and pain intermittently left submandibular also, some discomfort in throat when swallowing    Modifying Features - none    Associated symptoms/signs - dry mouth    1/25/22 - fu today preop for left submax gland excision; pt still c/o aching left neck in the area of gland; also dry mouth    2/22/22 - 10 day f/u post left submandibular excision - some pain still but doing well overall         Past Medical History:   Diagnosis Date    Adjustment disorder with depressed mood 4/10/2017    Adrenal adenoma 4/10/2017    Anxiety disorder 4/10/2017    Arthritis     Atherosclerosis     Backache 7/31/2020    Benign essential hypertension 7/31/2020    Breast cancer ([facility] [address]     Carcinoma in situ of breast 7/31/2020    Cataract 4/10/2017    Chronic kidney disease, stage 3 (moderate) 9/5/2019    Coronary arteriosclerosis 4/10/2017    Dehydration 7/31/2020    Depressive disorder 4/10/2017    Dyslipidemia 4/10/2017    Dyspnea 7/31/2020    Endocrine disorder     Gastroesophageal reflux disease 4/10/2017    Gout     Heart disease     Heartburn     Hirsutism 4/10/2017    History of chemotherapy     History of malignant neoplasm of breast 7/31/2020    HTN (hypertension)     Hyperkalemia 4/10/2017    Hyperlipidemia     Hypertensive disorder 7/31/2020    Hypertensive renal disease 7/31/2020    Hyperthyroidism     Insomnia 4/10/2017    Kidney disease     Lesion of skin of face 4/10/2017    Menopause     Mixed hyperlipidemia 9/5/2019    Mixed hyperlipidemia due to type 2 diabetes mellitus ([address] 7/31/2020    Radiation therapy complication     Stone of salivary gland 12/2021    Subcutaneous mass of neck 3/25/2019    Thyroid disease     Thyroid nodule 9/5/2019    Tobacco user 4/10/2017    Type 2 diabetes mellitus with hyperglycemia ([address] 9/5/2019    Varicose vein of leg     Vitamin D deficiency 4/10/2017     Past Surgical History:   Procedure Laterality Date    HX BREAST BIOPSY  08/18/2011    BIOPSY/REMOVAL LYMPH NODES    HX CATARACT REMOVAL  11/22/2016    HX MASTECTOMY      HX OTHER SURGICAL  01/01/2004    Neck surgery    HX PARTIAL HYSTERECTOMY  01/01/1980    HX TONSILLECTOMY  01/01/1950    IR FINE NEEDLE ASPIRATION THYROID  12/21/2020    IR FINE NEEDLE ASPIRATION THYROID  12/21/2020    DC BREAST SURGERY PROCEDURE UNLISTED Right 01/20/2014    Drainage of fluid    DC BREAST SURGERY PROCEDURE UNLISTED  04/03/2013    BIOPSY BREAST PERCUT W/IMAGE    DC BREAST SURGERY PROCEDURE UNLISTED  07/26/2012    PLACE BREAST CLIP PERCUT    DC BREAST SURGERY PROCEDURE UNLISTED  07/26/2012    BIOPSY BREAST PERCUT W/DEVICE    DC BREAST SURGERY PROCEDURE UNLISTED  08/18/2011    BIOPSY BREAST PERCUT W/IMAGE    DC BREAST SURGERY PROCEDURE UNLISTED  08/11/2011    PLACE BREAST CLIP PERCUT    DC BREAST SURGERY PROCEDURE UNLISTED  08/11/2011    BIOPSY BREAST PERCUT W/DEVICE      Family History   Problem Relation Age of Onset    Gall Bladder Disease Mother     Diabetes Sister     Headache Sister     Hypertension Sister     Lung Disease Sister    [facility] Migraines Sister     Alcohol abuse Brother     Cancer Brother     Hypertension Brother     Alcohol abuse Maternal Aunt     Cancer Maternal Aunt     Diabetes Maternal Aunt     Breast Cancer Maternal Aunt     Alcohol abuse Maternal Uncle     Alcohol abuse Paternal Uncle     Diabetes Maternal Grandmother     Heart Disease Maternal Grandmother     Hypertension Maternal Grandmother     Asthma Child     Prostate Cancer Father     Breast Cancer Maternal Aunt      Social History     Tobacco Use    Smoking status: Current Every Day Smoker     Packs/day: 0.50     Years: 60.00     Pack years: 30.00    Smokeless tobacco: Never Used    Tobacco comment: 1/2 pack per day. Substance Use Topics    Alcohol use: No      Prior to Admission medications    Medication Sig Start Date End Date Taking? Authorizing Provider   fenofibrate nanocrystallized (TRICOR) 145 mg tablet Take 1 Tablet by mouth daily for 90 days. 1/19/22 4/19/22  Sakshi Daniels MD   atorvastatin (LIPITOR) 40 mg tablet Take 1 Tablet by mouth nightly for 90 days. 1/19/22 4/19/22  Sakshi Daniels MD   linaGLIPtin (Tradjenta) 5 mg tablet Take 1 tablet by mouth once daily for 30 days 1/19/22   Sakshi Daniels MD   traZODone (DESYREL) 50 mg tablet TAKE 1 TABLET BY MOUTH EVERY DAY AT BEDTIME AS NEEDED FOR SLEEP 9/7/21   Provider, Historical   glucose blood VI test strips (OneTouch Verio test strips) strip Use to check glucose once a day 10/20/21   Sakshi Daniels MD   lancets (One Touch Delica) 33 gauge misc USE 1  TO CHECK GLUCOSE TWICE DAILY  DX: E11.9 NPI: [id number] 10/20/21   Sakshi Daniels MD   diclofenac (VOLTAREN) 1 % gel APPLY 2 GRAMS TOPICALLY TO AFFECTED AREA 4 TIMES DAILY 9/7/21   MADIHA Otto   methIMAzole (TAPAZOLE) 5 mg tablet Take half tablet every day, 90 days 7/21/21   Sakshi Daniels MD   allopurinoL (ZYLOPRIM) 100 mg tablet Take 2 tablets by mouth once daily 6/6/21   Concepcion Fuller MD   FLUoxetine (PROzac) 20 mg capsule  1/20/21   Provider, Historical   cyanocobalamin (Vitamin B-12) 1,000 mcg tablet Take 1,000 mcg by mouth daily.     Provider, Historical   omeprazole (PRILOSEC) 40 mg capsule Take 1 capsule by mouth once daily in the morning 1/18/21   Concepcion Fuller MD   Colcrys 0.6 mg tablet Take 1 tablet by mouth once daily 1/10/21   Concepcion Fuller MD   sodium bicarbonate 650 mg tablet TAKE 2 TABLETS BY MOUTH TWICE DAILY 11/13/20   Provider, Historical   albuterol (Ventolin HFA) 90 mcg/actuation inhaler Take 2 Puffs by inhalation every four (4) hours as needed for Wheezing. 10/22/20   Trinh Brewer MD   amLODIPine (NORVASC) 5 mg tablet Take 1 Tab by mouth daily. 10/22/20   Trinh Brewer MD   cholecalciferol, vitamin D3, (Vitamin D3) 50 mcg (2,000 unit) tab Take  by mouth. Provider, Historical   OneTouch Verio Flex meter misc USE TO CHECK BLOOD SUGAR TWICE A DAY    Provider, Historical   metoprolol succinate (TOPROL-XL) 25 mg XL tablet TAKE 1 2 (ONE HALF) TABLET BY MOUTH ONCE DAILY 9/26/19   Provider, Historical   vitamin E (AQUA GEMS) 400 unit capsule Take 400 Units by mouth daily. Provider, Historical   multivit-minerals/folic acid (ADULT MULTIVITAMIN GUMMIES PO) Take 2 Tabs by mouth daily. Provider, Historical   losartan (COZAAR) 100 mg tablet Take 100 mg by mouth daily. Provider, Historical        Allergies   Allergen Reactions    Iodinated Contrast Media Anaphylaxis    Codeine Itching         Objective:     Visit Vitals  /76 (BP 1 Location: Left upper arm, BP Patient Position: Sitting, BP Cuff Size: Adult)   Pulse 94   Temp 98.2 °F (36.8 °C) (Temporal)   Resp 17   Ht 5' 4.5\" (1.638 m)   Wt 177 lb (80.3 kg)   SpO2 99%   BMI 29.91 kg/m²      NAD, voice clear  Neck incision intact, moderate edema, no erythema  Mild lower lip weakness, stronger than postop  Tongue mobile    Pathology:  Left submandibular gland, resection:          Chronic sialadenitis with fibrosis and dilated salivary gland duct   containing calculus material.       No evidence of neoplasm. Assessment/Plan:     Encounter Diagnoses   Name Primary?  Sialoadenitis of submandibular gland Yes     Doing well postop  Pathology discussed  Wound care discussed  Fu 1mo    Salbador Mattson MD, [address] [facility]    [address]  [facility], [facility]    [address] [address]  [address] [facility]  [phone]. [phone]

## 2022-02-22 NOTE — LETTER
2/22/2022    Patient: Aurora Ramirez   YOB: 1945   Date of Visit: 2/22/2022     Magdiel Dunn MD  [address] [place] [id number]  Via Fax: [phone]    Dear Magdiel Dunn MD,      Thank you for referring Ms. Elizabeth Javed to [facility] for evaluation. My notes for this consultation are attached. If you have questions, please do not hesitate to call me. I look forward to following your patient along with you.       Sincerely,    Bethany Bolaños MD

## 2022-02-22 NOTE — PROGRESS NOTES
Visit Vitals  /76 (BP 1 Location: Left upper arm, BP Patient Position: Sitting, BP Cuff Size: Adult)   Pulse 94   Temp 98.2 °F (36.8 °C) (Temporal)   Resp 17   Ht 5' 4.5\" (1.638 m)   Wt 177 lb (80.3 kg)   SpO2 99%   BMI 29.91 kg/m²     Chief Complaint   Patient presents with    Post OP Follow Up

## 2022-03-18 PROBLEM — Z72.0 TOBACCO USER: Status: ACTIVE | Noted: 2017-04-10

## 2022-03-18 PROBLEM — L98.9 LESION OF SKIN OF FACE: Status: ACTIVE | Noted: 2017-04-10

## 2022-03-18 PROBLEM — M54.9 BACKACHE: Status: ACTIVE | Noted: 2020-07-31

## 2022-03-18 PROBLEM — E78.5 DYSLIPIDEMIA: Status: ACTIVE | Noted: 2017-04-10

## 2022-03-18 PROBLEM — Z98.890 STATUS POST SURGERY: Status: ACTIVE | Noted: 2022-02-10

## 2022-03-18 PROBLEM — D35.00 ADRENAL ADENOMA: Status: ACTIVE | Noted: 2017-04-10

## 2022-03-18 PROBLEM — K11.20: Status: ACTIVE | Noted: 2022-02-10

## 2022-03-18 PROBLEM — Z85.3 HISTORY OF MALIGNANT NEOPLASM OF BREAST: Status: ACTIVE | Noted: 2020-07-31

## 2022-03-18 PROBLEM — R25.1 TREMOR: Status: ACTIVE | Noted: 2020-08-17

## 2022-03-18 PROBLEM — L68.0 HIRSUTISM: Status: ACTIVE | Noted: 2017-04-10

## 2022-03-19 PROBLEM — R06.00 DYSPNEA: Status: ACTIVE | Noted: 2020-07-31

## 2022-03-19 PROBLEM — K21.9 GASTROESOPHAGEAL REFLUX DISEASE: Status: ACTIVE | Noted: 2017-04-10

## 2022-03-19 PROBLEM — F32.A DEPRESSIVE DISORDER: Status: ACTIVE | Noted: 2017-04-10

## 2022-03-19 PROBLEM — F41.9 ANXIETY DISORDER: Status: ACTIVE | Noted: 2017-04-10

## 2022-03-19 PROBLEM — R51.9 HEADACHE DISORDER: Status: ACTIVE | Noted: 2020-08-17

## 2022-03-19 PROBLEM — E66.01 SEVERE OBESITY (HCC): Status: ACTIVE | Noted: 2019-02-14

## 2022-03-19 PROBLEM — I10 HYPERTENSIVE DISORDER: Status: ACTIVE | Noted: 2020-07-31

## 2022-03-19 PROBLEM — E78.2 MIXED HYPERLIPIDEMIA: Status: ACTIVE | Noted: 2019-09-05

## 2022-03-19 PROBLEM — D05.90 CARCINOMA IN SITU OF BREAST: Status: ACTIVE | Noted: 2020-07-31

## 2022-03-19 PROBLEM — E86.0 DEHYDRATION: Status: ACTIVE | Noted: 2020-07-31

## 2022-03-19 PROBLEM — R22.1 SUBCUTANEOUS MASS OF NECK: Status: ACTIVE | Noted: 2019-03-25

## 2022-03-19 PROBLEM — E87.5 HYPERKALEMIA: Status: ACTIVE | Noted: 2017-04-10

## 2022-03-19 PROBLEM — I12.9 HYPERTENSIVE RENAL DISEASE: Status: ACTIVE | Noted: 2020-07-31

## 2022-03-19 PROBLEM — G25.2 COARSE TREMORS: Status: ACTIVE | Noted: 2018-02-08

## 2022-03-19 PROBLEM — M10.9 GOUT: Status: ACTIVE | Noted: 2020-08-17

## 2022-03-19 PROBLEM — H26.9 CATARACT: Status: ACTIVE | Noted: 2017-04-10

## 2022-03-19 PROBLEM — E05.90 SUBCLINICAL HYPERTHYROIDISM: Status: ACTIVE | Noted: 2017-06-22

## 2022-03-19 PROBLEM — G47.00 INSOMNIA: Status: ACTIVE | Noted: 2017-04-10

## 2022-03-19 PROBLEM — K11.20 SIALOADENITIS OF SUBMANDIBULAR GLAND: Status: ACTIVE | Noted: 2020-08-17

## 2022-03-19 PROBLEM — K63.5 POLYP OF COLON: Status: ACTIVE | Noted: 2020-08-17

## 2022-03-19 PROBLEM — E04.1 THYROID NODULE: Status: ACTIVE | Noted: 2019-09-05

## 2022-03-19 PROBLEM — I25.10 CORONARY ARTERIOSCLEROSIS: Status: ACTIVE | Noted: 2017-04-10

## 2022-03-19 PROBLEM — E04.2 MULTINODULAR GOITER: Status: ACTIVE | Noted: 2020-12-02

## 2022-03-20 PROBLEM — E11.69 MIXED HYPERLIPIDEMIA DUE TO TYPE 2 DIABETES MELLITUS (HCC): Status: ACTIVE | Noted: 2020-07-31

## 2022-03-20 PROBLEM — F43.21 ADJUSTMENT DISORDER WITH DEPRESSED MOOD: Status: ACTIVE | Noted: 2017-04-10

## 2022-03-20 PROBLEM — E11.65 TYPE 2 DIABETES MELLITUS WITH HYPERGLYCEMIA (HCC): Status: ACTIVE | Noted: 2019-09-05

## 2022-03-20 PROBLEM — N18.30 STAGE 3 CHRONIC KIDNEY DISEASE (HCC): Status: ACTIVE | Noted: 2019-09-05

## 2022-03-20 PROBLEM — E11.65 TYPE 2 DIABETES MELLITUS WITH HYPERGLYCEMIA, WITHOUT LONG-TERM CURRENT USE OF INSULIN (HCC): Status: ACTIVE | Noted: 2020-12-02

## 2022-03-20 PROBLEM — E55.9 VITAMIN D DEFICIENCY: Status: ACTIVE | Noted: 2017-04-10

## 2022-03-20 PROBLEM — E78.2 MIXED HYPERLIPIDEMIA DUE TO TYPE 2 DIABETES MELLITUS (HCC): Status: ACTIVE | Noted: 2020-07-31

## 2022-03-22 ENCOUNTER — OFFICE VISIT (OUTPATIENT)
Dept: ENT CLINIC | Age: 77
End: 2022-03-22
Payer: MEDICARE

## 2022-03-22 VITALS
WEIGHT: 177 LBS | RESPIRATION RATE: 18 BRPM | BODY MASS INDEX: 29.49 KG/M2 | SYSTOLIC BLOOD PRESSURE: 120 MMHG | HEART RATE: 87 BPM | DIASTOLIC BLOOD PRESSURE: 78 MMHG | TEMPERATURE: 98.3 F | HEIGHT: 65 IN | OXYGEN SATURATION: 96 %

## 2022-03-22 DIAGNOSIS — K11.20 SIALOADENITIS OF SUBMANDIBULAR GLAND: Primary | ICD-10-CM

## 2022-03-22 PROCEDURE — 99024 POSTOP FOLLOW-UP VISIT: CPT | Performed by: OTOLARYNGOLOGY

## 2022-03-22 NOTE — PROGRESS NOTES
Visit Vitals  Blood Pressure 120/78 (BP 1 Location: Left upper arm, BP Patient Position: Sitting, BP Cuff Size: Adult)   Pulse 87   Temperature 98.3 °F (36.8 °C) (Temporal)   Respiration 18   Height 5' 4.5\" (1.638 m)   Weight 177 lb (80.3 kg)   Oxygen Saturation 96%   Body Mass Index 29.91 kg/m²     Chief Complaint   Patient presents with    Follow-up     Sialoadenitis of submandibular gland

## 2022-03-22 NOTE — PROGRESS NOTES
Subjective:    Maire Kocher   68 y.o.   1945     Followup Visit    Location - left neck    Quality - neck swelling    Severity -  moderate    Duration - years    Timing - intermittent    Context - pt with DM2, thyroid nodules, was sent for US thyroid found to have MNG but also sialolithiasis/adenitis left submandibular; she does c/o swelling and pain intermittently left submandibular also, some discomfort in throat when swallowing    Modifying Features - none    Associated symptoms/signs - dry mouth    1/25/22 - fu today preop for left submax gland excision; pt still c/o aching left neck in the area of gland; also dry mouth    2/22/22 - 10 day f/u post left submandibular excision - some pain still but doing well overall    3/22/22 - 1 mo f/u - pt now about 6 weeks postop - overall doing well, she has some \"sore throat\" but no swelling     Past Medical History:   Diagnosis Date    Adjustment disorder with depressed mood 4/10/2017    Adrenal adenoma 4/10/2017    Anxiety disorder 4/10/2017    Arthritis     Atherosclerosis     Backache 7/31/2020    Benign essential hypertension 7/31/2020    Breast cancer ([facility] [address]     Carcinoma in situ of breast 7/31/2020    Cataract 4/10/2017    Chronic kidney disease, stage 3 (moderate) 9/5/2019    Coronary arteriosclerosis 4/10/2017    Dehydration 7/31/2020    Depressive disorder 4/10/2017    Dyslipidemia 4/10/2017    Dyspnea 7/31/2020    Endocrine disorder     Gastroesophageal reflux disease 4/10/2017    Gout     Heart disease     Heartburn     Hirsutism 4/10/2017    History of chemotherapy     History of malignant neoplasm of breast 7/31/2020    HTN (hypertension)     Hyperkalemia 4/10/2017    Hyperlipidemia     Hypertensive disorder 7/31/2020    Hypertensive renal disease 7/31/2020    Hyperthyroidism     Insomnia 4/10/2017    Kidney disease     Lesion of skin of face 4/10/2017    Menopause     Mixed hyperlipidemia 9/5/2019    Mixed hyperlipidemia due to type 2 diabetes mellitus ([facility] [address] 7/31/2020    Radiation therapy complication     Stone of salivary gland 12/2021    Subcutaneous mass of neck 3/25/2019    Thyroid disease     Thyroid nodule 9/5/2019    Tobacco user 4/10/2017    Type 2 diabetes mellitus with hyperglycemia ([facility] [address] 9/5/2019    Varicose vein of leg     Vitamin D deficiency 4/10/2017     Past Surgical History:   Procedure Laterality Date    HX BREAST BIOPSY  08/18/2011    BIOPSY/REMOVAL LYMPH NODES    HX CATARACT REMOVAL  11/22/2016    HX MASTECTOMY      HX OTHER SURGICAL  01/01/2004    Neck surgery    HX PARTIAL HYSTERECTOMY  01/01/1980    HX TONSILLECTOMY  01/01/1950    IR FINE NEEDLE ASPIRATION THYROID  12/21/2020    IR FINE NEEDLE ASPIRATION THYROID  12/21/2020    SD BREAST SURGERY PROCEDURE UNLISTED Right 01/20/2014    Drainage of fluid    SD BREAST SURGERY PROCEDURE UNLISTED  04/03/2013    BIOPSY BREAST PERCUT W/IMAGE    SD BREAST SURGERY PROCEDURE UNLISTED  07/26/2012    PLACE BREAST CLIP PERCUT    SD BREAST SURGERY PROCEDURE UNLISTED  07/26/2012    BIOPSY BREAST PERCUT W/DEVICE    SD BREAST SURGERY PROCEDURE UNLISTED  08/18/2011    BIOPSY BREAST PERCUT W/IMAGE    SD BREAST SURGERY PROCEDURE UNLISTED  08/11/2011    PLACE BREAST CLIP PERCUT    SD BREAST SURGERY PROCEDURE UNLISTED  08/11/2011    BIOPSY BREAST PERCUT W/DEVICE      Family History   Problem Relation Age of Onset    Gall Bladder Disease Mother     Diabetes Sister     Headache Sister     Hypertension Sister     Lung Disease Sister    [facility] Migraines Sister     Alcohol abuse Brother     Cancer Brother     Hypertension Brother     Alcohol abuse Maternal Aunt     Cancer Maternal Aunt     Diabetes Maternal Aunt     Breast Cancer Maternal Aunt     Alcohol abuse Maternal Uncle     Alcohol abuse Paternal Uncle     Diabetes Maternal Grandmother     Heart Disease Maternal Grandmother     Hypertension Maternal Grandmother     Asthma Child  Prostate Cancer Father     Breast Cancer Maternal Aunt      Social History     Tobacco Use    Smoking status: Current Every Day Smoker     Packs/day: 0.50     Years: 60.00     Pack years: 30.00    Smokeless tobacco: Never Used    Tobacco comment: 1/2 pack per day. Substance Use Topics    Alcohol use: No      Prior to Admission medications    Medication Sig Start Date End Date Taking? Authorizing Provider   fenofibrate nanocrystallized (TRICOR) 145 mg tablet Take 1 Tablet by mouth daily for 90 days. 1/19/22 4/19/22  Divya Esteban MD   atorvastatin (LIPITOR) 40 mg tablet Take 1 Tablet by mouth nightly for 90 days. 1/19/22 4/19/22  Divya Esteban MD   linaGLIPtin (Tradjenta) 5 mg tablet Take 1 tablet by mouth once daily for 30 days 1/19/22   Divya Esteban MD   traZODone (DESYREL) 50 mg tablet TAKE 1 TABLET BY MOUTH EVERY DAY AT BEDTIME AS NEEDED FOR SLEEP 9/7/21   Provider, Historical   glucose blood VI test strips (OneTouch Verio test strips) strip Use to check glucose once a day 10/20/21   Divya Esteban MD   lancets (One Touch Delica) 33 gauge misc USE 1  TO CHECK GLUCOSE TWICE DAILY  DX: E11.9 NPI: [id number] 10/20/21   Divya Esteban MD   diclofenac (VOLTAREN) 1 % gel APPLY 2 GRAMS TOPICALLY TO AFFECTED AREA 4 TIMES DAILY 9/7/21   MADIHA Valenzuela   methIMAzole (TAPAZOLE) 5 mg tablet Take half tablet every day, 90 days 7/21/21   Divya Esteban MD   allopurinoL (ZYLOPRIM) 100 mg tablet Take 2 tablets by mouth once daily 6/6/21   Karan Raphael MD   FLUoxetine (PROzac) 20 mg capsule  1/20/21   Provider, Historical   cyanocobalamin (Vitamin B-12) 1,000 mcg tablet Take 1,000 mcg by mouth daily.     Provider, Historical   omeprazole (PRILOSEC) 40 mg capsule Take 1 capsule by mouth once daily in the morning 1/18/21   Karan Raphael MD   Colcrys 0.6 mg tablet Take 1 tablet by mouth once daily 1/10/21   Karan Raphael MD   sodium bicarbonate 650 mg tablet TAKE 2 TABLETS BY MOUTH TWICE DAILY 11/13/20 Provider, Historical   albuterol (Ventolin HFA) 90 mcg/actuation inhaler Take 2 Puffs by inhalation every four (4) hours as needed for Wheezing. 10/22/20   Malini Guillory MD   amLODIPine (NORVASC) 5 mg tablet Take 1 Tab by mouth daily. 10/22/20   Malini Guillory MD   cholecalciferol, vitamin D3, (Vitamin D3) 50 mcg (2,000 unit) tab Take  by mouth. Provider, Historical   OneTouch Verio Flex meter misc USE TO CHECK BLOOD SUGAR TWICE A DAY    Provider, Historical   metoprolol succinate (TOPROL-XL) 25 mg XL tablet TAKE 1 2 (ONE HALF) TABLET BY MOUTH ONCE DAILY 9/26/19   Provider, Historical   vitamin E (AQUA GEMS) 400 unit capsule Take 400 Units by mouth daily. Provider, Historical   multivit-minerals/folic acid (ADULT MULTIVITAMIN GUMMIES PO) Take 2 Tabs by mouth daily. Provider, Historical   losartan (COZAAR) 100 mg tablet Take 100 mg by mouth daily. Provider, Historical        Allergies   Allergen Reactions    Iodinated Contrast Media Anaphylaxis    Codeine Itching         Objective:     Visit Vitals  /78 (BP 1 Location: Left upper arm, BP Patient Position: Sitting, BP Cuff Size: Adult)   Pulse 87   Temp 98.3 °F (36.8 °C) (Temporal)   Resp 18   Ht 5' 4.5\" (1.638 m)   Wt 177 lb (80.3 kg)   SpO2 96%   BMI 29.91 kg/m²      NAD, voice clear  Neck incision intact, healed well  No further lower lip weakness  Tongue mobile    Pathology:  Left submandibular gland, resection:          Chronic sialadenitis with fibrosis and dilated salivary gland duct   containing calculus material.       No evidence of neoplasm. Assessment/Plan:     Encounter Diagnoses   Name Primary?  Sialoadenitis of submandibular gland Yes     Doing well postop  Pt reassured she is healing properly  Fu prn    Salbador CROSS.  Keisha Ray MD, [address] [facility]    [address]  [place] [facility]    [address] [address]  [place] [phone] [phone]

## 2022-03-23 DIAGNOSIS — E11.65 TYPE 2 DIABETES MELLITUS WITH HYPERGLYCEMIA, WITHOUT LONG-TERM CURRENT USE OF INSULIN (HCC): ICD-10-CM

## 2022-03-23 RX ORDER — LANCETS 33 GAUGE
EACH MISCELLANEOUS
Qty: 300 LANCET | Refills: 1 | Status: SHIPPED | OUTPATIENT
Start: 2022-03-23 | End: 2022-07-20 | Stop reason: SDUPTHER

## 2022-04-20 ENCOUNTER — OFFICE VISIT (OUTPATIENT)
Dept: ENDOCRINOLOGY | Age: 77
End: 2022-04-20
Payer: MEDICARE

## 2022-04-20 VITALS
WEIGHT: 171 LBS | OXYGEN SATURATION: 93 % | SYSTOLIC BLOOD PRESSURE: 131 MMHG | DIASTOLIC BLOOD PRESSURE: 76 MMHG | HEIGHT: 65 IN | HEART RATE: 93 BPM | TEMPERATURE: 98.3 F | BODY MASS INDEX: 28.49 KG/M2 | RESPIRATION RATE: 18 BRPM

## 2022-04-20 DIAGNOSIS — E11.65 TYPE 2 DIABETES MELLITUS WITH HYPERGLYCEMIA, WITHOUT LONG-TERM CURRENT USE OF INSULIN (HCC): Primary | ICD-10-CM

## 2022-04-20 DIAGNOSIS — E78.2 MIXED HYPERLIPIDEMIA: ICD-10-CM

## 2022-04-20 DIAGNOSIS — N18.32 STAGE 3B CHRONIC KIDNEY DISEASE (HCC): ICD-10-CM

## 2022-04-20 DIAGNOSIS — E05.90 SUBCLINICAL HYPERTHYROIDISM: ICD-10-CM

## 2022-04-20 LAB
GLUCOSE POC: 159 MG/DL
HBA1C MFR BLD HPLC: 6.3 %

## 2022-04-20 PROCEDURE — 1090F PRES/ABSN URINE INCON ASSESS: CPT | Performed by: INTERNAL MEDICINE

## 2022-04-20 PROCEDURE — G8417 CALC BMI ABV UP PARAM F/U: HCPCS | Performed by: INTERNAL MEDICINE

## 2022-04-20 PROCEDURE — 83036 HEMOGLOBIN GLYCOSYLATED A1C: CPT | Performed by: INTERNAL MEDICINE

## 2022-04-20 PROCEDURE — 99214 OFFICE O/P EST MOD 30 MIN: CPT | Performed by: INTERNAL MEDICINE

## 2022-04-20 PROCEDURE — 1101F PT FALLS ASSESS-DOCD LE1/YR: CPT | Performed by: INTERNAL MEDICINE

## 2022-04-20 PROCEDURE — G8536 NO DOC ELDER MAL SCRN: HCPCS | Performed by: INTERNAL MEDICINE

## 2022-04-20 PROCEDURE — G8427 DOCREV CUR MEDS BY ELIG CLIN: HCPCS | Performed by: INTERNAL MEDICINE

## 2022-04-20 PROCEDURE — 82962 GLUCOSE BLOOD TEST: CPT | Performed by: INTERNAL MEDICINE

## 2022-04-20 PROCEDURE — 3044F HG A1C LEVEL LT 7.0%: CPT | Performed by: INTERNAL MEDICINE

## 2022-04-20 PROCEDURE — G8752 SYS BP LESS 140: HCPCS | Performed by: INTERNAL MEDICINE

## 2022-04-20 PROCEDURE — G9717 DOC PT DX DEP/BP F/U NT REQ: HCPCS | Performed by: INTERNAL MEDICINE

## 2022-04-20 PROCEDURE — G8754 DIAS BP LESS 90: HCPCS | Performed by: INTERNAL MEDICINE

## 2022-04-20 PROCEDURE — G8400 PT W/DXA NO RESULTS DOC: HCPCS | Performed by: INTERNAL MEDICINE

## 2022-04-20 RX ORDER — LINAGLIPTIN 5 MG/1
TABLET, FILM COATED ORAL
Qty: 90 TABLET | Refills: 1 | Status: SHIPPED | OUTPATIENT
Start: 2022-04-20 | End: 2022-07-20

## 2022-04-20 NOTE — PROGRESS NOTES
History and Physical    Patient: Breana Garrison MRN: [id number]  SSN: [id number]    YOB: 1945  Age: 68 y.o. Sex: female      Subjective:      Breana Garrison is a 68 y.o. female with past medical history of hypertension, hyperlipidemia, chronic kidney disease stage III, depression, breast cancer status post surgery, radiation is sent to me by primary care physician Dr. Edgar Mccarthy for type 2 diabetes mellitus. patient is a poor historian. At the last visit we continue Tradjenta 5 mg daily. Patient is reporting compliance with medications. Checks blood glucose every other day. Readings are generally in the low to mid 100s. Did not bring glucometer today. Lost 6 pounds since last visit. She had surgery for left salivary gland recently and she is doing well. Glucometer reading: Did not bring glucometer today    uupdated diabetes history:  · Diagnosis: 4 years    · Current treatment: Tradjenta 5 mg daily    · Past treatment: glimepiride (hypoglycemia,, chronic kidney disease stage III), glipizide (?hypoglycemia)     · Glucose checks: once a day    · Hyperglycemia: no    · Hypoglycemia: no    · Meals per day: 2, brunch: cheese, cereal etc, dinner: cabbage, or fast food, snacks: crackers     · Exercise: no    · DM related hospitalizations: no        Complications of DM:    · CAD: no    · CVA: no    · PVD: no    · Amputations: no     · Retinopathy: no; last exam was 6-2021    · Gastropathy: no    · Nephropathy: yes    · Neuropathy: no        Medications:    · Statin: atorvastatin    · ACE-I: losartan    · ASA: yes        · Diabetes education: no    Subclinical hyperthyroidism:  Initially noted in labs in September 2016. Started on methimazole 2.5 mg daily in June 2017. Patient was previously seeing endocrinologist Dr. Calixto Chowdary for this. Patient tells me that recently methimazole dose was increased from 2.5 mg to 5 mg.   Patient does not know why this was done but she tells she has been taking this for less than 1 month. Energy level is poor, which is not new, sleep is poor, again not new. Bowel movements regular, no heat or cold intolerance, no palpitations. Labs done after the last visit came back showing low free T4 and normal TSH. At that time methimazole was decreased back to 2.5 mg daily. However, patient has not noticed any change in her energy level or sleep. Continues to have poor sleep. Labs done after the last visit showed normal TSH. So we continued methimazole 2.5 mg daily. She has lost 3 pounds since last visit 3 months back. She is having trouble sleeping at night. Denies any palpitations or tremors. Labs done after the last visit showed normal TSH so we continued methimazole 2.5 mg daily. Multinodular goiter:  Thyroid ultrasound August 2019:  Right lobe has 4.5 x 2.4 x 4 cm complex cyst    Left lobe has 1.2 x 0.9 x 1 cm solid nodule in the upper part,  1.6 x 1.7 x 1.1 cm cystic and solid nodule in the mid region  1.8 x 1.3 x 2.1 cm solid nodule in the inferior region    Right lobe complex nodule and left lobe 2.1 cm nodules were biopsied in October 2019. Right lobe nodule resulted as benign, left lobe nodule insufficient cells. Patient denies any difficulty in swallowing but she feels like the nodules in her neck are increasing in size. No family history of thyroid cancer. Patient had breast cancer. Thyroid ultrasound done in December 2020 came back showing right lobe 3.5 cm nodule and left lobe 2.5 cm nodule which met criteria for biopsy. This nodule was biopsied and it was resulted as benign. Patient is reporting some difficulty in swallowing when she lays down. However, this is not new. She is noticing enlarged lymph node in the submandibular area which is tender to palpation, which appears on and off. Denies any dental infections.     Last ultrasound in October 2021:  Showed several nodules in the thyroid, none of them meet criteria for biopsy. It showed enlarged left submandibular salivary gland with edema and swelling and possible calculus. Patient was treated with antibiotics for 10 days and referred to ENT.     Past Medical History:   Diagnosis Date    Adjustment disorder with depressed mood 4/10/2017    Adrenal adenoma 4/10/2017    Anxiety disorder 4/10/2017    Arthritis     Atherosclerosis     Backache 7/31/2020    Benign essential hypertension 7/31/2020    Breast cancer ([address]     Carcinoma in situ of breast 7/31/2020    Cataract 4/10/2017    Chronic kidney disease, stage 3 (moderate) 9/5/2019    Coronary arteriosclerosis 4/10/2017    Dehydration 7/31/2020    Depressive disorder 4/10/2017    Dyslipidemia 4/10/2017    Dyspnea 7/31/2020    Endocrine disorder     Gastroesophageal reflux disease 4/10/2017    Gout     Heart disease     Heartburn     Hirsutism 4/10/2017    History of chemotherapy     History of malignant neoplasm of breast 7/31/2020    HTN (hypertension)     Hyperkalemia 4/10/2017    Hyperlipidemia     Hypertensive disorder 7/31/2020    Hypertensive renal disease 7/31/2020    Hyperthyroidism     Insomnia 4/10/2017    Kidney disease     Lesion of skin of face 4/10/2017    Menopause     Mixed hyperlipidemia 9/5/2019    Mixed hyperlipidemia due to type 2 diabetes mellitus ([address] 7/31/2020    Radiation therapy complication     Stone of salivary gland 12/2021    Subcutaneous mass of neck 3/25/2019    Thyroid disease     Thyroid nodule 9/5/2019    Tobacco user 4/10/2017    Type 2 diabetes mellitus with hyperglycemia ([address] 9/5/2019    Varicose vein of leg     Vitamin D deficiency 4/10/2017     Past Surgical History:   Procedure Laterality Date    HX BREAST BIOPSY  08/18/2011    BIOPSY/REMOVAL LYMPH NODES    HX CATARACT REMOVAL  11/22/2016    HX MASTECTOMY      HX OTHER SURGICAL  01/01/2004    Neck surgery    HX PARTIAL HYSTERECTOMY  01/01/1980    HX TONSILLECTOMY  01/01/1950    IR FINE NEEDLE ASPIRATION THYROID  12/21/2020    IR FINE NEEDLE ASPIRATION THYROID  12/21/2020    [facility] BREAST SURGERY PROCEDURE UNLISTED Right 01/20/2014    Drainage of fluid    [facility] BREAST SURGERY PROCEDURE UNLISTED  04/03/2013    BIOPSY BREAST PERCUT W/IMAGE    NH BREAST SURGERY PROCEDURE UNLISTED  07/26/2012    PLACE BREAST CLIP PERCUT    NH BREAST SURGERY PROCEDURE UNLISTED  07/26/2012    BIOPSY BREAST PERCUT W/DEVICE    [facility] BREAST SURGERY PROCEDURE UNLISTED  08/18/2011    BIOPSY BREAST PERCUT W/IMAGE    NH BREAST SURGERY PROCEDURE UNLISTED  08/11/2011    PLACE BREAST CLIP PERCUT    [facility] BREAST SURGERY PROCEDURE UNLISTED  08/11/2011    BIOPSY BREAST PERCUT W/DEVICE      Family History   Problem Relation Age of Onset    Gall Bladder Disease Mother     Diabetes Sister     Headache Sister     Hypertension Sister     Lung Disease Sister    Selina Smiles Migraines Sister     Alcohol abuse Brother     Cancer Brother     Hypertension Brother     Alcohol abuse Maternal Aunt     Cancer Maternal Aunt     Diabetes Maternal Aunt     Breast Cancer Maternal Aunt     Alcohol abuse Maternal Uncle     Alcohol abuse Paternal Uncle     Diabetes Maternal Grandmother     Heart Disease Maternal Grandmother     Hypertension Maternal Grandmother     Asthma Child     Prostate Cancer Father     Breast Cancer Maternal Aunt      Social History     Tobacco Use    Smoking status: Current Every Day Smoker     Packs/day: 0.50     Years: 60.00     Pack years: 30.00    Smokeless tobacco: Never Used    Tobacco comment: 1/2 pack per day. Substance Use Topics    Alcohol use: No      Prior to Admission medications    Medication Sig Start Date End Date Taking?  Authorizing Provider   linaGLIPtin (Tradjenta) 5 mg tablet Take 1 tablet by mouth once daily for 30 days 4/20/22  Yes Melly Castillo MD   lancets (One Touch Delica) 33 gauge misc USE 1  TO CHECK GLUCOSE TWICE DAILY E11.65 3/23/22  Yes Melly Castillo MD   traZODone (DESYREL) 50 mg tablet TAKE 1 TABLET BY MOUTH EVERY DAY AT BEDTIME AS NEEDED FOR SLEEP 9/7/21  Yes Provider, Historical   glucose blood VI test strips (OneTouch Verio test strips) strip Use to check glucose once a day 10/20/21  Yes Jerrell Meraz MD   diclofenac (VOLTAREN) 1 % gel APPLY 2 GRAMS TOPICALLY TO AFFECTED AREA 4 TIMES DAILY 9/7/21  Yes MADIHA Galarza   methIMAzole (TAPAZOLE) 5 mg tablet Take half tablet every day, 90 days 7/21/21  Yes Jerrell Meraz MD   allopurinoL (ZYLOPRIM) 100 mg tablet Take 2 tablets by mouth once daily 6/6/21  Yes Lucia Bertrand MD   FLUoxetine (PROzac) 20 mg capsule  1/20/21  Yes Provider, Historical   cyanocobalamin (Vitamin B-12) 1,000 mcg tablet Take 1,000 mcg by mouth daily. Yes Provider, Historical   omeprazole (PRILOSEC) 40 mg capsule Take 1 capsule by mouth once daily in the morning 1/18/21  Yes Lucia Bertrand MD   Colcrys 0.6 mg tablet Take 1 tablet by mouth once daily 1/10/21  Yes Lucia Bertrand MD   sodium bicarbonate 650 mg tablet TAKE 2 TABLETS BY MOUTH TWICE DAILY 11/13/20  Yes Provider, Historical   albuterol (Ventolin HFA) 90 mcg/actuation inhaler Take 2 Puffs by inhalation every four (4) hours as needed for Wheezing. 10/22/20  Yes Lucia Bertrand MD   amLODIPine (NORVASC) 5 mg tablet Take 1 Tab by mouth daily. 10/22/20  Yes Lucia Bertrand MD   cholecalciferol, vitamin D3, (Vitamin D3) 50 mcg (2,000 unit) tab Take  by mouth. Yes Provider, Historical   OneTouch Verio Flex meter misc USE TO CHECK BLOOD SUGAR TWICE A DAY   Yes Provider, Historical   metoprolol succinate (TOPROL-XL) 25 mg XL tablet TAKE 1 2 (ONE HALF) TABLET BY MOUTH ONCE DAILY 9/26/19  Yes Provider, Historical   vitamin E (AQUA GEMS) 400 unit capsule Take 400 Units by mouth daily. Yes Provider, Historical   multivit-minerals/folic acid (ADULT MULTIVITAMIN GUMMIES PO) Take 2 Tabs by mouth daily. Yes Provider, Historical   losartan (COZAAR) 100 mg tablet Take 100 mg by mouth daily.    Yes Provider, Historical   fenofibrate nanocrystallized (TRICOR) 145 mg tablet Take 1 Tablet by mouth daily for 90 days. 1/19/22 4/19/22  Mayank Isabel MD   atorvastatin (LIPITOR) 40 mg tablet Take 1 Tablet by mouth nightly for 90 days. 1/19/22 4/19/22  Mayank Isabel MD        Allergies   Allergen Reactions    Iodinated Contrast Media Anaphylaxis    Codeine Itching       Review of Systems:  ROS    A comprehensive review of systems was preformed and it is negative except mentioned in HPI    Objective:     Vitals:    04/20/22 1326   BP: 131/76   Pulse: 93   Resp: 18   Temp: 98.3 °F (36.8 °C)   TempSrc: Oral   SpO2: 93%   Weight: 171 lb (77.6 kg)   Height: 5' 4.5\" (1.638 m)        Physical Exam:    Physical Exam  Vitals and nursing note reviewed. Constitutional:       Appearance: Normal appearance. HENT:      Head: Normocephalic and atraumatic. Neck:      Comments: No cervical lymphadenopathy  Cardiovascular:      Rate and Rhythm: Normal rate and regular rhythm. Pulmonary:      Effort: Pulmonary effort is normal.      Breath sounds: Normal breath sounds. Neurological:      Mental Status: She is alert. Labs and Imaging:  Results for [place] Juan (MRN [id number]) as of 2/17/2021 14:51   Ref. Range 9/18/2017 00:00 8/11/2020 13:43 10/21/2020 12:29 1/6/2021 14:18   T4, Free Latest Ref Range: 0.82 - 1.77 ng/dL 0.51 (L) 0.81 (L) 0.86 0.69 (L)   TSH Latest Ref Range: 0.450 - 4.500 uIU/mL 1.800  0.788 4.350       Thyroid US 12-:  FINDINGS: Thyroid ultrasound. Right lobe 3.4 x 2.5 x 4.5 cm. Left lobe enlarged  5.3 x 2.7 x 3.0 cm. Isthmus thickened 0.6 cm.     Right lobe complex cyst versus solid nodule measures approximately 3.5 x 1.8 x  2.8 cm. TI-RADS 3. Recommend FNA.     Left lobe contains multiple nodules:  Left upper lobe anterior isoechoic well-defined 1.7 x 1.2 x by 1.4 cm. It is  difficult to tell if this nodule has microcalcifications. TI-RADS 3-4. Consider  FNA.   Left lobe posterior complex cystic and solid 2.5 x 1.5 x 1.6 cm equivocally  taller rather than wide. TI-RADS 3-4. Recommend FNA. Anterior complex cyst 1.5 x 1.2 x 2.3 cm. TI-RADS 3. Recommend annual  sonographic follow-up. Last 3 Recorded Weights in this Encounter    04/20/22 1326   Weight: 171 lb (77.6 kg)        Lab Results   Component Value Date/Time    Hemoglobin A1c 6.1 (H) 10/13/2021 10:56 AM    Hemoglobin A1c, External 6.7 03/04/2020 12:00 AM    Hemoglobin A1c, External 6.8 01/26/2018 12:00 AM        Assessment:     Patient Active Problem List   Diagnosis Code    Chronic tension headache G44.229    Medication overuse headache G44.40    Chronic non-specific white matter lesions on MRI G93.9    Mild cerebral atrophy (HCC) G31.9    Subclinical hyperthyroidism E05.90    Coarse tremors G25.2    Severe obesity (HCC) E66.01    Adjustment disorder with depressed mood F43.21    Adrenal adenoma D35.00    Anxiety disorder F41.9    Cataract H26.9    Stage 3 chronic kidney disease N18.30    Coronary arteriosclerosis I25.10    Depressive disorder F32. A    Dyslipidemia E78.5    Gastroesophageal reflux disease K21.9    Hirsutism L68.0    Hyperkalemia E87.5    Insomnia G47.00    Mixed hyperlipidemia E78.2    Thyroid nodule E04.1    Type 2 diabetes mellitus with hyperglycemia (HCC) E11.65    Vitamin D deficiency E55.9    Tobacco user Z72.0    Lesion of skin of face L98.9    Subcutaneous mass of neck R22.1    Backache M54.9    Carcinoma in situ of breast D05.90    Dehydration E86.0    Dyspnea R06.00    History of malignant neoplasm of breast Z85.3    Hypertensive disorder I10    Hypertensive renal disease I12.9    Mixed hyperlipidemia due to type 2 diabetes mellitus (HCC) E11.69, E78.2    Heartburn R12    Kidney disease N28.9    Headache disorder R51.9    Gout M10.9    Tremor R25.1    Sialoadenitis of submandibular gland K11.20    Polyp of colon K63.5    Type 2 diabetes mellitus with hyperglycemia, without long-term current use of insulin ([facility] E11.65    Multinodular goiter E04.2    Inflammation of submandibular gland K11.20    Status post surgery Z98.890    Stage 3b chronic kidney disease ([email or web address] N18.32           Plan:     type II diabetes mellitus well controlled  Hemoglobin A1c was 7% in April 2019, 6.5% on 9-5-2019, 6.3% on 12-5-2019, 6.7% in 3-2020, 6.7% on 12-2-2020, 6.4% on 4-, 6.4% on 7-, 6.1 on 10-, 6.2% on 1-, 6.3% today. Fingerstick blood glucose is 159 mg/dL in my office today. Up to date with diabetes related annual labs: yes 10-    Up to date with diabetic eye exam: 6-2021    plan:  Glucose control looks acceptable. I will stop all Tradjenta and monitor her without any medications for next 3 months. Check blood glucose every other day and bring glucometer to next visit in 3 months. hypertensive renal disease  blood pressure well controlled on current medications. Continue the same. chronic kidney disease stage 3  GFR 38 in August 2019, 37 in May 2021, 36 in October 2021, 45 in March 2022. Following with nephrologist Dr Apple Longo.    hyperthyroidism  on methimazole 2.5 mg daily. TSH was normal in August 2019 and October 2020, free T4 was low. However, methimazole was increased to 5 mg daily in November 2020.  1-6-2021:  TSH normal at 4.35  Free T4 low at 0.69  Methimazole was decreased to 2.5 mg daily    4-:  TSH normal at 0.532  Free T4 normal at 0.98  Methimazole 2.5 mg daily was continued  Pulse rate is elevated today, patient is having trouble sleeping. 10-:  TSH normal at 2.12  Methimazole 2.5 mg daily was continued  Plan:  Check TSH today. I will send refill on methimazole based on the result. Advised patient to talk to PCP about insomnia because it is not related to her thyroid. mixed hyperlipidemia  LDL was 66 in April 2019, 53 in January 2021, 10-: Total cholesterol 188, triglycerides 349, LDL 97.  Continue atorvastatin 40 mg daily and patient was started on fenofibrate 145 mg daily. I will check lipid profile in a few months. thyroid nodule  Thyroid ultrasound 12-: Right lobe 3.5 and left lobe 2.5 cm TR 4 nodules meet criteria for biopsy. Thyroid biopsy of these nodules on 12- were resulted as benign. Thyroid ultrasound 10-: Right lobe midpole 2.1 x 1.8 cm cystic nodule consistent with colloid cyst.  Right lobe midpole mixed solid and cystic isoechoic nodule 1.3 x 0.7 cm, TR 2  Right lower lobe isoechoic solid nodule 1.8 x 1.6 cm, TR 3  Left lobe:  Left upper lobe mixed solid and cystic isoechoic nodule 1.7 x 1.4 cm, TR 2  Left mid lobe isoechoic solid and cystic nodule 1.9 x 1.7 cm, TR 2  Left lower lobe isoechoic solid and cystic nodule 2.2 x 2.2 cm, TR 2  Left medial midpole isoechoic mixed solid and cystic nodule 1.4 x 1.4 cm, TR 2  No indication for repeat biopsy  Plan:  Repeat ultrasound in 1 year. history of malignant neoplasm of breast  status post surgery, radiation. Now on letrozole. Left submandibular gland sialolithiasis and sialadenitis:  S/p surgery in February 2022. Orders Placed This Encounter    TSH AND FREE T4    AMB POC GLUCOSE BLOOD, BY GLUCOSE MONITORING DEVICE    AMB POC HEMOGLOBIN A1C    linaGLIPtin (Tradjenta) 5 mg tablet     Sig: Take 1 tablet by mouth once daily for 30 days     Dispense:  90 Tablet     Refill:  1     Please DC Tradjenta and glipizide. Patient does not need it anymore.         Signed By: Lizzy Coleman MD     April 20, 2022      Return to clinic 3 months

## 2022-04-20 NOTE — LETTER
4/20/2022    Patient: Mary Sims   YOB: 1945   Date of Visit: 4/20/2022     Joshua Kearney MD  [phone] [id number]  Via Fax: [phone]    Dear Joshua Kearney MD,      Thank you for referring Ms. Kadeem Matamoros to [place] [address] for evaluation. My notes for this consultation are attached. If you have questions, please do not hesitate to call me. I look forward to following your patient along with you.       Sincerely,    Yvonne Lion MD

## 2022-04-20 NOTE — PROGRESS NOTES
1. \"Have you been to the ER, urgent care clinic since your last visit? Hospitalized since your last visit? \" No    2. \"Have you seen or consulted any other health care providers outside of the [address] [place] [address] since your last visit? \" Yes When: feb 2022 Where: [place] Reason for visit: Christgaye [place] gland removed     3. For patients aged 39-70: Has the patient had a colonoscopy / FIT/ Cologuard? Yes - Care Gap present. Most recent result on file      If the patient is female:    4. For patients aged 41-77: Has the patient had a mammogram within the past 2 years? Yes - Care Gap present. Most recent result on file      5. For patients aged 21-65: Has the patient had a pap smear?  NA - based on age or sex

## 2022-04-21 ENCOUNTER — TELEPHONE (OUTPATIENT)
Dept: ENDOCRINOLOGY | Age: 77
End: 2022-04-21

## 2022-04-21 DIAGNOSIS — E05.90 HYPERTHYROIDISM: ICD-10-CM

## 2022-04-21 LAB
T4 FREE SERPL-MCNC: 1.13 NG/DL (ref 0.82–1.77)
TSH SERPL DL<=0.005 MIU/L-ACNC: 2.33 UIU/ML (ref 0.45–4.5)

## 2022-04-21 RX ORDER — METHIMAZOLE 5 MG/1
TABLET ORAL
Qty: 45 TABLET | Refills: 1 | Status: SHIPPED | OUTPATIENT
Start: 2022-04-21 | End: 2022-07-20 | Stop reason: SDUPTHER

## 2022-07-08 RX ORDER — DICLOFENAC SODIUM 10 MG/G
GEL TOPICAL
Qty: 500 G | Refills: 0 | Status: SHIPPED | OUTPATIENT
Start: 2022-07-08

## 2022-07-20 ENCOUNTER — OFFICE VISIT (OUTPATIENT)
Dept: ENDOCRINOLOGY | Age: 77
End: 2022-07-20
Payer: MEDICARE

## 2022-07-20 ENCOUNTER — TELEPHONE (OUTPATIENT)
Dept: ENDOCRINOLOGY | Age: 77
End: 2022-07-20

## 2022-07-20 VITALS
HEIGHT: 65 IN | BODY MASS INDEX: 29.16 KG/M2 | HEART RATE: 80 BPM | DIASTOLIC BLOOD PRESSURE: 71 MMHG | OXYGEN SATURATION: 94 % | TEMPERATURE: 97.7 F | WEIGHT: 175 LBS | SYSTOLIC BLOOD PRESSURE: 110 MMHG

## 2022-07-20 DIAGNOSIS — E05.90 HYPERTHYROIDISM: ICD-10-CM

## 2022-07-20 DIAGNOSIS — E78.2 MIXED HYPERLIPIDEMIA: ICD-10-CM

## 2022-07-20 DIAGNOSIS — E11.65 TYPE 2 DIABETES MELLITUS WITH HYPERGLYCEMIA, UNSPECIFIED WHETHER LONG TERM INSULIN USE (HCC): ICD-10-CM

## 2022-07-20 DIAGNOSIS — E04.2 MULTINODULAR GOITER: ICD-10-CM

## 2022-07-20 DIAGNOSIS — E11.65 TYPE 2 DIABETES MELLITUS WITH HYPERGLYCEMIA, WITHOUT LONG-TERM CURRENT USE OF INSULIN (HCC): Primary | ICD-10-CM

## 2022-07-20 LAB
GLUCOSE POC: 156 MG/DL
HBA1C MFR BLD HPLC: 6.2 %

## 2022-07-20 PROCEDURE — G8417 CALC BMI ABV UP PARAM F/U: HCPCS | Performed by: INTERNAL MEDICINE

## 2022-07-20 PROCEDURE — 3044F HG A1C LEVEL LT 7.0%: CPT | Performed by: INTERNAL MEDICINE

## 2022-07-20 PROCEDURE — 99214 OFFICE O/P EST MOD 30 MIN: CPT | Performed by: INTERNAL MEDICINE

## 2022-07-20 PROCEDURE — G8427 DOCREV CUR MEDS BY ELIG CLIN: HCPCS | Performed by: INTERNAL MEDICINE

## 2022-07-20 PROCEDURE — 82962 GLUCOSE BLOOD TEST: CPT | Performed by: INTERNAL MEDICINE

## 2022-07-20 PROCEDURE — G8754 DIAS BP LESS 90: HCPCS | Performed by: INTERNAL MEDICINE

## 2022-07-20 PROCEDURE — G9717 DOC PT DX DEP/BP F/U NT REQ: HCPCS | Performed by: INTERNAL MEDICINE

## 2022-07-20 PROCEDURE — 83036 HEMOGLOBIN GLYCOSYLATED A1C: CPT | Performed by: INTERNAL MEDICINE

## 2022-07-20 PROCEDURE — G8752 SYS BP LESS 140: HCPCS | Performed by: INTERNAL MEDICINE

## 2022-07-20 PROCEDURE — 1090F PRES/ABSN URINE INCON ASSESS: CPT | Performed by: INTERNAL MEDICINE

## 2022-07-20 PROCEDURE — 1123F ACP DISCUSS/DSCN MKR DOCD: CPT | Performed by: INTERNAL MEDICINE

## 2022-07-20 PROCEDURE — G8536 NO DOC ELDER MAL SCRN: HCPCS | Performed by: INTERNAL MEDICINE

## 2022-07-20 PROCEDURE — 1101F PT FALLS ASSESS-DOCD LE1/YR: CPT | Performed by: INTERNAL MEDICINE

## 2022-07-20 PROCEDURE — G8400 PT W/DXA NO RESULTS DOC: HCPCS | Performed by: INTERNAL MEDICINE

## 2022-07-20 RX ORDER — BLOOD SUGAR DIAGNOSTIC
STRIP MISCELLANEOUS
Qty: 100 STRIP | Refills: 3 | Status: SHIPPED | OUTPATIENT
Start: 2022-07-20

## 2022-07-20 RX ORDER — TRAZODONE HYDROCHLORIDE 100 MG/1
TABLET ORAL
COMMUNITY
Start: 2022-06-27

## 2022-07-20 RX ORDER — ATORVASTATIN CALCIUM 40 MG/1
TABLET, FILM COATED ORAL
COMMUNITY
Start: 2022-07-16

## 2022-07-20 RX ORDER — GABAPENTIN 300 MG/1
CAPSULE ORAL
COMMUNITY
Start: 2022-06-30

## 2022-07-20 RX ORDER — LANCETS 33 GAUGE
EACH MISCELLANEOUS
Qty: 100 LANCET | Refills: 3 | Status: SHIPPED | OUTPATIENT
Start: 2022-07-20

## 2022-07-20 RX ORDER — ATORVASTATIN CALCIUM 40 MG/1
40 TABLET, FILM COATED ORAL
Qty: 90 TABLET | Refills: 3 | Status: SHIPPED | OUTPATIENT
Start: 2022-07-20 | End: 2022-10-18

## 2022-07-20 RX ORDER — METHIMAZOLE 5 MG/1
TABLET ORAL
Qty: 45 TABLET | Refills: 3 | Status: SHIPPED | OUTPATIENT
Start: 2022-07-20

## 2022-07-20 RX ORDER — FENOFIBRATE 145 MG/1
145 TABLET, COATED ORAL DAILY
Qty: 90 TABLET | Refills: 3 | Status: SHIPPED | OUTPATIENT
Start: 2022-07-20 | End: 2022-10-18

## 2022-07-20 NOTE — PROGRESS NOTES
History and Physical    Patient: Mounika Cage MRN: [id number]  SSN: [id number]    YOB: 1945  Age: 68 y.o. Sex: female      Subjective:      Mounika Cage is a 68 y.o. female with past medical history of hypertension, hyperlipidemia, chronic kidney disease stage III, depression, breast cancer status post surgery, radiation is sent to me by primary care physician Dr. Roxanne Rhodes for type 2 diabetes mellitus. patient is a poor historian. At the last visit her A1c was good only on Tradjenta 5 mg daily. So I stopped all Tradjenta and patient was advised to continue on her diet. Since the last visit patient has gained 4 pounds. Unable to exercise much. She is checking blood glucose once a day every day. She is due for diabetic eye exam.  Needs to call and make appointment. Glucometer reading: Checking blood glucose once a day every day. Readings are ranging from 126 to 174 mg/dL    uupdated diabetes history:  · Diagnosis: 4 years    · Current treatment: None since April 2022    · Past treatment: glimepiride (hypoglycemia,, chronic kidney disease stage III), glipizide (?hypoglycemia), Tradjenta (not needed)    · Glucose checks: once a day    · Hyperglycemia: no    · Hypoglycemia: no    · Meals per day: 2, brunch: cheese, cereal etc, dinner: cabbage, or fast food, snacks: crackers     · Exercise: no    · DM related hospitalizations: no        Complications of DM:    · CAD: no    · CVA: no    · PVD: no    · Amputations: no     · Retinopathy: no; last exam was 6-2021    · Gastropathy: no    · Nephropathy: yes    · Neuropathy: no        Medications:    · Statin: atorvastatin, fenofibrate    · ACE-I: losartan    · ASA: yes        · Diabetes education: no    Subclinical hyperthyroidism:  Initially noted in labs in September 2016. Started on methimazole 2.5 mg daily in June 2017. Patient was previously seeing endocrinologist Dr. Marti Cantor for this.   Patient tells me that recently methimazole dose was increased from 2.5 mg to 5 mg. Patient does not know why this was done but she tells she has been taking this for less than 1 month. Energy level is poor, which is not new, sleep is poor, again not new. Bowel movements regular, no heat or cold intolerance, no palpitations. Labs done after the last visit came back showing low free T4 and normal TSH. At that time methimazole was decreased back to 2.5 mg daily. However, patient has not noticed any change in her energy level or sleep. Continues to have poor sleep. Labs done after the last visit showed normal TSH. So we continued methimazole 2.5 mg daily. She has lost 3 pounds since last visit 3 months back. She is having trouble sleeping at night. Denies any palpitations or tremors. Labs done after the last visit showed normal TSH so we continued methimazole 2.5 mg daily. Multinodular goiter:  Thyroid ultrasound August 2019:  Right lobe has 4.5 x 2.4 x 4 cm complex cyst    Left lobe has 1.2 x 0.9 x 1 cm solid nodule in the upper part,  1.6 x 1.7 x 1.1 cm cystic and solid nodule in the mid region  1.8 x 1.3 x 2.1 cm solid nodule in the inferior region    Right lobe complex nodule and left lobe 2.1 cm nodules were biopsied in October 2019. Right lobe nodule resulted as benign, left lobe nodule insufficient cells. Patient denies any difficulty in swallowing but she feels like the nodules in her neck are increasing in size. No family history of thyroid cancer. Patient had breast cancer. Thyroid ultrasound done in December 2020 came back showing right lobe 3.5 cm nodule and left lobe 2.5 cm nodule which met criteria for biopsy. This nodule was biopsied and it was resulted as benign. Patient is reporting some difficulty in swallowing when she lays down. However, this is not new. She is noticing enlarged lymph node in the submandibular area which is tender to palpation, which appears on and off.   Denies any dental infections. Last ultrasound in October 2021:  Showed several nodules in the thyroid, none of them meet criteria for biopsy. It showed enlarged left submandibular salivary gland with edema and swelling and possible calculus. Patient was treated with antibiotics for 10 days and referred to ENT.     Past Medical History:   Diagnosis Date    Adjustment disorder with depressed mood 4/10/2017    Adrenal adenoma 4/10/2017    Anxiety disorder 4/10/2017    Arthritis     Atherosclerosis     Backache 7/31/2020    Benign essential hypertension 7/31/2020    Breast cancer ([address]     Carcinoma in situ of breast 7/31/2020    Cataract 4/10/2017    Chronic kidney disease, stage 3 (moderate) 9/5/2019    Coronary arteriosclerosis 4/10/2017    Dehydration 7/31/2020    Depressive disorder 4/10/2017    Dyslipidemia 4/10/2017    Dyspnea 7/31/2020    Endocrine disorder     Gastroesophageal reflux disease 4/10/2017    Gout     Heart disease     Heartburn     Hirsutism 4/10/2017    History of chemotherapy     History of malignant neoplasm of breast 7/31/2020    HTN (hypertension)     Hyperkalemia 4/10/2017    Hyperlipidemia     Hypertensive disorder 7/31/2020    Hypertensive renal disease 7/31/2020    Hyperthyroidism     Insomnia 4/10/2017    Kidney disease     Lesion of skin of face 4/10/2017    Menopause     Mixed hyperlipidemia 9/5/2019    Mixed hyperlipidemia due to type 2 diabetes mellitus ([address] 7/31/2020    Radiation therapy complication     Stone of salivary gland 12/2021    Subcutaneous mass of neck 3/25/2019    Thyroid disease     Thyroid nodule 9/5/2019    Tobacco user 4/10/2017    Type 2 diabetes mellitus with hyperglycemia ([address] 9/5/2019    Varicose vein of leg     Vitamin D deficiency 4/10/2017     Past Surgical History:   Procedure Laterality Date    HX BREAST BIOPSY  08/18/2011    BIOPSY/REMOVAL LYMPH NODES    HX CATARACT REMOVAL  11/22/2016    HX MASTECTOMY      HX OTHER SURGICAL  01/01/2004    Neck surgery    HX PARTIAL HYSTERECTOMY  01/01/1980    HX TONSILLECTOMY  01/01/1950    IR FINE NEEDLE ASPIRATION THYROID  12/21/2020    IR FINE NEEDLE ASPIRATION THYROID  12/21/2020    MD BREAST SURGERY PROCEDURE UNLISTED Right 01/20/2014    Drainage of fluid    MD BREAST SURGERY PROCEDURE UNLISTED  04/03/2013    BIOPSY BREAST PERCUT W/IMAGE    MD BREAST SURGERY PROCEDURE UNLISTED  07/26/2012    PLACE BREAST CLIP PERCUT    MD BREAST SURGERY PROCEDURE UNLISTED  07/26/2012    BIOPSY BREAST PERCUT W/DEVICE    MD BREAST SURGERY PROCEDURE UNLISTED  08/18/2011    BIOPSY BREAST PERCUT W/IMAGE    MD BREAST SURGERY PROCEDURE UNLISTED  08/11/2011    PLACE BREAST CLIP PERCUT    MD BREAST SURGERY PROCEDURE UNLISTED  08/11/2011    BIOPSY BREAST PERCUT W/DEVICE      Family History   Problem Relation Age of Onset    Gall Bladder Disease Mother     Diabetes Sister     Headache Sister     Hypertension Sister     Lung Disease Sister     Migraines Sister     Alcohol abuse Brother     Cancer Brother     Hypertension Brother     Alcohol abuse Maternal Aunt     Cancer Maternal Aunt     Diabetes Maternal Aunt     Breast Cancer Maternal Aunt     Alcohol abuse Maternal Uncle     Alcohol abuse Paternal Uncle     Diabetes Maternal Grandmother     Heart Disease Maternal Grandmother     Hypertension Maternal Grandmother     Asthma Child     Prostate Cancer Father     Breast Cancer Maternal Aunt      Social History     Tobacco Use    Smoking status: Every Day     Packs/day: 0.50     Years: 60.00     Pack years: 30.00     Types: Cigarettes    Smokeless tobacco: Never    Tobacco comments:     1/2 pack per day. Substance Use Topics    Alcohol use: No      Prior to Admission medications    Medication Sig Start Date End Date Taking?  Authorizing Provider   atorvastatin (LIPITOR) 40 mg tablet TAKE 1 TABLET BY MOUTH NIGHTLY FOR 90 DAYS 7/16/22  Yes Provider, Historical   gabapentin (NEURONTIN) 300 mg capsule TAKE 1 CAPSULE BY MOUTH EVERY DAY AT BEDTIME 6/30/22  Yes Provider, Historical   traZODone (DESYREL) 100 mg tablet TAKE 1 TABLET BY MOUTH EVERY DAY AT BEDTIME AS NEEDED FOR SLEEP 6/27/22  Yes Provider, Historical   glucose blood VI test strips (OneTouch Verio test strips) strip Use to check glucose once a day, 90 days 7/20/22  Yes Sean Whittaker MD   lancets (One Touch Delica) 33 gauge misc Use to check glucose once a day, 90 days 7/20/22  Yes Sean Whittaker MD   methIMAzole (TAPAZOLE) 5 mg tablet Take half tablet every day, 90 days 7/20/22  Yes Sean Whittaker MD   fenofibrate nanocrystallized (TRICOR) 145 mg tablet Take 1 Tablet by mouth in the morning for 90 days. 7/20/22 10/18/22 Yes Sean Whittaker MD   atorvastatin (LIPITOR) 40 mg tablet Take 1 Tablet by mouth nightly for 90 days. 7/20/22 10/18/22 Yes Sean Whittaker MD   fenofibrate nanocrystallized (TRICOR) 145 mg tablet Take 1 tablet by mouth once daily for 90 days 7/15/22  Yes Sean Whittaker MD   diclofenac (VOLTAREN) 1 % gel APPLY 2 GRAMS TOPICALLY TO AFFECTED AREA 4 TIMES DAILY 7/8/22  Yes MADIHA Saldaña   allopurinoL (ZYLOPRIM) 100 mg tablet Take 2 tablets by mouth once daily 6/6/21  Yes Cristhian Phillip MD   FLUoxetine (PROzac) 20 mg capsule  1/20/21  Yes Provider, Historical   cyanocobalamin 1,000 mcg tablet Take 1,000 mcg by mouth daily. Yes Provider, Historical   omeprazole (PRILOSEC) 40 mg capsule Take 1 capsule by mouth once daily in the morning 1/18/21  Yes Cristhian Phillip MD   Colcrys 0.6 mg tablet Take 1 tablet by mouth once daily 1/10/21  Yes Cristhian Phillip MD   sodium bicarbonate 650 mg tablet TAKE 2 TABLETS BY MOUTH TWICE DAILY 11/13/20  Yes Provider, Historical   albuterol (Ventolin HFA) 90 mcg/actuation inhaler Take 2 Puffs by inhalation every four (4) hours as needed for Wheezing. 10/22/20  Yes Cristhian Phillip MD   amLODIPine (NORVASC) 5 mg tablet Take 1 Tab by mouth daily. 10/22/20  Yes Cristhian Phillip MD   cholecalciferol, vitamin D3, 50 mcg (2,000 unit) tab Take  by mouth.    Yes Provider, Historical   OneTouch Verio Flex meter misc USE TO CHECK BLOOD SUGAR TWICE A DAY   Yes Provider, Historical   metoprolol succinate (TOPROL-XL) 25 mg XL tablet TAKE 1 2 (ONE HALF) TABLET BY MOUTH ONCE DAILY 9/26/19  Yes Provider, Historical   vitamin E (AQUA GEMS) 400 unit capsule Take 400 Units by mouth daily. Yes Provider, Historical   multivit-minerals/folic acid (ADULT MULTIVITAMIN GUMMIES PO) Take 2 Tabs by mouth daily. Yes Provider, Historical   losartan (COZAAR) 100 mg tablet Take 100 mg by mouth daily. Yes Provider, Historical        Allergies   Allergen Reactions    Iodinated Contrast Media Anaphylaxis    Codeine Itching       Review of Systems:  ROS    A comprehensive review of systems was preformed and it is negative except mentioned in HPI    Objective:     Vitals:    07/20/22 1317   BP: 110/71   Pulse: 80   Temp: 97.7 °F (36.5 °C)   TempSrc: Temporal   SpO2: 94%   Weight: 175 lb (79.4 kg)   Height: 5' 4.5\" (1.638 m)        Physical Exam:    Physical Exam  Vitals and nursing note reviewed. Constitutional:       Appearance: Normal appearance. HENT:      Head: Normocephalic and atraumatic. Neck:      Comments: No cervical lymphadenopathy  Cardiovascular:      Rate and Rhythm: Normal rate and regular rhythm. Pulmonary:      Effort: Pulmonary effort is normal.      Breath sounds: Normal breath sounds. Neurological:      Mental Status: She is alert. Labs and Imaging:  Results for Joe Fenton (MRN [id number]) as of 2/17/2021 14:51   Ref. Range 9/18/2017 00:00 8/11/2020 13:43 10/21/2020 12:29 1/6/2021 14:18   T4, Free Latest Ref Range: 0.82 - 1.77 ng/dL 0.51 (L) 0.81 (L) 0.86 0.69 (L)   TSH Latest Ref Range: 0.450 - 4.500 uIU/mL 1.800  0.788 4.350       Thyroid US 12-:  FINDINGS: Thyroid ultrasound. Right lobe 3.4 x 2.5 x 4.5 cm. Left lobe enlarged  5.3 x 2.7 x 3.0 cm. Isthmus thickened 0.6 cm.      Right lobe complex cyst versus solid nodule measures approximately 3.5 x 1.8 x  2.8 cm. TI-RADS 3. Recommend FNA. Left lobe contains multiple nodules:  Left upper lobe anterior isoechoic well-defined 1.7 x 1.2 x by 1.4 cm. It is  difficult to tell if this nodule has microcalcifications. TI-RADS 3-4. Consider  FNA. Left lobe posterior complex cystic and solid 2.5 x 1.5 x 1.6 cm equivocally  taller rather than wide. TI-RADS 3-4. Recommend FNA. Anterior complex cyst 1.5 x 1.2 x 2.3 cm. TI-RADS 3. Recommend annual  sonographic follow-up. Last 3 Recorded Weights in this Encounter    07/20/22 1317   Weight: 175 lb (79.4 kg)        Lab Results   Component Value Date/Time    Hemoglobin A1c 6.1 (H) 10/13/2021 10:56 AM    Hemoglobin A1c, External 6.7 03/04/2020 12:00 AM    Hemoglobin A1c, External 6.8 01/26/2018 12:00 AM        Assessment:     Patient Active Problem List   Diagnosis Code    Chronic tension headache G44.229    Medication overuse headache G44.40    Chronic non-specific white matter lesions on MRI G93.9    Mild cerebral atrophy (HCC) G31.9    Subclinical hyperthyroidism E05.90    Coarse tremors G25.2    Severe obesity (HCC) E66.01    Adjustment disorder with depressed mood F43.21    Adrenal adenoma D35.00    Anxiety disorder F41.9    Cataract H26.9    Stage 3 chronic kidney disease N18.30    Coronary arteriosclerosis I25.10    Depressive disorder F32. A    Dyslipidemia E78.5    Gastroesophageal reflux disease K21.9    Hirsutism L68.0    Hyperkalemia E87.5    Insomnia G47.00    Mixed hyperlipidemia E78.2    Thyroid nodule E04.1    Type 2 diabetes mellitus with hyperglycemia (HCC) E11.65    Vitamin D deficiency E55.9    Tobacco user Z72.0    Lesion of skin of face L98.9    Subcutaneous mass of neck R22.1    Backache M54.9    Carcinoma in situ of breast D05.90    Dehydration E86.0    Dyspnea R06.00    History of malignant neoplasm of breast Z85.3    Hypertensive disorder I10    Hypertensive renal disease I12.9    Mixed hyperlipidemia due to type 2 diabetes mellitus (HCC) E11.69, E78.2 Heartburn R12    Kidney disease N28.9    Headache disorder R51.9    Gout M10.9    Tremor R25.1    Sialoadenitis of submandibular gland K11.20    Polyp of colon K63.5    Type 2 diabetes mellitus with hyperglycemia, without long-term current use of insulin (HCC) E11.65    Multinodular goiter E04.2    Inflammation of submandibular gland K11.20    Status post surgery Z98.890    Stage 3b chronic kidney disease ([facility] N18.32           Plan:     type II diabetes mellitus well controlled  Hemoglobin A1c was 7% in April 2019, 6.5% on 9-5-2019, 6.3% on 12-5-2019, 6.7% in 3-2020, 6.7% on 12-2-2020, 6.4% on 4-, 6.4% on 7-, 6.1 on 10-, 6.2% on 1-, 6.3% on 4-, 6.2% today. Fingerstick blood glucose is 156 mg/dL in my office today. Up to date with diabetes related annual labs: yes 10-    Up to date with diabetic eye exam: 6-2021    plan:  Given patient's age, glucose control looks acceptable without any medications. Encourage patient to continue to work on her diet, eat in moderation. Check blood glucose every other day and follow-up with new endocrinologist.    hypertensive renal disease  blood pressure well controlled on current medications. Continue the same. chronic kidney disease stage 3  GFR 38 in August 2019, 37 in May 2021, 36 in October 2021, 45 in March 2022. Following with nephrologist Dr Lam Henley.    hyperthyroidism  on methimazole 2.5 mg daily. TSH was normal in August 2019 and October 2020, free T4 was low. However, methimazole was increased to 5 mg daily in November 2020.  1-6-2021:  TSH normal at 4.35  Free T4 low at 0.69  Methimazole was decreased to 2.5 mg daily    4-:  TSH normal at 0.532  Free T4 normal at 0.98  Methimazole 2.5 mg daily was continued  Pulse rate is elevated today, patient is having trouble sleeping.     10-:  TSH normal at 2.12  Methimazole 2.5 mg daily was continued    4-:  TSH normal at 2.33  Free T4 normal at 1.33  Plan:  Continue methimazole 2.5 mg daily. mixed hyperlipidemia  LDL was 66 in April 2019, 53 in January 2021, 10-: Total cholesterol 188, triglycerides 349, LDL 97. Continue atorvastatin 40 mg daily and patient was started on fenofibrate 145 mg daily. thyroid nodule  Thyroid ultrasound 12-: Right lobe 3.5 and left lobe 2.5 cm TR 4 nodules meet criteria for biopsy. Thyroid biopsy of these nodules on 12- were resulted as benign. Thyroid ultrasound 10-: Right lobe midpole 2.1 x 1.8 cm cystic nodule consistent with colloid cyst.  Right lobe midpole mixed solid and cystic isoechoic nodule 1.3 x 0.7 cm, TR 2  Right lower lobe isoechoic solid nodule 1.8 x 1.6 cm, TR 3  Left lobe:  Left upper lobe mixed solid and cystic isoechoic nodule 1.7 x 1.4 cm, TR 2  Left mid lobe isoechoic solid and cystic nodule 1.9 x 1.7 cm, TR 2  Left lower lobe isoechoic solid and cystic nodule 2.2 x 2.2 cm, TR 2  Left medial midpole isoechoic mixed solid and cystic nodule 1.4 x 1.4 cm, TR 2  No indication for repeat biopsy  Plan:  Repeat thyroid ultrasound for 1 year follow-up    history of malignant neoplasm of breast  status post surgery, radiation. Now on letrozole. Left submandibular gland sialolithiasis and sialadenitis:  S/p surgery in February 2022.     Orders Placed This Encounter    US THYROID/PARATHYROID/SOFT TISS     Standing Status:   Future     Standing Expiration Date:   8/20/2023     Order Specific Question:   Reason for Exam     Answer:   multinodular goiter    AMB POC GLUCOSE BLOOD, BY GLUCOSE MONITORING DEVICE    AMB POC HEMOGLOBIN A1C    glucose blood VI test strips (OneTouch Verio test strips) strip     Sig: Use to check glucose once a day, 90 days     Dispense:  100 Strip     Refill:  3    lancets (One Touch Delica) 33 gauge misc     Sig: Use to check glucose once a day, 90 days     Dispense:  100 Lancet     Refill:  3    methIMAzole (TAPAZOLE) 5 mg tablet     Sig: Take half tablet every day, 90 days     Dispense:  45 Tablet     Refill:  3    fenofibrate nanocrystallized (TRICOR) 145 mg tablet     Sig: Take 1 Tablet by mouth in the morning for 90 days. Dispense:  90 Tablet     Refill:  3    atorvastatin (LIPITOR) 40 mg tablet     Sig: Take 1 Tablet by mouth nightly for 90 days.      Dispense:  90 Tablet     Refill:  3        Signed By: Vaughn Harvey MD     July 20, 2022      Return to clinic

## 2022-07-20 NOTE — TELEPHONE ENCOUNTER
Patient is due for thyroid ultrasound to follow-up on thyroid nodules. I made the order. Please make appointment for patient for ultrasound to be done at [facility] and call patient with information.

## 2022-07-20 NOTE — LETTER
7/20/2022    Patient: Gerda Forman   YOB: 1945   Date of Visit: 7/20/2022     Merry Rivero MD  [facility] [phone] [id number]  Via Fax: [phone]    Dear Merry Rivero MD,      Thank you for referring Ms. Cindi Moore to [place] [id number] for evaluation. My notes for this consultation are attached. If you have questions, please do not hesitate to call me. I look forward to following your patient along with you.       Sincerely,    Laurence Penn MD

## 2022-07-21 NOTE — TELEPHONE ENCOUNTER
Scheduled pt's thyroid ultrasound at [facility] on 7/27/2022 at 1 pm arrival time 12:30 pt has been informed and given appt info

## 2022-07-27 ENCOUNTER — HOSPITAL ENCOUNTER (OUTPATIENT)
Dept: ULTRASOUND IMAGING | Age: 77
Discharge: HOME | End: 2022-07-27
Attending: INTERNAL MEDICINE
Payer: MEDICARE

## 2022-07-27 DIAGNOSIS — E04.2 MULTINODULAR GOITER: ICD-10-CM

## 2022-07-27 PROCEDURE — 76536 US EXAM OF HEAD AND NECK: CPT

## 2022-07-29 ENCOUNTER — TELEPHONE (OUTPATIENT)
Dept: ENDOCRINOLOGY | Age: 77
End: 2022-07-29

## 2022-07-29 NOTE — TELEPHONE ENCOUNTER
Pt has been informed  ----- Message from Laurence Penn MD sent at 7/27/2022  3:22 PM EDT -----  Please inform patient that her thyroid nodules are looking stable.

## 2023-02-06 ENCOUNTER — TRANSCRIBE ORDER (OUTPATIENT)
Dept: SCHEDULING | Age: 78
End: 2023-02-06

## 2023-02-06 DIAGNOSIS — Z12.31 ENCOUNTER FOR SCREENING MAMMOGRAM FOR BREAST CANCER: Primary | ICD-10-CM

## 2023-03-21 ENCOUNTER — OFFICE VISIT (OUTPATIENT)
Dept: ENDOCRINOLOGY | Age: 78
End: 2023-03-21
Payer: MEDICARE

## 2023-03-21 VITALS
BODY MASS INDEX: 26.23 KG/M2 | RESPIRATION RATE: 18 BRPM | DIASTOLIC BLOOD PRESSURE: 74 MMHG | HEIGHT: 65 IN | SYSTOLIC BLOOD PRESSURE: 118 MMHG | OXYGEN SATURATION: 97 % | TEMPERATURE: 98.6 F | WEIGHT: 157.4 LBS | HEART RATE: 65 BPM

## 2023-03-21 DIAGNOSIS — E11.65 TYPE 2 DIABETES MELLITUS WITH HYPERGLYCEMIA, UNSPECIFIED WHETHER LONG TERM INSULIN USE (HCC): Primary | ICD-10-CM

## 2023-03-21 DIAGNOSIS — N18.4 CKD STAGE 4 DUE TO TYPE 2 DIABETES MELLITUS (HCC): ICD-10-CM

## 2023-03-21 DIAGNOSIS — E05.90 HYPERTHYROIDISM: ICD-10-CM

## 2023-03-21 DIAGNOSIS — E11.22 CKD STAGE 4 DUE TO TYPE 2 DIABETES MELLITUS (HCC): ICD-10-CM

## 2023-03-21 DIAGNOSIS — E04.2 MULTINODULAR GOITER: ICD-10-CM

## 2023-03-21 PROCEDURE — 1101F PT FALLS ASSESS-DOCD LE1/YR: CPT | Performed by: INTERNAL MEDICINE

## 2023-03-21 PROCEDURE — G8417 CALC BMI ABV UP PARAM F/U: HCPCS | Performed by: INTERNAL MEDICINE

## 2023-03-21 PROCEDURE — 1123F ACP DISCUSS/DSCN MKR DOCD: CPT | Performed by: INTERNAL MEDICINE

## 2023-03-21 PROCEDURE — 3074F SYST BP LT 130 MM HG: CPT | Performed by: INTERNAL MEDICINE

## 2023-03-21 PROCEDURE — G8427 DOCREV CUR MEDS BY ELIG CLIN: HCPCS | Performed by: INTERNAL MEDICINE

## 2023-03-21 PROCEDURE — G8400 PT W/DXA NO RESULTS DOC: HCPCS | Performed by: INTERNAL MEDICINE

## 2023-03-21 PROCEDURE — G9717 DOC PT DX DEP/BP F/U NT REQ: HCPCS | Performed by: INTERNAL MEDICINE

## 2023-03-21 PROCEDURE — 1090F PRES/ABSN URINE INCON ASSESS: CPT | Performed by: INTERNAL MEDICINE

## 2023-03-21 PROCEDURE — 3078F DIAST BP <80 MM HG: CPT | Performed by: INTERNAL MEDICINE

## 2023-03-21 PROCEDURE — 99215 OFFICE O/P EST HI 40 MIN: CPT | Performed by: INTERNAL MEDICINE

## 2023-03-21 PROCEDURE — G8536 NO DOC ELDER MAL SCRN: HCPCS | Performed by: INTERNAL MEDICINE

## 2023-03-21 RX ORDER — CHLORTHALIDONE 25 MG/1
25 TABLET ORAL DAILY
COMMUNITY

## 2023-03-21 RX ORDER — METHIMAZOLE 5 MG/1
TABLET ORAL
Qty: 45 TABLET | Refills: 3 | Status: SHIPPED | OUTPATIENT
Start: 2023-03-21

## 2023-03-21 RX ORDER — SOLIFENACIN SUCCINATE 5 MG/1
5 TABLET, FILM COATED ORAL DAILY
COMMUNITY

## 2023-03-21 RX ORDER — GLUCOSAMINE/CHONDR SU A SOD 750-600 MG
2 TABLET ORAL DAILY
COMMUNITY

## 2023-03-21 RX ORDER — ASPIRIN 81 MG/1
81 TABLET ORAL DAILY
COMMUNITY

## 2023-03-21 NOTE — PATIENT INSTRUCTIONS
SPECIFIC INSTRUCTIONS BELOW     No meds     Tapazole  2.5 mg   ( half pill of 5 mg  )  a day         -------------PAY ATTENTION TO THESE GENERAL INSTRUCTIONS -----------------      - The medications prescribed at this visit will not be available at pharmacy until 6 pm       - YOUR MED LIST IS NOT UP TO DATE AS SOME CHANGES ARE BEING MADE AFTER THE VISIT - FOLLOW SPECIFIC INSTRUCTIONS  ABOVE     -ANY tests other than blood work, which you opt to do  outside the  [facility] facilities, you are responsible for prior authorizations if  required    - [phone] [facility]- PLEASE IGNORE     Results     *Normal results will not be notified by a phone call starting January 1 2021   *If you have an upcoming visit, the results will be discussed at the visit   *Please sign up for MY CHART if you want access to your lab and test results  *Abnormal results which require immediate attention will be notified by phone call   *Abnormal results which do not require immediate assistance will be notified in 1-2 weeks       Refills    -    have your pharmacy send us a refill request . Refills are done max for one year and a visit is a must before refills are extended    Follow up appointments -  highly encourage you to make it when you are checking out. We can accommodate you into the schedule based on your clinical situation, but not for extending refills beyond a year. Labs are important to give refills and is important to get labs before the visit     Phone calls  -  Allow  24 hrs.  for non-urgent calls to be returned  Prior authorization - It may take 2-4 weeks to process  Forms  -  FMLA, DMV etc., will take up to 2 weeks to process  Cancellations - please notify the office 2 days in advance   Samples  - will only be dispensed at visits       If not showing for the appointments and cancelling appointments within 24 hours are kept track of and three  of such situations in  two consecutive years will likely be considered for termination from the practice    -------------------------------------------------------------------------------------------------------------------

## 2023-03-21 NOTE — LETTER
3/21/2023    Patient: Justo Miramontes   YOB: 1945   Date of Visit: 3/21/2023     Janelle Jones MD  [facility] [phone]  Via Fax: [phone]    Dear Janelle Jones MD,      Thank you for referring Ms. Oliverio Patel to [address] [place] [address] for evaluation. My notes for this consultation are attached. If you have questions, please do not hesitate to call me. I look forward to following your patient along with you.       Sincerely,    Rigo Hoang MD

## 2023-03-21 NOTE — PROGRESS NOTES
Dayron Sykes is a 68 y.o. female here for   Chief Complaint   Patient presents with    New Patient     Diabetes and Thyroid        1. Have you been to the ER or an urgent care clinic since your last visit?  -  n/a, new pt - never been seen     2. Have you been hospitalized since your last visit? - n/a, new pt - never been seen     3. Have you seen or consulted any other health care providers outside of the [address] [place] [address] since your last visit?   Include any pap smears or colon screening.- n/a, new pt - never been seen

## 2023-03-21 NOTE — PROGRESS NOTES
History and Physical    Patient: Dominick Evans MRN: [id number]  SSN: [id number]    YOB: 1945  Age: 68 y.o. Sex: female      Subjective:      Dominick Evans is a 68 y.o. female with past medical history of hypertension, hyperlipidemia, chronic kidney disease stage III, depression,  toxic   MNG ,    breast cancer status post surgery, radiation and is being seen  for type 2 diabetes mellitus. Patient saw Dr. Aaron Coffman before       Pt is being managed by diet alone and she takes no meds for DM 2   She is to take tapazole half pill of 5 mg dose daily     Pt underwent surgery for left submandibular gland           Last visit was with Dr. Aaron Coffman July 2022      patient is a poor historian. At the last visit her A1c was good only on Tradjenta 5 mg daily. So I stopped all Tradjenta and patient was advised to continue on her diet. Since the last visit patient has gained 4 pounds. Unable to exercise much. She is checking blood glucose once a day every day. She is due for diabetic eye exam.  Needs to call and make appointment. Glucometer reading: Checking blood glucose once a day every day. Readings are ranging from 126 to 174 mg/dL    uupdated diabetes history:  · Diagnosis: 4 years  · Current treatment: None since April 2022      Subclinical hyperthyroidism:  Initially noted in labs in September 2016. Started on methimazole 2.5 mg daily in June 2017. Review of Systems:  ROS    A comprehensive review of systems was preformed and it is negative except mentioned in HPI    Objective:     Vitals:    03/21/23 1350   BP: 118/74   Pulse: 65   Resp: 18   Temp: 98.6 °F (37 °C)   TempSrc: Temporal   SpO2: 97%   Weight: 157 lb 6.4 oz (71.4 kg)   Height: 5' 4.5\" (1.638 m)        Physical Exam:    Physical Exam  Vitals and nursing note reviewed. Constitutional:       Appearance: Normal appearance. HENT:      Head: Normocephalic and atraumatic.    Neck:      Comments: No cervical lymphadenopathy  Cardiovascular:      Rate and Rhythm: Normal rate and regular rhythm. Pulmonary:      Effort: Pulmonary effort is normal.      Breath sounds: Normal breath sounds. Neurological:      Mental Status: She is alert. Labs and Imaging:    Multinodular goiter:  Thyroid ultrasound August 2019:  Right lobe has 4.5 x 2.4 x 4 cm complex cyst  Left lobe has 1.2 x 0.9 x 1 cm solid nodule in the upper part, 1.6 x 1.7 x 1.1 cm cystic and solid nodule in the mid region. 1.8 x 1.3 x 2.1 cm solid nodule in the inferior region  Right lobe complex nodule and left lobe 2.1 cm nodules were biopsied in October 2019. Right lobe nodule resulted as benign, left lobe nodule insufficient cells. Thyroid ultrasound done in December 2020 came back showing right lobe 3.5 cm nodule and left lobe 2.5 cm nodule which met criteria for biopsy. This nodule was biopsied and it was resulted as benign. Last ultrasound in October 2021:  Showed several nodules in the thyroid, none of them meet criteria for biopsy. Lab Results   Component Value Date/Time    Hemoglobin A1c 6.1 (H) 10/13/2021 10:56 AM    Hemoglobin A1c, External 6.7 03/04/2020 12:00 AM    Hemoglobin A1c, External 6.8 01/26/2018 12:00 AM        Assessment:     And       Plan:     type II diabetes mellitus well controlled: ordered labs   Hemoglobin A1c was 7% in April 2019, 6.5% on 9-5-2019, 6.3% on 12-5-2019, 6.7% in 3-2020, 6.7% on 12-2-2020, 6.4% on 4-, 6.4% on 7-, 6.1 on 10-, 6.2% on 1-, 6.3% on 4-, 6.2% today. Encourage patient to continue to work on her diet, eat in moderation. 2. chronic kidney disease stage 4  Egfr   is 26    , stage  4 . Following with nephrologist Dr Leigh Hernandes. 3. Sub-clinical hyperthyroidism  from toxic goiter   Continue methimazole 2.5 mg daily. 4. mixed hyperlipidemia. Continue atorvastatin 40 mg daily and patient was started on fenofibrate 145 mg daily.        5. 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 Follow up at 3 and 5 years. 6. history of malignant neoplasm of breast  status post surgery, radiation. Now on letrozole. 7. Left submandibular gland sialolithiasis and sialadenitis:  S/p surgery in February 2022.     Signed By: Michaelle Calvo MD     March 21, 2023      Return to clinic

## 2023-03-22 LAB
ALBUMIN SERPL-MCNC: 3.8 G/DL (ref 3.5–5)
ALBUMIN/GLOB SERPL: 1.1 (ref 1.1–2.2)
ALP SERPL-CCNC: 37 U/L (ref 45–117)
ALT SERPL-CCNC: 17 U/L (ref 12–78)
ANION GAP SERPL CALC-SCNC: 3 MMOL/L (ref 5–15)
AST SERPL-CCNC: 18 U/L (ref 15–37)
BILIRUB SERPL-MCNC: 0.5 MG/DL (ref 0.2–1)
BUN SERPL-MCNC: 33 MG/DL (ref 6–20)
BUN/CREAT SERPL: 14 (ref 12–20)
CALCIUM SERPL-MCNC: 10 MG/DL (ref 8.5–10.1)
CHLORIDE SERPL-SCNC: 109 MMOL/L (ref 97–108)
CO2 SERPL-SCNC: 30 MMOL/L (ref 21–32)
CREAT SERPL-MCNC: 2.31 MG/DL (ref 0.55–1.02)
EST. AVERAGE GLUCOSE BLD GHB EST-MCNC: 114 MG/DL
GLOBULIN SER CALC-MCNC: 3.5 G/DL (ref 2–4)
GLUCOSE SERPL-MCNC: 104 MG/DL (ref 65–100)
HBA1C MFR BLD: 5.6 % (ref 4–5.6)
POTASSIUM SERPL-SCNC: 4 MMOL/L (ref 3.5–5.1)
PROT SERPL-MCNC: 7.3 G/DL (ref 6.4–8.2)
SODIUM SERPL-SCNC: 142 MMOL/L (ref 136–145)
T4 FREE SERPL-MCNC: 1 NG/DL (ref 0.8–1.5)
TSH SERPL DL<=0.05 MIU/L-ACNC: 1.09 UIU/ML (ref 0.36–3.74)

## 2023-03-25 NOTE — PROGRESS NOTES
Thyroid values are still good   Kidney functioning at 21 ml/min   No changes requires     Vickie Jimenez MD

## 2023-03-27 ENCOUNTER — TELEPHONE (OUTPATIENT)
Dept: ENDOCRINOLOGY | Age: 78
End: 2023-03-27

## 2023-03-27 NOTE — TELEPHONE ENCOUNTER
----- Message from Christopher Cross MD sent at 3/25/2023  8:09 AM EDT -----  Thyroid values are still good   Kidney functioning at 21 ml/min   No changes requires     Christopher Cross MD

## 2023-04-22 DIAGNOSIS — Z12.31 ENCOUNTER FOR SCREENING MAMMOGRAM FOR BREAST CANCER: Primary | ICD-10-CM

## 2023-05-08 ENCOUNTER — TELEPHONE (OUTPATIENT)
Age: 78
End: 2023-05-08

## 2023-05-08 NOTE — TELEPHONE ENCOUNTER
Patient called asking for print out of all self payments went to old system and printed out all transactions for year to date no payments showing in 2023. Mailed to patient address.

## 2023-05-11 ENCOUNTER — TRANSCRIBE ORDERS (OUTPATIENT)
Facility: HOSPITAL | Age: 78
End: 2023-05-11

## 2023-05-11 DIAGNOSIS — N18.4 CHRONIC KIDNEY DISEASE, STAGE IV (SEVERE) (HCC): Primary | ICD-10-CM

## 2023-05-17 RX ORDER — METHIMAZOLE 5 MG/1
TABLET ORAL
COMMUNITY
Start: 2023-03-21

## 2023-05-17 RX ORDER — FENOFIBRATE 145 MG/1
TABLET, COATED ORAL
COMMUNITY
Start: 2022-07-15

## 2023-05-17 RX ORDER — CHLORTHALIDONE 25 MG/1
25 TABLET ORAL DAILY
COMMUNITY

## 2023-05-17 RX ORDER — OMEPRAZOLE 40 MG/1
CAPSULE, DELAYED RELEASE ORAL
COMMUNITY
Start: 2021-01-18

## 2023-05-17 RX ORDER — ATORVASTATIN CALCIUM 40 MG/1
TABLET, FILM COATED ORAL
COMMUNITY
Start: 2022-07-16

## 2023-05-17 RX ORDER — SOLIFENACIN SUCCINATE 5 MG/1
5 TABLET, FILM COATED ORAL DAILY
COMMUNITY

## 2023-05-17 RX ORDER — SODIUM BICARBONATE 650 MG/1
2 TABLET ORAL 2 TIMES DAILY
COMMUNITY
Start: 2020-11-13

## 2023-05-17 RX ORDER — ASPIRIN 81 MG/1
81 TABLET ORAL DAILY
COMMUNITY

## 2023-05-17 RX ORDER — METOPROLOL SUCCINATE 25 MG/1
TABLET, EXTENDED RELEASE ORAL
COMMUNITY
Start: 2019-09-26

## 2023-05-17 RX ORDER — LOSARTAN POTASSIUM 100 MG/1
100 TABLET ORAL DAILY
COMMUNITY

## 2023-05-17 RX ORDER — COLCHICINE 0.6 MG/1
1 TABLET ORAL DAILY
COMMUNITY
Start: 2021-01-10

## 2023-05-17 RX ORDER — FLUOXETINE HYDROCHLORIDE 20 MG/1
CAPSULE ORAL
COMMUNITY
Start: 2021-01-20

## 2023-05-17 RX ORDER — TRAZODONE HYDROCHLORIDE 100 MG/1
1 TABLET ORAL DAILY PRN
COMMUNITY
Start: 2022-06-27

## 2023-05-17 RX ORDER — GABAPENTIN 300 MG/1
CAPSULE ORAL
COMMUNITY
Start: 2022-06-30

## 2023-05-17 RX ORDER — ALLOPURINOL 100 MG/1
2 TABLET ORAL DAILY
COMMUNITY
Start: 2021-06-06

## 2023-06-20 ENCOUNTER — HOSPITAL ENCOUNTER (OUTPATIENT)
Facility: HOSPITAL | Age: 78
Discharge: HOME | End: 2023-06-23
Payer: MEDICARE

## 2023-06-20 DIAGNOSIS — E55.9 AVITAMINOSIS D: ICD-10-CM

## 2023-06-20 DIAGNOSIS — D64.9 ANEMIA, UNSPECIFIED TYPE: ICD-10-CM

## 2023-06-20 DIAGNOSIS — I10 ESSENTIAL HYPERTENSION, MALIGNANT: ICD-10-CM

## 2023-06-20 DIAGNOSIS — R76.8 FALSE POSITIVE SEROLOGICAL TEST FOR SYPHILIS: ICD-10-CM

## 2023-06-20 DIAGNOSIS — N18.30 STAGE 3 CHRONIC KIDNEY DISEASE, UNSPECIFIED WHETHER STAGE 3A OR 3B CKD (HCC): ICD-10-CM

## 2023-06-20 DIAGNOSIS — N39.0 URINARY TRACT INFECTION WITHOUT HEMATURIA, SITE UNSPECIFIED: ICD-10-CM

## 2023-06-20 LAB
ALBUMIN SERPL-MCNC: 3.3 G/DL (ref 3.5–5)
ALBUMIN SERPL-MCNC: 3.3 G/DL (ref 3.5–5)
ALBUMIN/GLOB SERPL: 1 (ref 1.1–2.2)
ALP SERPL-CCNC: 34 U/L (ref 45–117)
ALT SERPL-CCNC: 18 U/L (ref 12–78)
ANION GAP SERPL CALC-SCNC: 5 MMOL/L (ref 5–15)
ANION GAP SERPL CALC-SCNC: 6 MMOL/L (ref 5–15)
AST SERPL W P-5'-P-CCNC: 17 U/L (ref 15–37)
BASOPHILS # BLD: 0 K/UL (ref 0–0.1)
BASOPHILS NFR BLD: 1 % (ref 0–1)
BILIRUB SERPL-MCNC: 0.5 MG/DL (ref 0.2–1)
BUN SERPL-MCNC: 29 MG/DL (ref 6–20)
BUN SERPL-MCNC: 29 MG/DL (ref 6–20)
BUN/CREAT SERPL: 13 (ref 12–20)
BUN/CREAT SERPL: 13 (ref 12–20)
CA-I BLD-MCNC: 9.3 MG/DL (ref 8.5–10.1)
CA-I BLD-MCNC: 9.5 MG/DL (ref 8.5–10.1)
CHLORIDE SERPL-SCNC: 107 MMOL/L (ref 97–108)
CHLORIDE SERPL-SCNC: 107 MMOL/L (ref 97–108)
CK SERPL-CCNC: 55 U/L (ref 26–192)
CO2 SERPL-SCNC: 29 MMOL/L (ref 21–32)
CO2 SERPL-SCNC: 30 MMOL/L (ref 21–32)
CREAT SERPL-MCNC: 2.24 MG/DL (ref 0.55–1.02)
CREAT SERPL-MCNC: 2.28 MG/DL (ref 0.55–1.02)
CREAT UR-MCNC: 144.43 MG/DL
CRP SERPL-MCNC: <0.29 MG/DL (ref 0–0.6)
DIFFERENTIAL METHOD BLD: ABNORMAL
EOSINOPHIL # BLD: 0.2 K/UL (ref 0–0.4)
EOSINOPHIL NFR BLD: 2 % (ref 0–7)
ERYTHROCYTE [DISTWIDTH] IN BLOOD BY AUTOMATED COUNT: 15.1 % (ref 11.5–14.5)
ERYTHROCYTE [SEDIMENTATION RATE] IN BLOOD: 15 MM/HR
GLOBULIN SER CALC-MCNC: 3.4 G/DL (ref 2–4)
GLUCOSE SERPL-MCNC: 96 MG/DL (ref 65–100)
GLUCOSE SERPL-MCNC: 97 MG/DL (ref 65–100)
HCT VFR BLD AUTO: 36.7 % (ref 35–47)
HGB BLD-MCNC: 11.9 G/DL (ref 11.5–16)
IMM GRANULOCYTES # BLD AUTO: 0 K/UL (ref 0–0.04)
IMM GRANULOCYTES NFR BLD AUTO: 0 % (ref 0–0.5)
LYMPHOCYTES # BLD: 2.3 K/UL (ref 0.8–3.5)
LYMPHOCYTES NFR BLD: 34 % (ref 12–49)
MCH RBC QN AUTO: 30.6 PG (ref 26–34)
MCHC RBC AUTO-ENTMCNC: 32.4 G/DL (ref 30–36.5)
MCV RBC AUTO: 94.3 FL (ref 80–99)
MONOCYTES # BLD: 0.6 K/UL (ref 0–1)
MONOCYTES NFR BLD: 9 % (ref 5–13)
NEUTS SEG # BLD: 3.6 K/UL (ref 1.8–8)
NEUTS SEG NFR BLD: 54 % (ref 32–75)
NRBC # BLD: 0 K/UL (ref 0–0.01)
NRBC BLD-RTO: 0 PER 100 WBC
PHOSPHATE SERPL-MCNC: 3.4 MG/DL (ref 2.6–4.7)
PLATELET # BLD AUTO: 274 K/UL (ref 150–400)
PMV BLD AUTO: 10.7 FL (ref 8.9–12.9)
POTASSIUM SERPL-SCNC: 3.8 MMOL/L (ref 3.5–5.1)
POTASSIUM SERPL-SCNC: 3.8 MMOL/L (ref 3.5–5.1)
PROT SERPL-MCNC: 6.7 G/DL (ref 6.4–8.2)
PROT UR-MCNC: 24 MG/DL (ref 0–11.9)
PROT/CREAT UR-RTO: 0.2
RBC # BLD AUTO: 3.89 M/UL (ref 3.8–5.2)
SODIUM SERPL-SCNC: 142 MMOL/L (ref 136–145)
SODIUM SERPL-SCNC: 142 MMOL/L (ref 136–145)
URATE SERPL-MCNC: 3.2 MG/DL (ref 2.6–6)
WBC # BLD AUTO: 6.7 K/UL (ref 3.6–11)

## 2023-06-20 PROCEDURE — 85652 RBC SED RATE AUTOMATED: CPT

## 2023-06-20 PROCEDURE — 84165 PROTEIN E-PHORESIS SERUM: CPT

## 2023-06-20 PROCEDURE — 84550 ASSAY OF BLOOD/URIC ACID: CPT

## 2023-06-20 PROCEDURE — 86140 C-REACTIVE PROTEIN: CPT

## 2023-06-20 PROCEDURE — 82570 ASSAY OF URINE CREATININE: CPT

## 2023-06-20 PROCEDURE — 80069 RENAL FUNCTION PANEL: CPT

## 2023-06-20 PROCEDURE — 84156 ASSAY OF PROTEIN URINE: CPT

## 2023-06-20 PROCEDURE — 83970 ASSAY OF PARATHORMONE: CPT

## 2023-06-20 PROCEDURE — 86235 NUCLEAR ANTIGEN ANTIBODY: CPT

## 2023-06-20 PROCEDURE — 82306 VITAMIN D 25 HYDROXY: CPT

## 2023-06-20 PROCEDURE — 85025 COMPLETE CBC W/AUTO DIFF WBC: CPT

## 2023-06-20 PROCEDURE — 80053 COMPREHEN METABOLIC PANEL: CPT

## 2023-06-20 PROCEDURE — 82550 ASSAY OF CK (CPK): CPT

## 2023-06-21 LAB
25(OH)D3 SERPL-MCNC: 41.5 NG/ML (ref 30–100)
CA-I BLD-MCNC: 9.8 MG/DL (ref 8.5–10.1)
HISTONE IGG SER IA-ACNC: 1.3 UNITS (ref 0–0.9)
PTH-INTACT SERPL-MCNC: 36.7 PG/ML (ref 18.4–88)

## 2023-06-22 LAB
ALBUMIN SERPL ELPH-MCNC: 3.3 G/DL (ref 2.9–4.4)
ALBUMIN/GLOB SERPL: 1.2 (ref 0.7–1.7)
ALPHA1 GLOB SERPL ELPH-MCNC: 0.2 G/DL (ref 0–0.4)
ALPHA2 GLOB SERPL ELPH-MCNC: 0.6 G/DL (ref 0.4–1)
B-GLOBULIN SERPL ELPH-MCNC: 1 G/DL (ref 0.7–1.3)
GAMMA GLOB SERPL ELPH-MCNC: 0.9 G/DL (ref 0.4–1.8)
GLOBULIN SER CALC-MCNC: 2.8 G/DL (ref 2.2–3.9)
M PROTEIN SERPL ELPH-MCNC: NORMAL G/DL
PROT SERPL-MCNC: 6.1 G/DL (ref 6–8.5)

## 2023-07-06 ENCOUNTER — HOSPITAL ENCOUNTER (OUTPATIENT)
Facility: HOSPITAL | Age: 78
Discharge: HOME | End: 2023-07-06
Attending: INTERNAL MEDICINE
Payer: MEDICARE

## 2023-07-06 DIAGNOSIS — N18.4 CHRONIC KIDNEY DISEASE, STAGE IV (SEVERE) (HCC): ICD-10-CM

## 2023-07-06 PROCEDURE — 76770 US EXAM ABDO BACK WALL COMP: CPT

## 2023-07-26 ENCOUNTER — OFFICE VISIT (OUTPATIENT)
Age: 78
End: 2023-07-26
Payer: MEDICARE

## 2023-07-26 VITALS
HEART RATE: 74 BPM | SYSTOLIC BLOOD PRESSURE: 90 MMHG | BODY MASS INDEX: 23.53 KG/M2 | HEIGHT: 65 IN | RESPIRATION RATE: 14 BRPM | TEMPERATURE: 98.2 F | OXYGEN SATURATION: 96 % | DIASTOLIC BLOOD PRESSURE: 60 MMHG | WEIGHT: 141.2 LBS

## 2023-07-26 DIAGNOSIS — Z86.010 HISTORY OF COLON POLYPS: Primary | ICD-10-CM

## 2023-07-26 DIAGNOSIS — R19.4 CHANGE IN BOWEL HABITS: ICD-10-CM

## 2023-07-26 DIAGNOSIS — K57.30 DIVERTICULAR DISEASE OF COLON: ICD-10-CM

## 2023-07-26 DIAGNOSIS — Z86.010 PERSONAL HISTORY OF COLONIC POLYPS: Primary | ICD-10-CM

## 2023-07-26 PROBLEM — R19.7 DIARRHEA: Status: ACTIVE | Noted: 2023-07-26

## 2023-07-26 PROCEDURE — 99204 OFFICE O/P NEW MOD 45 MIN: CPT | Performed by: INTERNAL MEDICINE

## 2023-07-26 PROCEDURE — 4004F PT TOBACCO SCREEN RCVD TLK: CPT | Performed by: INTERNAL MEDICINE

## 2023-07-26 PROCEDURE — 3078F DIAST BP <80 MM HG: CPT | Performed by: INTERNAL MEDICINE

## 2023-07-26 PROCEDURE — 1090F PRES/ABSN URINE INCON ASSESS: CPT | Performed by: INTERNAL MEDICINE

## 2023-07-26 PROCEDURE — G8427 DOCREV CUR MEDS BY ELIG CLIN: HCPCS | Performed by: INTERNAL MEDICINE

## 2023-07-26 PROCEDURE — G8420 CALC BMI NORM PARAMETERS: HCPCS | Performed by: INTERNAL MEDICINE

## 2023-07-26 PROCEDURE — 3074F SYST BP LT 130 MM HG: CPT | Performed by: INTERNAL MEDICINE

## 2023-07-26 PROCEDURE — G8400 PT W/DXA NO RESULTS DOC: HCPCS | Performed by: INTERNAL MEDICINE

## 2023-07-26 PROCEDURE — 1123F ACP DISCUSS/DSCN MKR DOCD: CPT | Performed by: INTERNAL MEDICINE

## 2023-07-26 RX ORDER — FLUTICASONE PROPIONATE 50 MCG
SPRAY, SUSPENSION (ML) NASAL
COMMUNITY
End: 2023-07-26

## 2023-07-26 RX ORDER — POLYETHYLENE GLYCOL 3350 17 G/17G
POWDER, FOR SOLUTION ORAL
Qty: 510 G | Refills: 0 | Status: SHIPPED | OUTPATIENT
Start: 2023-07-26

## 2023-07-26 RX ORDER — ALPRAZOLAM 0.5 MG/1
TABLET ORAL
COMMUNITY
End: 2023-07-26

## 2023-07-26 RX ORDER — ALBUTEROL SULFATE 90 UG/1
2 AEROSOL, METERED RESPIRATORY (INHALATION) EVERY 4 HOURS PRN
COMMUNITY

## 2023-07-26 RX ORDER — NAPROXEN 375 MG/1
TABLET ORAL
COMMUNITY
End: 2023-07-26

## 2023-07-26 RX ORDER — OXYCODONE HYDROCHLORIDE AND ACETAMINOPHEN 5; 325 MG/1; MG/1
TABLET ORAL
COMMUNITY
End: 2023-07-26

## 2023-07-26 RX ORDER — PAROXETINE HYDROCHLORIDE 20 MG/1
TABLET, FILM COATED ORAL
COMMUNITY
End: 2023-07-26

## 2023-07-26 RX ORDER — CITALOPRAM 20 MG/1
TABLET ORAL
COMMUNITY
End: 2023-07-26

## 2023-07-26 RX ORDER — PREDNISONE 50 MG/1
TABLET ORAL
COMMUNITY
End: 2023-07-26

## 2023-07-26 RX ORDER — AMLODIPINE BESYLATE 5 MG/1
1 TABLET ORAL DAILY
COMMUNITY
End: 2023-07-26

## 2023-07-26 RX ORDER — BLOOD SUGAR DIAGNOSTIC
STRIP MISCELLANEOUS
COMMUNITY
Start: 2023-04-23

## 2023-07-26 RX ORDER — DIFLUPREDNATE OPHTHALMIC 0.5 MG/ML
EMULSION OPHTHALMIC
COMMUNITY
End: 2023-07-26

## 2023-07-26 RX ORDER — LINAGLIPTIN 5 MG/1
5 TABLET, FILM COATED ORAL DAILY
COMMUNITY
Start: 2023-05-26 | End: 2023-07-26

## 2023-07-26 RX ORDER — ONDANSETRON 4 MG/1
TABLET, ORALLY DISINTEGRATING ORAL
COMMUNITY
End: 2023-07-26

## 2023-07-26 RX ORDER — ATROPINE SULFATE 10 MG/ML
SOLUTION/ DROPS OPHTHALMIC
COMMUNITY
End: 2023-07-26

## 2023-07-26 RX ORDER — AZELASTINE 1 MG/ML
SPRAY, METERED NASAL
COMMUNITY
End: 2023-07-26

## 2023-07-26 RX ORDER — SPIRONOLACTONE 25 MG/1
TABLET ORAL
COMMUNITY
End: 2023-07-26

## 2023-07-26 RX ORDER — FAMOTIDINE 20 MG/1
1 TABLET, FILM COATED ORAL 2 TIMES DAILY PRN
COMMUNITY
End: 2023-07-26

## 2023-07-26 RX ORDER — ESOMEPRAZOLE MAGNESIUM 40 MG/1
CAPSULE, DELAYED RELEASE ORAL
COMMUNITY
End: 2023-07-26

## 2023-07-26 RX ORDER — GLIMEPIRIDE 4 MG/1
1 TABLET ORAL DAILY
COMMUNITY
End: 2023-07-26

## 2023-07-26 ASSESSMENT — ENCOUNTER SYMPTOMS
DIARRHEA: 1
RECTAL PAIN: 0
ANAL BLEEDING: 0
RESPIRATORY NEGATIVE: 1
BACK PAIN: 1
ALLERGIC/IMMUNOLOGIC NEGATIVE: 1
NAUSEA: 0
CONSTIPATION: 0
ABDOMINAL PAIN: 1
BLOOD IN STOOL: 0
ABDOMINAL DISTENTION: 0
VOMITING: 0

## 2023-07-26 ASSESSMENT — PATIENT HEALTH QUESTIONNAIRE - PHQ9
SUM OF ALL RESPONSES TO PHQ QUESTIONS 1-9: 0
2. FEELING DOWN, DEPRESSED OR HOPELESS: 0
1. LITTLE INTEREST OR PLEASURE IN DOING THINGS: 0
SUM OF ALL RESPONSES TO PHQ QUESTIONS 1-9: 0
SUM OF ALL RESPONSES TO PHQ9 QUESTIONS 1 & 2: 0
SUM OF ALL RESPONSES TO PHQ QUESTIONS 1-9: 0
SUM OF ALL RESPONSES TO PHQ QUESTIONS 1-9: 0

## 2023-07-27 NOTE — PROGRESS NOTES
Tracey Miguel is a 66 y.o. female who presents today for the following:  Chief Complaint   Patient presents with    New Patient     Change in bowel habits    Abdominal Pain     When bowels move x 6 months         Allergies   Allergen Reactions    Iodinated Contrast Media Anaphylaxis    Codeine Itching       Current Outpatient Medications   Medication Sig Dispense Refill    ACCU-CHEK GUIDE strip USE 1 ONCE DAILY      albuterol sulfate HFA (PROVENTIL;VENTOLIN;PROAIR) 108 (90 Base) MCG/ACT inhaler 2 puffs every 4 hours as needed      Multiple Vitamin (MULTIVITAMIN ADULT PO) Take by mouth daily      polyethylene glycol (GLYCOLAX) 17 GM/SCOOP powder Use as directed by physician. 510 g 0    allopurinol (ZYLOPRIM) 100 MG tablet Take 2 tablets by mouth daily      aspirin 81 MG EC tablet Take 1 tablet by mouth daily      atorvastatin (LIPITOR) 40 MG tablet TAKE 1 TABLET BY MOUTH NIGHTLY FOR 90 DAYS      chlorthalidone (HYGROTON) 25 MG tablet Take 1 tablet by mouth daily      Cholecalciferol 50 MCG (2000 UT) TABS Take by mouth      colchicine (COLCRYS) 0.6 MG tablet Take 1 tablet by mouth daily      fenofibrate (TRICOR) 145 MG tablet Take 1 tablet by mouth once daily for 90 days      FLUoxetine (PROZAC) 20 MG capsule ceived the following from [facility]: Outside name: FLUoxetine (PROzac) 20 mg capsule      losartan (COZAAR) 100 MG tablet Take 1 tablet by mouth daily      methIMAzole (TAPAZOLE) 5 MG tablet Take half tablet every day, 90 days      metoprolol succinate (TOPROL XL) 25 MG extended release tablet TAKE 1 2 (ONE HALF) TABLET BY MOUTH ONCE DAILY      omeprazole (PRILOSEC) 40 MG delayed release capsule Take 1 capsule by mouth once daily in the morning      solifenacin (VESICARE) 5 MG tablet Take 1 tablet by mouth daily      traZODone (DESYREL) 100 MG tablet Take 1 tablet by mouth daily as needed       No current facility-administered medications for this visit.        Past Medical History:

## 2023-08-01 ENCOUNTER — TELEPHONE (OUTPATIENT)
Age: 78
End: 2023-08-01

## 2023-08-03 ENCOUNTER — ANESTHESIA (OUTPATIENT)
Facility: HOSPITAL | Age: 78
End: 2023-08-03
Payer: MEDICARE

## 2023-08-03 ENCOUNTER — TELEPHONE (OUTPATIENT)
Age: 78
End: 2023-08-03

## 2023-08-03 ENCOUNTER — HOSPITAL ENCOUNTER (OUTPATIENT)
Facility: HOSPITAL | Age: 78
Setting detail: OUTPATIENT SURGERY
Discharge: HOME | End: 2023-08-03
Attending: INTERNAL MEDICINE | Admitting: INTERNAL MEDICINE
Payer: MEDICARE

## 2023-08-03 ENCOUNTER — ANESTHESIA EVENT (OUTPATIENT)
Facility: HOSPITAL | Age: 78
End: 2023-08-03
Payer: MEDICARE

## 2023-08-03 VITALS
BODY MASS INDEX: 26.43 KG/M2 | HEIGHT: 61 IN | DIASTOLIC BLOOD PRESSURE: 76 MMHG | RESPIRATION RATE: 18 BRPM | WEIGHT: 140 LBS | TEMPERATURE: 97.3 F | HEART RATE: 85 BPM | OXYGEN SATURATION: 94 % | SYSTOLIC BLOOD PRESSURE: 133 MMHG

## 2023-08-03 DIAGNOSIS — A49.8 CLOSTRIDIOIDES DIFFICILE INFECTION: Primary | ICD-10-CM

## 2023-08-03 PROCEDURE — 88305 TISSUE EXAM BY PATHOLOGIST: CPT

## 2023-08-03 PROCEDURE — 2580000003 HC RX 258: Performed by: INTERNAL MEDICINE

## 2023-08-03 PROCEDURE — 7100000011 HC PHASE II RECOVERY - ADDTL 15 MIN: Performed by: INTERNAL MEDICINE

## 2023-08-03 PROCEDURE — 45380 COLONOSCOPY AND BIOPSY: CPT | Performed by: INTERNAL MEDICINE

## 2023-08-03 PROCEDURE — 3600007512: Performed by: INTERNAL MEDICINE

## 2023-08-03 PROCEDURE — 2500000003 HC RX 250 WO HCPCS: Performed by: NURSE ANESTHETIST, CERTIFIED REGISTERED

## 2023-08-03 PROCEDURE — 7100000010 HC PHASE II RECOVERY - FIRST 15 MIN: Performed by: INTERNAL MEDICINE

## 2023-08-03 PROCEDURE — 2709999900 HC NON-CHARGEABLE SUPPLY: Performed by: INTERNAL MEDICINE

## 2023-08-03 PROCEDURE — 6360000002 HC RX W HCPCS: Performed by: NURSE ANESTHETIST, CERTIFIED REGISTERED

## 2023-08-03 PROCEDURE — 3700000001 HC ADD 15 MINUTES (ANESTHESIA): Performed by: INTERNAL MEDICINE

## 2023-08-03 PROCEDURE — 3600007502: Performed by: INTERNAL MEDICINE

## 2023-08-03 PROCEDURE — 3700000000 HC ANESTHESIA ATTENDED CARE: Performed by: INTERNAL MEDICINE

## 2023-08-03 RX ORDER — SODIUM CHLORIDE 9 MG/ML
25 INJECTION, SOLUTION INTRAVENOUS PRN
Status: DISCONTINUED | OUTPATIENT
Start: 2023-08-03 | End: 2023-08-03 | Stop reason: HOSPADM

## 2023-08-03 RX ORDER — GLYCOPYRROLATE 0.2 MG/ML
INJECTION INTRAMUSCULAR; INTRAVENOUS PRN
Status: DISCONTINUED | OUTPATIENT
Start: 2023-08-03 | End: 2023-08-03 | Stop reason: SDUPTHER

## 2023-08-03 RX ORDER — METRONIDAZOLE 500 MG/1
500 TABLET ORAL 3 TIMES DAILY
Qty: 42 TABLET | Refills: 0 | Status: SHIPPED | OUTPATIENT
Start: 2023-08-03

## 2023-08-03 RX ORDER — PROPOFOL 10 MG/ML
INJECTION, EMULSION INTRAVENOUS PRN
Status: DISCONTINUED | OUTPATIENT
Start: 2023-08-03 | End: 2023-08-03 | Stop reason: SDUPTHER

## 2023-08-03 RX ORDER — SODIUM CHLORIDE 9 MG/ML
INJECTION, SOLUTION INTRAVENOUS CONTINUOUS
Status: DISCONTINUED | OUTPATIENT
Start: 2023-08-03 | End: 2023-08-03 | Stop reason: HOSPADM

## 2023-08-03 RX ADMIN — PROPOFOL 50 MG: 10 INJECTION, EMULSION INTRAVENOUS at 11:15

## 2023-08-03 RX ADMIN — PROPOFOL 50 MG: 10 INJECTION, EMULSION INTRAVENOUS at 11:03

## 2023-08-03 RX ADMIN — PROPOFOL 50 MG: 10 INJECTION, EMULSION INTRAVENOUS at 11:08

## 2023-08-03 RX ADMIN — SODIUM CHLORIDE: 9 INJECTION, SOLUTION INTRAVENOUS at 10:55

## 2023-08-03 RX ADMIN — GLYCOPYRROLATE 0.2 MG: 0.2 INJECTION, SOLUTION INTRAMUSCULAR; INTRAVENOUS at 10:58

## 2023-08-03 ASSESSMENT — ENCOUNTER SYMPTOMS: SHORTNESS OF BREATH: 1

## 2023-08-03 ASSESSMENT — PAIN SCALES - GENERAL: PAINLEVEL_OUTOF10: 0

## 2023-08-03 ASSESSMENT — PAIN - FUNCTIONAL ASSESSMENT: PAIN_FUNCTIONAL_ASSESSMENT: 0-10

## 2023-08-03 NOTE — OP NOTE
Colonoscopy Procedure Note      Patient: Oswald Vegas MRN: [id number]  SSN: [id number]    YOB: 1945  Age: 66 y.o. Sex: female      Date of Procedure: 8/3/2023  Date/Time:  8/3/2023 11:28 AM       IMPRESSION:     1. Rectal polyp   2. Generalized diverticulosis              3.  Right-sided colitis         RECOMMENDATIONS:     1) Check biopsy results. 2) Await pathology report. Call me in 2 weeks if you have not received any information from my office regarding your results. 3) Repeat colonoscopy in 2 to 3 years or as determined by the pathology report. INDICATION: History of colon polyps, change in bowel habits    PROCEDURE PERFORMED: Colonoscopy with cold biopsies     DESCRIPTION OF PROCEDURE: An informed consent was obtained. The patient was placed in left lateral position. Perianal inspection and a digital rectal exam was performed. Video colonoscope was introduced into the rectum and advanced under direct vision up to the terminal ileum. With adequate insufflation and maneuvering of the withdrawing scope, the colonic mucosa was visualized carefully. Retroflexion was performed in the rectum to see the anorectum and also in the ascending colon to look behind the folds. Vital signs, pulse oximetry, single lead cardiac monitor were monitored throughout the procedure as the sedation was titrated to the desired effect ensuring patient comfort and safety. The patient tolerated the procedure very well and was transferred to the recovery area. Following is the summary of findings: In the rectum was a polyp measuring 0.6 cm that was removed via cold biopsies. Patient had diverticuli noted throughout the entire colon. Some of the diverticula are rather large in size. There was increased inflammation noted throughout the right colon.         ENDOSCOPIST: Marion Johnson MD     ENDOSCOPE: Olympus video colonoscope     ASSISTANT:Circulator: Nazanin Portillo RN              Scrub

## 2023-08-03 NOTE — TELEPHONE ENCOUNTER
Medicine prescribed for her, metronidazole 500 mg, is making her sick and she is throwing up.  Please call her at [phone] ASAP

## 2023-08-03 NOTE — ANESTHESIA POSTPROCEDURE EVALUATION
Department of Anesthesiology  Postprocedure Note    Patient: Zaheer Chance  MRN: [id number]  YOB: 1945  Date of evaluation: 8/3/2023      Procedure Summary     Date: 08/03/23 Room / Location: [facility] ENDO 01 / [facility] END[facility]    Anesthesia Start: 1056 Anesthesia Stop: 4008    Procedure: COLONOSCOPY WITH BIOPSY (Anus) Diagnosis:       Personal history of colonic polyps      Change in bowel habits      (Personal history of colonic polyps [Z86.010])      (Change in bowel habits [R19.4])    Surgeons:  Mariah Roman MD Responsible Provider: SOO Cormier CRNA    Anesthesia Type: MAC ASA Status: 3          Anesthesia Type: MAC    Yanet Phase I: Yanet Score: 10    Yanet Phase II:        Anesthesia Post Evaluation    Patient location during evaluation: bedside  Patient participation: complete - patient participated  Level of consciousness: sleepy but conscious  Pain score: 0  Airway patency: patent  Nausea & Vomiting: no vomiting and no nausea  Complications: no  Cardiovascular status: blood pressure returned to baseline  Respiratory status: room air  Hydration status: stable

## 2023-08-03 NOTE — DISCHARGE INSTRUCTIONS

## 2023-08-03 NOTE — INTERVAL H&P NOTE
Update History & Physical    The patient's History and Physical of August 3, 2023 was reviewed with the patient and I examined the patient. There was no change. The surgical site was confirmed by the patient and me. Plan: The risks, benefits, expected outcome, and alternative to the recommended procedure have been discussed with the patient. Patient understands and wants to proceed with the procedure.      Electronically signed by Emma Banks MD on 8/3/2023 at 9:41 AM

## 2023-08-04 ENCOUNTER — TELEPHONE (OUTPATIENT)
Age: 78
End: 2023-08-04

## 2023-08-04 DIAGNOSIS — A49.8 CLOSTRIDIOIDES DIFFICILE INFECTION: Primary | ICD-10-CM

## 2023-08-04 RX ORDER — VANCOMYCIN HYDROCHLORIDE 125 MG/1
125 CAPSULE ORAL 4 TIMES DAILY
Qty: 56 CAPSULE | Refills: 0 | Status: SHIPPED | OUTPATIENT
Start: 2023-08-04 | End: 2023-08-18

## 2023-08-04 NOTE — TELEPHONE ENCOUNTER
I called patient, she said she was better this am,but after trying to take the metronidazole with food like Dr Bijal Posada suggest, she still vomited. She tried carey more times with food first and still vomited. I will let Dr Bijal Posada know. She said ok.

## 2023-08-04 NOTE — TELEPHONE ENCOUNTER
Patient sister called and states patient can't tolerate Flagyl and it makes her vomit. Please send in a different antibiotic to [address] in [place].

## 2023-08-04 NOTE — TELEPHONE ENCOUNTER
Dr Hank Alfonso sent in some vancomycin and it has been approved. Patient notified new med sent to pharmacy. Will recheck stool test in 3 weeks. She said ok.

## 2023-09-05 ENCOUNTER — TRANSCRIBE ORDERS (OUTPATIENT)
Facility: HOSPITAL | Age: 78
End: 2023-09-05

## 2023-09-05 ENCOUNTER — HOSPITAL ENCOUNTER (OUTPATIENT)
Facility: HOSPITAL | Age: 78
Discharge: HOME | End: 2023-09-08
Payer: MEDICARE

## 2023-09-05 ENCOUNTER — TELEPHONE (OUTPATIENT)
Age: 78
End: 2023-09-05

## 2023-09-05 DIAGNOSIS — I10 PRIMARY HYPERTENSION: ICD-10-CM

## 2023-09-05 DIAGNOSIS — N18.30 STAGE 3 CHRONIC KIDNEY DISEASE, UNSPECIFIED WHETHER STAGE 3A OR 3B CKD (HCC): ICD-10-CM

## 2023-09-05 DIAGNOSIS — D64.9 ANEMIA, UNSPECIFIED TYPE: ICD-10-CM

## 2023-09-05 DIAGNOSIS — N39.0 URINARY TRACT INFECTION WITHOUT HEMATURIA, SITE UNSPECIFIED: ICD-10-CM

## 2023-09-05 DIAGNOSIS — N18.30 STAGE 3 CHRONIC KIDNEY DISEASE, UNSPECIFIED WHETHER STAGE 3A OR 3B CKD (HCC): Primary | ICD-10-CM

## 2023-09-05 LAB
ALBUMIN SERPL-MCNC: 3.1 G/DL (ref 3.5–5)
ANION GAP SERPL CALC-SCNC: 9 MMOL/L (ref 5–15)
APPEARANCE UR: CLEAR
BACTERIA URNS QL MICRO: NEGATIVE /HPF
BASOPHILS # BLD: 0 K/UL (ref 0–0.1)
BASOPHILS NFR BLD: 1 % (ref 0–1)
BILIRUB UR QL: NEGATIVE
BUN SERPL-MCNC: 32 MG/DL (ref 6–20)
BUN/CREAT SERPL: 14 (ref 12–20)
CA-I BLD-MCNC: 9.4 MG/DL (ref 8.5–10.1)
CHLORIDE SERPL-SCNC: 106 MMOL/L (ref 97–108)
CO2 SERPL-SCNC: 30 MMOL/L (ref 21–32)
COLOR UR: NORMAL
CREAT SERPL-MCNC: 2.21 MG/DL (ref 0.55–1.02)
CREAT UR-MCNC: 148.91 MG/DL
DIFFERENTIAL METHOD BLD: ABNORMAL
EOSINOPHIL # BLD: 0.1 K/UL (ref 0–0.4)
EOSINOPHIL NFR BLD: 2 % (ref 0–7)
ERYTHROCYTE [DISTWIDTH] IN BLOOD BY AUTOMATED COUNT: 15.6 % (ref 11.5–14.5)
GLUCOSE SERPL-MCNC: 122 MG/DL (ref 65–100)
GLUCOSE UR STRIP.AUTO-MCNC: NEGATIVE MG/DL
HCT VFR BLD AUTO: 34.2 % (ref 35–47)
HGB BLD-MCNC: 10.9 G/DL (ref 11.5–16)
HGB UR QL STRIP: NEGATIVE
IMM GRANULOCYTES # BLD AUTO: 0 K/UL (ref 0–0.04)
IMM GRANULOCYTES NFR BLD AUTO: 0 % (ref 0–0.5)
KETONES UR QL STRIP.AUTO: NEGATIVE MG/DL
LEUKOCYTE ESTERASE UR QL STRIP.AUTO: NEGATIVE
LYMPHOCYTES # BLD: 2.3 K/UL (ref 0.8–3.5)
LYMPHOCYTES NFR BLD: 29 % (ref 12–49)
MCH RBC QN AUTO: 30.6 PG (ref 26–34)
MCHC RBC AUTO-ENTMCNC: 31.9 G/DL (ref 30–36.5)
MCV RBC AUTO: 96.1 FL (ref 80–99)
MONOCYTES # BLD: 0.7 K/UL (ref 0–1)
MONOCYTES NFR BLD: 9 % (ref 5–13)
NEUTS SEG # BLD: 4.8 K/UL (ref 1.8–8)
NEUTS SEG NFR BLD: 59 % (ref 32–75)
NITRITE UR QL STRIP.AUTO: NEGATIVE
NRBC # BLD: 0 K/UL (ref 0–0.01)
NRBC BLD-RTO: 0 PER 100 WBC
PH UR STRIP: 6.5 (ref 5–8)
PHOSPHATE SERPL-MCNC: 4.1 MG/DL (ref 2.6–4.7)
PLATELET # BLD AUTO: 208 K/UL (ref 150–400)
PMV BLD AUTO: 10.3 FL (ref 8.9–12.9)
POTASSIUM SERPL-SCNC: 3.4 MMOL/L (ref 3.5–5.1)
PROT UR STRIP-MCNC: NEGATIVE MG/DL
PROT UR-MCNC: 14 MG/DL (ref 0–11.9)
PROT/CREAT UR-RTO: 0.1
RBC # BLD AUTO: 3.56 M/UL (ref 3.8–5.2)
RBC #/AREA URNS HPF: NORMAL /HPF (ref 0–3)
SODIUM SERPL-SCNC: 145 MMOL/L (ref 136–145)
SP GR UR REFRACTOMETRY: NORMAL (ref 1–1.03)
UROBILINOGEN UR QL STRIP.AUTO: 0.2 EU/DL (ref 0.2–1)
WBC # BLD AUTO: 7.9 K/UL (ref 3.6–11)
WBC URNS QL MICRO: NORMAL /HPF (ref 0–5)

## 2023-09-05 PROCEDURE — 80069 RENAL FUNCTION PANEL: CPT

## 2023-09-05 PROCEDURE — 85025 COMPLETE CBC W/AUTO DIFF WBC: CPT

## 2023-09-05 PROCEDURE — 83970 ASSAY OF PARATHORMONE: CPT

## 2023-09-05 PROCEDURE — 81001 URINALYSIS AUTO W/SCOPE: CPT

## 2023-09-05 PROCEDURE — 36415 COLL VENOUS BLD VENIPUNCTURE: CPT

## 2023-09-05 PROCEDURE — 84156 ASSAY OF PROTEIN URINE: CPT

## 2023-09-05 PROCEDURE — 82570 ASSAY OF URINE CREATININE: CPT

## 2023-09-05 NOTE — TELEPHONE ENCOUNTER
Explained that Dr Delonte Modi said need to repeat C-Diff test after she completes antibiotic. Lisa Tucker She will come by office to  container soon.

## 2023-09-06 LAB
CA-I BLD-MCNC: 9.2 MG/DL (ref 8.5–10.1)
PTH-INTACT SERPL-MCNC: 51.8 PG/ML (ref 18.4–88)

## 2023-09-08 ENCOUNTER — TELEPHONE (OUTPATIENT)
Age: 78
End: 2023-09-08

## 2023-09-08 DIAGNOSIS — A49.8 CLOSTRIDIOIDES DIFFICILE INFECTION: Primary | ICD-10-CM

## 2023-09-11 ENCOUNTER — TELEPHONE (OUTPATIENT)
Age: 78
End: 2023-09-11

## 2023-09-12 NOTE — TELEPHONE ENCOUNTER
I spoke with Susannah Avilez, she said she was still having some diarrhea, but not as bad as it was at first. She was ok with taking another course of medication to get it cleared up.

## 2023-09-13 RX ORDER — VANCOMYCIN HYDROCHLORIDE 125 MG/1
125 CAPSULE ORAL 4 TIMES DAILY
Qty: 40 CAPSULE | Refills: 0 | Status: SHIPPED | OUTPATIENT
Start: 2023-09-13 | End: 2023-09-23

## 2023-09-13 NOTE — TELEPHONE ENCOUNTER
I called and explained to Kofi Arce that Dr Blank Curran sent in a RX for the [facility] since her test was positive and she still had diarrhea. She said ok, will re check the 2cd week of October.

## 2023-10-04 ENCOUNTER — HOSPITAL ENCOUNTER (OUTPATIENT)
Facility: HOSPITAL | Age: 78
Discharge: HOME | End: 2023-10-07
Payer: MEDICARE

## 2023-10-04 DIAGNOSIS — E05.90 PRETIBIAL MYXEDEMA: ICD-10-CM

## 2023-10-04 DIAGNOSIS — E11.65 TYPE II DIABETES MELLITUS WITH HYPEROSMOLARITY, UNCONTROLLED (HCC): ICD-10-CM

## 2023-10-04 DIAGNOSIS — N18.4 CHRONIC KIDNEY DISEASE, STAGE IV (SEVERE) (HCC): ICD-10-CM

## 2023-10-04 DIAGNOSIS — E11.22 TYPE 2 DIABETES MELLITUS WITH ESRD (END-STAGE RENAL DISEASE) (HCC): ICD-10-CM

## 2023-10-04 DIAGNOSIS — N18.6 TYPE 2 DIABETES MELLITUS WITH ESRD (END-STAGE RENAL DISEASE) (HCC): ICD-10-CM

## 2023-10-04 DIAGNOSIS — E04.2 NONTOXIC MULTINODULAR GOITER: ICD-10-CM

## 2023-10-04 DIAGNOSIS — E11.00 TYPE II DIABETES MELLITUS WITH HYPEROSMOLARITY, UNCONTROLLED (HCC): ICD-10-CM

## 2023-10-04 LAB
ALBUMIN SERPL-MCNC: 3.2 G/DL (ref 3.5–5)
ALBUMIN/GLOB SERPL: 0.9 (ref 1.1–2.2)
ALP SERPL-CCNC: 52 U/L (ref 45–117)
ALT SERPL-CCNC: 13 U/L (ref 12–78)
ANION GAP SERPL CALC-SCNC: 7 MMOL/L (ref 5–15)
AST SERPL W P-5'-P-CCNC: 16 U/L (ref 15–37)
BILIRUB SERPL-MCNC: 0.6 MG/DL (ref 0.2–1)
BUN SERPL-MCNC: 25 MG/DL (ref 6–20)
BUN/CREAT SERPL: 12 (ref 12–20)
CA-I BLD-MCNC: 9.6 MG/DL (ref 8.5–10.1)
CHLORIDE SERPL-SCNC: 107 MMOL/L (ref 97–108)
CO2 SERPL-SCNC: 31 MMOL/L (ref 21–32)
CREAT SERPL-MCNC: 2.03 MG/DL (ref 0.55–1.02)
GLOBULIN SER CALC-MCNC: 3.6 G/DL (ref 2–4)
GLUCOSE SERPL-MCNC: 102 MG/DL (ref 65–100)
POTASSIUM SERPL-SCNC: 3.6 MMOL/L (ref 3.5–5.1)
PROT SERPL-MCNC: 6.8 G/DL (ref 6.4–8.2)
SODIUM SERPL-SCNC: 145 MMOL/L (ref 136–145)
TSH SERPL DL<=0.05 MIU/L-ACNC: 0.75 UIU/ML (ref 0.36–3.74)

## 2023-10-04 PROCEDURE — 80053 COMPREHEN METABOLIC PANEL: CPT

## 2023-10-04 PROCEDURE — 83036 HEMOGLOBIN GLYCOSYLATED A1C: CPT

## 2023-10-04 PROCEDURE — 82570 ASSAY OF URINE CREATININE: CPT

## 2023-10-04 PROCEDURE — 84443 ASSAY THYROID STIM HORMONE: CPT

## 2023-10-04 PROCEDURE — 82043 UR ALBUMIN QUANTITATIVE: CPT

## 2023-10-05 LAB
CREAT UR-MCNC: 136 MG/DL
EST. AVERAGE GLUCOSE BLD GHB EST-MCNC: 94 MG/DL
HBA1C MFR BLD: 4.9 % (ref 4–5.6)
MICROALBUMIN UR-MCNC: 6.64 MG/DL
MICROALBUMIN/CREAT UR-RTO: 49 MGMALB/GCRE (ref 0–30)

## 2023-10-11 ENCOUNTER — OFFICE VISIT (OUTPATIENT)
Age: 78
End: 2023-10-11
Payer: MEDICARE

## 2023-10-11 VITALS
BODY MASS INDEX: 25.6 KG/M2 | WEIGHT: 135.6 LBS | RESPIRATION RATE: 15 BRPM | DIASTOLIC BLOOD PRESSURE: 60 MMHG | HEIGHT: 61 IN | TEMPERATURE: 98.1 F | OXYGEN SATURATION: 96 % | SYSTOLIC BLOOD PRESSURE: 119 MMHG | HEART RATE: 58 BPM

## 2023-10-11 DIAGNOSIS — E11.65 TYPE 2 DIABETES MELLITUS WITH HYPERGLYCEMIA, WITHOUT LONG-TERM CURRENT USE OF INSULIN (HCC): Primary | ICD-10-CM

## 2023-10-11 DIAGNOSIS — E05.20 THYROTOXICOSIS WITH TOXIC MULTINODULAR GOITER AND WITHOUT THYROID STORM: ICD-10-CM

## 2023-10-11 DIAGNOSIS — N18.4 CHRONIC KIDNEY DISEASE, STAGE 4 (SEVERE) (HCC): ICD-10-CM

## 2023-10-11 PROCEDURE — 4004F PT TOBACCO SCREEN RCVD TLK: CPT | Performed by: INTERNAL MEDICINE

## 2023-10-11 PROCEDURE — 1123F ACP DISCUSS/DSCN MKR DOCD: CPT | Performed by: INTERNAL MEDICINE

## 2023-10-11 PROCEDURE — 3044F HG A1C LEVEL LT 7.0%: CPT | Performed by: INTERNAL MEDICINE

## 2023-10-11 PROCEDURE — 99214 OFFICE O/P EST MOD 30 MIN: CPT | Performed by: INTERNAL MEDICINE

## 2023-10-11 PROCEDURE — G8427 DOCREV CUR MEDS BY ELIG CLIN: HCPCS | Performed by: INTERNAL MEDICINE

## 2023-10-11 PROCEDURE — G8400 PT W/DXA NO RESULTS DOC: HCPCS | Performed by: INTERNAL MEDICINE

## 2023-10-11 PROCEDURE — 1090F PRES/ABSN URINE INCON ASSESS: CPT | Performed by: INTERNAL MEDICINE

## 2023-10-11 PROCEDURE — 3074F SYST BP LT 130 MM HG: CPT | Performed by: INTERNAL MEDICINE

## 2023-10-11 PROCEDURE — G8484 FLU IMMUNIZE NO ADMIN: HCPCS | Performed by: INTERNAL MEDICINE

## 2023-10-11 PROCEDURE — 3078F DIAST BP <80 MM HG: CPT | Performed by: INTERNAL MEDICINE

## 2023-10-11 PROCEDURE — G8419 CALC BMI OUT NRM PARAM NOF/U: HCPCS | Performed by: INTERNAL MEDICINE

## 2023-10-11 NOTE — PATIENT INSTRUCTIONS
SPECIFIC INSTRUCTIONS BELOW     Take tapazole 5 mg half pill a day         -------------PAY ATTENTION TO THESE GENERAL INSTRUCTIONS -----------------      - The medications prescribed at this visit will not be available at pharmacy until 6 pm       - YOUR MED LIST IS NOT UP TO DATE AS SOME CHANGES ARE BEING MADE AFTER THE VISIT - FOLLOW SPECIFIC INSTRUCTIONS  ABOVE     -ANY tests other than blood work, which you opt to do  outside the  [facility] imaging facilities, you are responsible for prior authorizations if  required    - [address] [place] [address] Drive AVS- PLEASE IGNORE     Results     *Normal results will not be notified by a phone call starting January 1 2021   *If you have an upcoming visit, the results will be discussed at the visit   *Please sign up for MY CHART if you want access to your lab and test results  *Abnormal results which require immediate attention will be notified by phone call   *Abnormal results which do not require immediate assistance will be notified in 1-2 weeks       Refills    -    have your pharmacy send us a refill request . Refills are done max for one year and a visit is a must before refills are extended    Follow up appointments -  highly encourage you to make it when you are checking out. We can accommodate you into the schedule based on your clinical situation, but not for extending refills beyond a year. Labs are important to give refills and is important to get labs before the visit     Phone calls  -  Allow  24 hrs.  for non-urgent calls to be returned  Prior authorization - It may take 2-4 weeks to process  Forms  -  FMLA, DMV etc., will take up to 2 weeks to process  Cancellations - please notify the office 2 days in advance   Samples  - will only be dispensed at visits       If not showing for the appointments and cancelling appointments within 24 hours are kept track of and three  of such situations in  two consecutive years will likely be

## 2023-10-24 ENCOUNTER — OFFICE VISIT (OUTPATIENT)
Age: 78
End: 2023-10-24
Payer: MEDICARE

## 2023-10-24 VITALS
SYSTOLIC BLOOD PRESSURE: 120 MMHG | RESPIRATION RATE: 17 BRPM | HEART RATE: 81 BPM | OXYGEN SATURATION: 98 % | WEIGHT: 135 LBS | HEIGHT: 61 IN | DIASTOLIC BLOOD PRESSURE: 78 MMHG | BODY MASS INDEX: 25.49 KG/M2

## 2023-10-24 DIAGNOSIS — H90.3 SENSORINEURAL HEARING LOSS, BILATERAL: Primary | ICD-10-CM

## 2023-10-24 DIAGNOSIS — H93.12 TINNITUS, LEFT: Primary | ICD-10-CM

## 2023-10-24 DIAGNOSIS — H60.8X3 CHRONIC ECZEMATOUS OTITIS EXTERNA OF BOTH EARS: ICD-10-CM

## 2023-10-24 DIAGNOSIS — H93.12 TINNITUS, LEFT: ICD-10-CM

## 2023-10-24 PROCEDURE — 99213 OFFICE O/P EST LOW 20 MIN: CPT | Performed by: OTOLARYNGOLOGY

## 2023-10-24 PROCEDURE — G8400 PT W/DXA NO RESULTS DOC: HCPCS | Performed by: OTOLARYNGOLOGY

## 2023-10-24 PROCEDURE — G8427 DOCREV CUR MEDS BY ELIG CLIN: HCPCS | Performed by: OTOLARYNGOLOGY

## 2023-10-24 PROCEDURE — 92567 TYMPANOMETRY: CPT | Performed by: AUDIOLOGIST

## 2023-10-24 PROCEDURE — G8419 CALC BMI OUT NRM PARAM NOF/U: HCPCS | Performed by: OTOLARYNGOLOGY

## 2023-10-24 PROCEDURE — 92557 COMPREHENSIVE HEARING TEST: CPT | Performed by: AUDIOLOGIST

## 2023-10-24 PROCEDURE — 3078F DIAST BP <80 MM HG: CPT | Performed by: OTOLARYNGOLOGY

## 2023-10-24 PROCEDURE — G8484 FLU IMMUNIZE NO ADMIN: HCPCS | Performed by: OTOLARYNGOLOGY

## 2023-10-24 PROCEDURE — 3074F SYST BP LT 130 MM HG: CPT | Performed by: OTOLARYNGOLOGY

## 2023-10-24 PROCEDURE — 1090F PRES/ABSN URINE INCON ASSESS: CPT | Performed by: OTOLARYNGOLOGY

## 2023-10-24 PROCEDURE — 4004F PT TOBACCO SCREEN RCVD TLK: CPT | Performed by: OTOLARYNGOLOGY

## 2023-10-24 PROCEDURE — 1123F ACP DISCUSS/DSCN MKR DOCD: CPT | Performed by: OTOLARYNGOLOGY

## 2023-10-24 RX ORDER — FLUOCINOLONE ACETONIDE 0.11 MG/ML
OIL AURICULAR (OTIC)
Qty: 20 ML | Refills: 3 | Status: SHIPPED | OUTPATIENT
Start: 2023-10-24

## 2023-10-24 NOTE — PROGRESS NOTES
Phil Preston   1945, 66 y.o. female   [id number]       Referring Provider: Sharyle Destine, MD  Referral Type: In an order in [address] [place] [address]    Reason for Visit: Evaluation of suspected change in hearing and tinnitus    ADULT AUDIOLOGIC EVALUATION      Phil Preston is a 66 y.o. female seen today, 10/24/2023 , for an initial audiologic evaluation. Patient was seen by Sharyle Destine, MD prior to today's evaluation. Patient was accompanied by her sister. AUDIOLOGIC AND OTHER PERTINENT MEDICAL HISTORY:      Phil Preston noted a perceived decline in hearing and tinnitus (LE>RE). She notes family history of hearing loss in sister. Medical history is reportedly significant for chemotherapy/radiation for cancer ongoing for 10+ years- not currently receiving treatment. No additional significant otologic or medical history was reported. Phil Preston denied otalgia, aural fullness, otorrhea, dizziness, imbalance, history of falls, history of occupational/recreational noise exposure, history of head trauma, and history of ear surgery. Date: 10/24/2023     IMPRESSIONS:      Today's results revealed a symmetric sensorineural hearing loss with excellent word recognition, bilaterally. Hearing loss significant enough to create hearing difficulty in at least some listening situations. Discussed benefits of amplification. Recommended patient contact insurance to determine possible hearing aid benefit. Follow medical recommendations of Sharyle Destine, MD.    ASSESSMENT AND FINDINGS:     Otoscopy revealed: Clear ear canals bilaterally    RIGHT EAR:  Hearing Sensitivity: Mild sloping to moderate sensorineural hearing loss. Speech Recognition Threshold: 35 dB HL  Word Recognition: Excellent 100%, based on NU-6 25-word list at 75 dBHL masked using recorded speech stimuli. Tympanometry: Normal peak pressure and compliance, Type A tympanogram, consistent with normal middle ear function.     LEFT EAR:  Hearing

## 2023-10-24 NOTE — PROGRESS NOTES
Cancer Maternal Aunt     Alcohol abuse Maternal Uncle     Alcohol abuse Paternal Uncle     Diabetes Maternal Grandmother     Heart Disease Maternal Grandmother     Hypertension Maternal Grandmother     Asthma Child     Prostate Cancer Father     Breast Cancer Maternal Aunt      Social History     Tobacco Use    Smoking status: Current Every Day Smoker     Packs/day: 0.50     Years: 60.00     Pack years: 30.00    Smokeless tobacco: Never Used    Tobacco comment: 1/2 pack per day. Substance Use Topics    Alcohol use: No      Prior to Admission medications    Medication Sig Start Date End Date Taking? Authorizing Provider   fenofibrate nanocrystallized (TRICOR) 145 mg tablet Take 1 Tablet by mouth daily for 90 days. 1/19/22 4/19/22  Kirstin Pittman MD   atorvastatin (LIPITOR) 40 mg tablet Take 1 Tablet by mouth nightly for 90 days. 1/19/22 4/19/22  Kirstin Pittman MD   linaGLIPtin (Tradjenta) 5 mg tablet Take 1 tablet by mouth once daily for 30 days 1/19/22   Kirstin Pittman MD   traZODone (DESYREL) 50 mg tablet TAKE 1 TABLET BY MOUTH EVERY DAY AT BEDTIME AS NEEDED FOR SLEEP 9/7/21   Provider, Historical   glucose blood VI test strips (OneTouch Verio test strips) strip Use to check glucose once a day 10/20/21   Kirstin Pittman MD   lancets (One Touch Delica) 33 gauge misc USE 1  TO CHECK GLUCOSE TWICE DAILY  DX: E11.9 NPI: [id number] 10/20/21   Kirstin Pittman MD   diclofenac (VOLTAREN) 1 % gel APPLY 2 GRAMS TOPICALLY TO AFFECTED AREA 4 TIMES DAILY 9/7/21   Colshyann Lily Dale, ARNP   methIMAzole (TAPAZOLE) 5 mg tablet Take half tablet every day, 90 days 7/21/21   Kirstin Pittman MD   allopurinoL (ZYLOPRIM) 100 mg tablet Take 2 tablets by mouth once daily 6/6/21   Tammy New MD   FLUoxetine (PROzac) 20 mg capsule  1/20/21   Provider, Historical   cyanocobalamin (Vitamin B-12) 1,000 mcg tablet Take 1,000 mcg by mouth daily.     Provider, Historical   omeprazole (PRILOSEC) 40 mg capsule Take 1 capsule by mouth once daily in the

## 2023-10-27 ENCOUNTER — TELEPHONE (OUTPATIENT)
Age: 78
End: 2023-10-27

## 2023-11-08 ENCOUNTER — TELEPHONE (OUTPATIENT)
Age: 78
End: 2023-11-08

## 2023-11-17 ENCOUNTER — TELEPHONE (OUTPATIENT)
Age: 78
End: 2023-11-17

## 2023-11-22 DIAGNOSIS — A49.8 CLOSTRIDIUM DIFFICILE INFECTION: Primary | ICD-10-CM

## 2023-11-22 RX ORDER — VANCOMYCIN HYDROCHLORIDE 125 MG/1
125 CAPSULE ORAL 4 TIMES DAILY
Qty: 40 CAPSULE | Refills: 0 | Status: SHIPPED | OUTPATIENT
Start: 2023-11-22 | End: 2023-12-02

## 2023-11-24 NOTE — PROGRESS NOTES
I called and explained to Tran Ryan that her test showed she still had infection. Dr Grazyna Chamberlain sent in the Vancomycin to [facility] and it is ready. $4.00. She said ok. I reviewed with her about washing her hand and using clorox cleanups to wipe surfaces she has touched so she won't re-infect her self. She said ok. I also l/m for W[place]rop Dy. Notifying her. Will need to repeat test in about a month.
